# Patient Record
Sex: FEMALE | Race: WHITE | NOT HISPANIC OR LATINO | Employment: OTHER | URBAN - METROPOLITAN AREA
[De-identification: names, ages, dates, MRNs, and addresses within clinical notes are randomized per-mention and may not be internally consistent; named-entity substitution may affect disease eponyms.]

---

## 2017-03-20 ENCOUNTER — PATIENT MESSAGE (OUTPATIENT)
Dept: MEDICAL GROUP | Age: 57
End: 2017-03-20

## 2017-03-23 ENCOUNTER — OFFICE VISIT (OUTPATIENT)
Dept: MEDICAL GROUP | Age: 57
End: 2017-03-23
Payer: COMMERCIAL

## 2017-03-23 VITALS
DIASTOLIC BLOOD PRESSURE: 74 MMHG | SYSTOLIC BLOOD PRESSURE: 120 MMHG | HEART RATE: 78 BPM | OXYGEN SATURATION: 98 % | TEMPERATURE: 98.2 F | WEIGHT: 139 LBS | BODY MASS INDEX: 23.73 KG/M2 | HEIGHT: 64 IN

## 2017-03-23 DIAGNOSIS — H65.02 ACUTE SEROUS OTITIS MEDIA OF LEFT EAR, RECURRENCE NOT SPECIFIED: ICD-10-CM

## 2017-03-23 DIAGNOSIS — M62.838 MUSCLE SPASMS OF NECK: ICD-10-CM

## 2017-03-23 PROCEDURE — 99214 OFFICE O/P EST MOD 30 MIN: CPT | Performed by: INTERNAL MEDICINE

## 2017-03-23 RX ORDER — AMOXICILLIN AND CLAVULANATE POTASSIUM 875; 125 MG/1; MG/1
1 TABLET, FILM COATED ORAL 2 TIMES DAILY
Qty: 20 TAB | Refills: 0 | Status: SHIPPED | OUTPATIENT
Start: 2017-03-23 | End: 2017-04-02

## 2017-03-23 RX ORDER — OMEPRAZOLE 40 MG/1
40 CAPSULE, DELAYED RELEASE ORAL EVERY EVENING
Refills: 5 | COMMUNITY
Start: 2017-03-10 | End: 2020-01-30

## 2017-03-23 RX ORDER — IBUPROFEN 200 MG
200 TABLET ORAL EVERY 6 HOURS PRN
COMMUNITY
End: 2018-01-25

## 2017-03-23 RX ORDER — CYCLOBENZAPRINE HCL 10 MG
10 TABLET ORAL 2 TIMES DAILY PRN
Qty: 60 TAB | Refills: 3 | Status: SHIPPED | OUTPATIENT
Start: 2017-03-23 | End: 2017-09-19

## 2017-03-23 RX ORDER — METHYLPREDNISOLONE 4 MG/1
TABLET ORAL
Qty: 21 TAB | Refills: 0 | Status: SHIPPED | OUTPATIENT
Start: 2017-03-23 | End: 2017-09-19

## 2017-03-23 ASSESSMENT — PATIENT HEALTH QUESTIONNAIRE - PHQ9: CLINICAL INTERPRETATION OF PHQ2 SCORE: 0

## 2017-03-23 NOTE — MR AVS SNAPSHOT
"        Danii Rodriguez   3/23/2017 4:40 PM   Office Visit   MRN: [id number]    Department:  [address] [place] [address]   Dept Phone:  [phone]    Description:  Female : 1960   Provider:  Juany Ralph M.D.           Reason for Visit     Ear Pain with severe headaches and (L) cheek numbness mostly at night while laying down x 2-3 months      Allergies as of 3/23/2017     Allergen Noted Reactions    Ciprofloxacin 2016   Hives    Okay to take cipro ear drops, no reaction with ear drop    Nsaids 2016       Pt has stomach issues when she takes anti-inflammatories.    Other Environmental 2014       seasonal    Vicodin [Hydrocodone-Acetaminophen] 2016   Rash    Rash       You were diagnosed with     Acute serous otitis media of left ear, recurrence not specified   [[id number]]       Muscle spasms of neck   [190897]         Vital Signs     Blood Pressure Pulse Temperature Height Weight Body Mass Index    120/74 mmHg 78 36.8 °C (98.2 °F) 1.626 m (5' 4.02\") 63.05 kg (139 lb) 23.85 kg/m2    Oxygen Saturation Smoking Status                98% Former Smoker          Basic Information     Date Of Birth Sex Race Ethnicity Preferred Language    1960 Female White Non- English      Problem List              ICD-10-CM Priority Class Noted - Resolved    Seasonal allergies J30.2   2014 - Present    Gastroesophageal reflux disease without esophagitis K21.9   2014 - Present    History of carpal tunnel repair Z98.890   2014 - Present    Pressure sensation in left ear, left temporal area and left TM joint H93.8X2   2016 - Present    History of hypothyroidism Z86.39   2016 - Present    Hyperlipidemia E78.5   2016 - Present    Left elbow pain M25.522   2016 - Present    Ulcer of esophagus without bleeding K22.10   2016 - Present    Temporal pain R51   10/17/2016 - Present    Acute serous otitis media of left ear H65.02   3/23/2017 - Present    Muscle spasms " of neck M62.838   3/23/2017 - Present      Health Maintenance        Date Due Completion Dates    IMM INFLUENZA (1) 9/1/2016 10/27/2015    MAMMOGRAM 9/20/2017 9/20/2016, 10/7/2014, 7/22/2013, 10/30/2006, 1/25/2006, 1/24/2005, 1/23/2004    PAP SMEAR 10/22/2017 10/22/2014    COLONOSCOPY 10/16/2019 10/16/2014    IMM DTaP/Tdap/Td Vaccine (2 - Td) 9/8/2020 9/8/2010            Current Immunizations     Influenza Vaccine Quad Inj (Pf) 10/27/2015  8:09 AM    Tdap Vaccine 9/8/2010      Below and/or attached are the medications your provider expects you to take. Review all of your home medications and newly ordered medications with your provider and/or pharmacist. Follow medication instructions as directed by your provider and/or pharmacist. Please keep your medication list with you and share with your provider. Update the information when medications are discontinued, doses are changed, or new medications (including over-the-counter products) are added; and carry medication information at all times in the event of emergency situations     Allergies:  CIPROFLOXACIN - Hives     NSAIDS - (reactions not documented)     OTHER ENVIRONMENTAL - (reactions not documented)     VICODIN - Rash               Medications  Valid as of: March 23, 2017 -  5:46 PM    Generic Name Brand Name Tablet Size Instructions for use    Amoxicillin-Pot Clavulanate (Tab) AUGMENTIN 875-125 MG Take 1 Tab by mouth 2 times a day for 10 days.        Cholecalciferol (Cap) Vitamin D3 2000 UNIT Take  by mouth.        Cyclobenzaprine HCl (Tab) FLEXERIL 10 MG Take 1 Tab by mouth 2 times a day as needed.        Fluticasone Propionate (Suspension) FLONASE 50 MCG/ACT SPRAY 2 SPRAYS IN NOSE EVERY DAY.        Ibuprofen (Tab) MOTRIN 200 MG Take 200 mg by mouth every 6 hours as needed.        Krill Oil   Take  by mouth.        Loratadine (Tab) CLARITIN 10 MG Take 10 mg by mouth every day.        MethylPREDNISolone (Tablet Therapy Pack) MEDROL DOSEPAK 4 MG Take as  instructed and take with food.        Multiple Vitamins-Minerals   Take  by mouth.        Omeprazole (CAPSULE DELAYED RELEASE) PRILOSEC 40 MG Take 40 mg by mouth.        Probiotic Product   Take  by mouth.        .                 Medicines prescribed today were sent to:     [id number] - [place], [place] - [address]    [address] [facility] [address] [address]    Phone: [phone] Fax: [phone]    Open 24 Hours?: No    [id number] - [place], [place] - [address]    [address] [facility] [address]    Phone: [phone] Fax: [phone]    Open 24 Hours?: No      Medication refill instructions:       If your prescription bottle indicates you have medication refills left, it is not necessary to call your provider’s office. Please contact your pharmacy and they will refill your medication.    If your prescription bottle indicates you do not have any refills left, you may request refills at any time through one of the following ways: The online Makeblock system (except Urgent Care), by calling your provider’s office, or by asking your pharmacy to contact your provider’s office with a refill request. Medication refills are processed only during regular business hours and may not be available until the next business day. Your provider may request additional information or to have a follow-up visit with you prior to refilling your medication.   *Please Note: Medication refills are assigned a new Rx number when refilled electronically. Your pharmacy may indicate that no refills were authorized even though a new prescription for the same medication is available at the pharmacy. Please request the medicine by name with the pharmacy before contacting your provider for a refill.           Makeblock Access Code: Activation code not generated  Current Makeblock Status: Active

## 2017-03-24 NOTE — ASSESSMENT & PLAN NOTE
Patient reported that she has pressure, aching pain inside left ear radiated to left jaw and left side of head. Pain is constant, aching, pressure, pain increased at nighttime when she lied on the left side. She has upper airway infection 3 weeks ago and she still has runny nose and nasal congestion. She used sinus rinses and Flonase nasal spray. She also takes over-the-counter oral nasal decongestant. Her symptom does not improve with over-the-counter treatment. She has history of left-sided ear infection and has tube placed in the left side of the ear 6 years ago. She does not has recurrent ear infections since time. However, she has mild constant pressure pain on left side of the ear and temporal region as well as left TMJ. She does not think that she is clenching her teeth. However, she did not know that she clenches her jaw at night.  She had similar pain on the left side of the temporal area in October 2016 and ESR was negative at the time and rule out giant cell arteritis.

## 2017-03-24 NOTE — ASSESSMENT & PLAN NOTE
Patient reported that she has spasm on the back of the neck bilaterally for 2-3 months. She takes over-the-counter Advil with slightly improved. She feels her tightness radiated to the back of the neck as well. Symptoms is worsening at night.

## 2017-03-24 NOTE — PROGRESS NOTES
Subjective:   Danii Rodriguez is a 56 y.o. female here today for evaluation and management of:      Acute serous otitis media of left ear  Patient reported that she has pressure, aching pain inside left ear radiated to left jaw and left side of head. Pain is constant, aching, pressure, pain increased at nighttime when she lied on the left side. She has upper airway infection 3 weeks ago and she still has runny nose and nasal congestion. She used sinus rinses and Flonase nasal spray. She also takes over-the-counter oral nasal decongestant. Her symptom does not improve with over-the-counter treatment. She has history of left-sided ear infection and has tube placed in the left side of the ear 6 years ago. She does not has recurrent ear infections since time. However, she has mild constant pressure pain on left side of the ear and temporal region as well as left TMJ. She does not think that she is clenching her teeth. However, she did not know that she clenches her jaw at night.  She had similar pain on the left side of the temporal area in October 2016 and ESR was negative at the time and rule out giant cell arteritis.    Muscle spasms of neck  Patient reported that she has spasm on the back of the neck bilaterally for 2-3 months. She takes over-the-counter Advil with slightly improved. She feels her tightness radiated to the back of the neck as well. Symptoms is worsening at night.         Current medicines (including changes today)  Current Outpatient Prescriptions   Medication Sig Dispense Refill   • ibuprofen (ADVIL) 200 MG Tab Take 200 mg by mouth every 6 hours as needed.     • omeprazole (PRILOSEC) 40 MG delayed-release capsule Take 40 mg by mouth.  5   • cyclobenzaprine (FLEXERIL) 10 MG Tab Take 1 Tab by mouth 2 times a day as needed. 60 Tab 3   • amoxicillin-clavulanate (AUGMENTIN) 875-125 MG Tab Take 1 Tab by mouth 2 times a day for 10 days. 20 Tab 0   • MethylPREDNISolone (MEDROL DOSEPAK) 4 MG Tablet  "Therapy Pack Take as instructed and take with food. 21 Tab 0   • fluticasone (FLONASE) 50 MCG/ACT nasal spray SPRAY 2 SPRAYS IN NOSE EVERY DAY. 16 g 3   • Probiotic Product (PROBIOTIC COLON SUPPORT PO) Take  by mouth.     • Multiple Vitamins-Minerals (MULTIVITAL PO) Take  by mouth.     • Cholecalciferol (VITAMIN D3) 2000 UNIT Cap Take  by mouth.     • KRILL OIL OMEGA-3 PO Take  by mouth.     • loratadine (CLARITIN) 10 MG TABS Take 10 mg by mouth every day.       No current facility-administered medications for this visit.     She  has a past medical history of Unspecified urinary incontinence; MRSA (methicillin resistant staph aureus) culture positive (2010); Indigestion; Unspecified disorder of thyroid; and Anesthesia.    ROS   No chest pain, no shortness of breath, no abdominal pain       Objective:     Blood pressure 120/74, pulse 78, temperature 36.8 °C (98.2 °F), height 1.626 m (5' 4.02\"), weight 63.05 kg (139 lb), SpO2 98 %. Body mass index is 23.85 kg/(m^2).   Physical Exam:  General: Alert, oriented and no acute distress.  Eye contact is good, speech goal directed, affect calm  HEENT: conjunctiva non-injected, sclera non-icteric.  Oral mucous membranes pink and moist with no lesions.  Pinna normal. TM pearly gray. Erythema and swelling on left middle ear with serous effusion.    Neck No supraclavicular, submandibular, submental lymphadenopathy or masses in the neck or supraclavicular regions.  Lungs: Normal respiratory effort, clear to auscultation bilaterally with good excursion.  CV: regular rate and rhythm. No murmurs. No carotid bruits.  Abdomen: soft, non distended, nontender, Bowel sound normal.  Ext: no edema, color normal, vascularity normal, temperature normal  Musculoskeletal exam: mild tightness and tenderness on bilateral trapezius and bilateral neck. ROM of neck within normal.       Assessment and Plan:   The following treatment plan was discussed     1. Acute serous otitis media of left ear, " recurrence not specified  - Prescribed Augmentin and advise to take it with Probiotic. Reviewed the side effects of augmentin and Medrol rene with patient.  - Discussed at length to rinse sinuses with over the counter nasal wash or Netti pot once or twice a day and then use flonase nasal spray once or twice a day after rinsing flonase nasal spray.  - Advised to try nasal steam therapy. Showed picture and discussed with patient how to do nasal steam therapy.   - Given acute nasal congestion, pain and inflammation of left ear, I will prescribe Medrol dosepak. Advised to take it as instructed and take it with food.  - Advised to avoid nasal decongestant.    - Advised to try room humidifier.   - amoxicillin-clavulanate (AUGMENTIN) 875-125 MG Tab; Take 1 Tab by mouth 2 times a day for 10 days.  Dispense: 20 Tab; Refill: 0  - MethylPREDNISolone (MEDROL DOSEPAK) 4 MG Tablet Therapy Pack; Take as instructed and take with food.  Dispense: 21 Tab; Refill: 0    2. Muscle spasms of neck  - Discussed to try Flexeril 0.5-1 tab at bedtime as needed for muscle spasm. Discussed to try heat therapy on her neck and shoulder.   - Advised to do gentle stretching exercise regularly.   - cyclobenzaprine (FLEXERIL) 10 MG Tab; Take 1 Tab by mouth 2 times a day as needed.  Dispense: 60 Tab; Refill: 3      Face-to-face time spent 30 minutes with patient and more than half of that time spent for counseling and cooperating of care for medical problems listed above.       Followup: Return if symptoms worsen or fail to improve.      Please note that this dictation was created using voice recognition software. I have made every reasonable attempt to correct obvious errors, but I expect that there may have unintended errors in text, spelling, punctuation, or grammar that I did not discover.

## 2017-07-05 RX ORDER — FLUTICASONE PROPIONATE 50 MCG
SPRAY, SUSPENSION (ML) NASAL
Qty: 1 BOTTLE | Refills: 3 | Status: SHIPPED | OUTPATIENT
Start: 2017-07-05 | End: 2017-10-05

## 2017-09-18 ENCOUNTER — TELEPHONE (OUTPATIENT)
Dept: MEDICAL GROUP | Age: 57
End: 2017-09-18

## 2017-09-18 NOTE — TELEPHONE ENCOUNTER
ESTABLISHED PATIENT PRE-VISIT PLANNING     Note: Patient will not be contacted if there is no indication to call.     1.  Reviewed notes from the last few office visits within the medical group: Yes    2.  If any orders were placed at last visit or intended to be done for this visit (i.e. 6 mos follow-up), do we have Results/Consult Notes?        •  Labs - Labs were not ordered at last office visit.       •  Imaging - Imaging was not ordered at last office visit.       •  Referrals - No referrals were ordered at last office visit.    3. Is this appointment scheduled as a Hospital Follow-Up? No    4.  Immunizations were updated in [facility] using WebIZ?: Yes       •  Web Iz Recommendations: FLU    5.  Patient is due for the following Health Maintenance Topics:   Health Maintenance Due   Topic Date Due   • IMM INFLUENZA (1) 09/01/2017       - Patient has completed TDAP Immunization(s) per WebIZ. Chart has been updated.      6.  Patient was NOT informed to arrive 15 min prior to their scheduled appointment and bring in their medication bottles.

## 2017-09-19 ENCOUNTER — OFFICE VISIT (OUTPATIENT)
Dept: MEDICAL GROUP | Age: 57
End: 2017-09-19
Payer: COMMERCIAL

## 2017-09-19 VITALS
RESPIRATION RATE: 16 BRPM | BODY MASS INDEX: 25.34 KG/M2 | HEART RATE: 74 BPM | DIASTOLIC BLOOD PRESSURE: 76 MMHG | OXYGEN SATURATION: 97 % | WEIGHT: 143 LBS | SYSTOLIC BLOOD PRESSURE: 154 MMHG | TEMPERATURE: 98.4 F | HEIGHT: 63 IN

## 2017-09-19 DIAGNOSIS — R53.82 CHRONIC FATIGUE: ICD-10-CM

## 2017-09-19 DIAGNOSIS — I10 ESSENTIAL HYPERTENSION: ICD-10-CM

## 2017-09-19 DIAGNOSIS — E78.00 PURE HYPERCHOLESTEROLEMIA: ICD-10-CM

## 2017-09-19 DIAGNOSIS — R25.2 MUSCLE CRAMP: ICD-10-CM

## 2017-09-19 PROCEDURE — 99214 OFFICE O/P EST MOD 30 MIN: CPT | Performed by: INTERNAL MEDICINE

## 2017-09-19 RX ORDER — METOPROLOL SUCCINATE 25 MG/1
25 TABLET, EXTENDED RELEASE ORAL DAILY
Qty: 30 TAB | Refills: 3 | Status: SHIPPED | OUTPATIENT
Start: 2017-09-19 | End: 2018-01-09 | Stop reason: SDUPTHER

## 2017-09-19 RX ORDER — BACLOFEN 10 MG/1
5-10 TABLET ORAL 2 TIMES DAILY PRN
Qty: 30 TAB | Refills: 0 | Status: SHIPPED | OUTPATIENT
Start: 2017-09-19 | End: 2018-01-25

## 2017-09-19 ASSESSMENT — PAIN SCALES - GENERAL: PAINLEVEL: 10=SEVERE PAIN

## 2017-09-19 NOTE — ASSESSMENT & PLAN NOTE
Patient reported that she is feeling tired and fatigued for 6 months. She used to be very active and right now she could not do a lot of physical exercise. Patient stated that her diet has not been changed and her appetite is stable. She was treated with levothyroxine a few years ago due to borderline hypothyroid. She did not feel any different when she was taking levothyroxine, so she stopped taking it.

## 2017-09-19 NOTE — ASSESSMENT & PLAN NOTE
Patient does not take medication for her cholesterol, coronary. She tries to control her cholesterol with diet and exercise. She used to take krill oil which she stopped taking it currently.

## 2017-09-19 NOTE — ASSESSMENT & PLAN NOTE
Patient reported that she has severe muscle cramping on both lower extremities off and on for a few months. She noticed worsening symptoms in past few nights. She tried over-the-counter potassium and felt that symptoms slightly improved with taking potassium. She does not notice any triggering factors.

## 2017-09-19 NOTE — PROGRESS NOTES
Subjective:   Danii Rodriguez is a 56 y.o. female here today for evaluation and management of:      Pure hypercholesterolemia  Patient does not take medication for her cholesterol, coronary. She tries to control her cholesterol with diet and exercise. She used to take krill oil which she stopped taking it currently.    Muscle cramp  Patient reported that she has severe muscle cramping on both lower extremities off and on for a few months. She noticed worsening symptoms in past few nights. She tried over-the-counter potassium and felt that symptoms slightly improved with taking potassium. She does not notice any triggering factors.     Essential hypertension  Patient has high blood pressure in the clinic and she also reported having dizzy and headache with high blood pressure. She also reported palpitations at night. We discussed to try metoprolol SR 25 mg daily. Patient is advised to check her blood pressure and pulse every day and return back to clinic in 4 weeks follow-up.    Chronic fatigue  Patient reported that she is feeling tired and fatigued for 6 months. She used to be very active and right now she could not do a lot of physical exercise. Patient stated that her diet has not been changed and her appetite is stable. She was treated with levothyroxine a few years ago due to borderline hypothyroid. She did not feel any different when she was taking levothyroxine, so she stopped taking it.          Current medicines (including changes today)  Current Outpatient Prescriptions   Medication Sig Dispense Refill   • baclofen (LIORESAL) 10 MG Tab Take 0.5-1 Tabs by mouth 2 times a day as needed. 30 Tab 0   • metoprolol SR (TOPROL XL) 25 MG TABLET SR 24 HR Take 1 Tab by mouth every day. 30 Tab 3   • fluticasone (FLONASE) 50 MCG/ACT nasal spray SPRAY 2 SPRAYS IN NOSE EVERY DAY. 1 Bottle 3   • ibuprofen (ADVIL) 200 MG Tab Take 200 mg by mouth every 6 hours as needed.     • omeprazole (PRILOSEC) 40 MG delayed-release  "capsule Take 40 mg by mouth.  5   • Probiotic Product (PROBIOTIC COLON SUPPORT PO) Take  by mouth.     • Multiple Vitamins-Minerals (MULTIVITAL PO) Take  by mouth.     • Cholecalciferol (VITAMIN D3) 2000 UNIT Cap Take  by mouth.     • loratadine (CLARITIN) 10 MG TABS Take 10 mg by mouth every day.       No current facility-administered medications for this visit.      She  has a past medical history of Anesthesia; Indigestion; MRSA (methicillin resistant staph aureus) culture positive (2010); Unspecified disorder of thyroid; and Unspecified urinary incontinence.    ROS   No chest pain, no shortness of breath, no abdominal pain       Objective:     Blood pressure 154/76, pulse 74, temperature 36.9 °C (98.4 °F), resp. rate 16, height 1.6 m (5' 3\"), weight 64.9 kg (143 lb), SpO2 97 %. Body mass index is 25.33 kg/m².   Physical Exam:  General: Alert, oriented and no acute distress.  Eye contact is good, speech goal directed, affect calm  HEENT: conjunctiva non-injected, sclera non-icteric.  Oral mucous membranes pink and moist with no lesions.  Pinna normal.   Lungs: Normal respiratory effort, clear to auscultation bilaterally with good excursion.  CV: regular rate and rhythm. No murmurs.   Abdomen: soft, non distended, nontender,  Bowel sound normal.  Ext: no edema, color normal, vascularity normal, temperature normal      Assessment and Plan:   The following treatment plan was discussed     1. Chronic fatigue  - We will test for CBC, CMP, thyroid, vitamin D, magnesium for further workup. Counseling for healthy diet and regular physical exercise.  - CBC WITH DIFFERENTIAL; Future  - TSH; Future  - FREE THYROXINE; Future  - VITAMIN D,25 HYDROXY; Future  - MAGNESIUM    2. Pure hypercholesterolemia  - Patient tries to control cholesterol with diet. Advised to take omega-3 1200 mg twice a day.   - Advised to eat low fat, low carbohydrate and high fiber diet as well as do cardio physical exercise regularly.   - Recheck lab " for dysuria.  - COMP METABOLIC PANEL; Future  - LIPID PROFILE; Future    3. Muscle cramp  - We discussed to drink plenty of fluid and water, try magnesium and potassium once a day. We discussed to have stretching exercises on the back and lower extremities, heat therapy, foot bath.  - She may try baclofen 5 mg to 10 mg twice a day when necessary for muscle cramp. Reviewed the potential side effect of baclofen's with patient.  - baclofen (LIORESAL) 10 MG Tab; Take 0.5-1 Tabs by mouth 2 times a day as needed.  Dispense: 30 Tab; Refill: 0  - CBC WITH DIFFERENTIAL; Future  - COMP METABOLIC PANEL; Future  - MAGNESIUM    4. Essential hypertension  - Prescribed metoprolol XL 25 mg daily. I reviewed the side effects of metoprolol with patient.  - Recommend to monitor blood pressure and heart rate at home.  - She will return to clinic in 4 weeks for follow-up.  - metoprolol SR (TOPROL XL) 25 MG TABLET SR 24 HR; Take 1 Tab by mouth every day.  Dispense: 30 Tab; Refill: 3        Followup: Return in about 4 weeks (around 10/17/2017), or if symptoms worsen or fail to improve, for hypertension, hyperlipidemia, fatigue, muscle cramping, and lab review.      Please note that this dictation was created using voice recognition software. I have made every reasonable attempt to correct obvious errors, but I expect that there may have unintended errors in text, spelling, punctuation, or grammar that I did not discover.

## 2017-09-19 NOTE — ASSESSMENT & PLAN NOTE
Patient has high blood pressure in the clinic and she also reported having dizzy and headache with high blood pressure. She also reported palpitations at night. We discussed to try metoprolol SR 25 mg daily. Patient is advised to check her blood pressure and pulse every day and return back to clinic in 4 weeks follow-up.

## 2017-09-21 ENCOUNTER — TELEPHONE (OUTPATIENT)
Dept: MEDICAL GROUP | Age: 57
End: 2017-09-21

## 2017-09-21 NOTE — TELEPHONE ENCOUNTER
1. Caller Name: Danii Rodriguez                                           Call Back Number: [phone] (home)         Patient approves a detailed voicemail message: N\A    Pt came in for appt on 09/19/17, She is requesting a letter for the DMV concerning her driving with her BP  readings. She can come  at your nearest convenience but she did say it is time sensative. This was a VM that was left, I tried to call back and I left message due to no answer.

## 2017-09-21 NOTE — TELEPHONE ENCOUNTER
Please inform patient to  the letter at [address] [facility] office.    Thanks!  Juany Ralph M.D.

## 2017-09-21 NOTE — LETTER
September 21, 2017    Re: Danii Rodriguez,     To whom it may concern,      Ms. Danii Rodriguez was seen in clinic on 9/19/17. She is first time diagnosed with hypertension. I prescribed her medication to treat her high blood pressure.    She is able to drive if her blood pressure is less than 130/80.           Regards,              Juany Ralph M.D.

## 2017-09-22 ENCOUNTER — PATIENT MESSAGE (OUTPATIENT)
Dept: MEDICAL GROUP | Age: 57
End: 2017-09-22

## 2017-09-22 NOTE — TELEPHONE ENCOUNTER
Phone Number Called: [phone] (home)     Message: called pt notified of message below.     Left Message for patient to call back: no

## 2017-09-22 NOTE — TELEPHONE ENCOUNTER
Juany Ralph M.D.          12:52 PM   Note      Please inform patient to  the letter at [address] [place] [address].     Thanks!  Juany Ralph M.D.

## 2017-10-04 DIAGNOSIS — J30.2 SEASONAL ALLERGIC RHINITIS: ICD-10-CM

## 2017-10-05 RX ORDER — FLUTICASONE PROPIONATE 50 MCG
SPRAY, SUSPENSION (ML) NASAL
Qty: 1 BOTTLE | Refills: 6 | Status: SHIPPED | OUTPATIENT
Start: 2017-10-05 | End: 2018-10-05 | Stop reason: SDUPTHER

## 2017-10-19 ENCOUNTER — HOSPITAL ENCOUNTER (OUTPATIENT)
Dept: LAB | Facility: MEDICAL CENTER | Age: 57
End: 2017-10-19
Attending: INTERNAL MEDICINE
Payer: COMMERCIAL

## 2017-10-19 DIAGNOSIS — R53.82 CHRONIC FATIGUE: ICD-10-CM

## 2017-10-19 DIAGNOSIS — E78.00 PURE HYPERCHOLESTEROLEMIA: ICD-10-CM

## 2017-10-19 DIAGNOSIS — R25.2 MUSCLE CRAMP: ICD-10-CM

## 2017-10-19 LAB
25(OH)D3 SERPL-MCNC: 27 NG/ML (ref 30–100)
ALBUMIN SERPL BCP-MCNC: 4.7 G/DL (ref 3.2–4.9)
ALBUMIN/GLOB SERPL: 1.8 G/DL
ALP SERPL-CCNC: 65 U/L (ref 30–99)
ALT SERPL-CCNC: 18 U/L (ref 2–50)
ANION GAP SERPL CALC-SCNC: 5 MMOL/L (ref 0–11.9)
AST SERPL-CCNC: 20 U/L (ref 12–45)
BASOPHILS # BLD AUTO: 0.6 % (ref 0–1.8)
BASOPHILS # BLD: 0.03 K/UL (ref 0–0.12)
BILIRUB SERPL-MCNC: 0.5 MG/DL (ref 0.1–1.5)
BUN SERPL-MCNC: 13 MG/DL (ref 8–22)
CALCIUM SERPL-MCNC: 9.8 MG/DL (ref 8.5–10.5)
CHLORIDE SERPL-SCNC: 106 MMOL/L (ref 96–112)
CHOLEST SERPL-MCNC: 277 MG/DL (ref 100–199)
CO2 SERPL-SCNC: 27 MMOL/L (ref 20–33)
CREAT SERPL-MCNC: 0.7 MG/DL (ref 0.5–1.4)
EOSINOPHIL # BLD AUTO: 0.07 K/UL (ref 0–0.51)
EOSINOPHIL NFR BLD: 1.4 % (ref 0–6.9)
ERYTHROCYTE [DISTWIDTH] IN BLOOD BY AUTOMATED COUNT: 43.8 FL (ref 35.9–50)
GFR SERPL CREATININE-BSD FRML MDRD: >60 ML/MIN/1.73 M 2
GLOBULIN SER CALC-MCNC: 2.6 G/DL (ref 1.9–3.5)
GLUCOSE SERPL-MCNC: 85 MG/DL (ref 65–99)
HCT VFR BLD AUTO: 42.3 % (ref 37–47)
HDLC SERPL-MCNC: 67 MG/DL
HGB BLD-MCNC: 13.9 G/DL (ref 12–16)
IMM GRANULOCYTES # BLD AUTO: 0.01 K/UL (ref 0–0.11)
IMM GRANULOCYTES NFR BLD AUTO: 0.2 % (ref 0–0.9)
LDLC SERPL CALC-MCNC: 181 MG/DL
LYMPHOCYTES # BLD AUTO: 1.46 K/UL (ref 1–4.8)
LYMPHOCYTES NFR BLD: 29.4 % (ref 22–41)
MCH RBC QN AUTO: 30.6 PG (ref 27–33)
MCHC RBC AUTO-ENTMCNC: 32.9 G/DL (ref 33.6–35)
MCV RBC AUTO: 93.2 FL (ref 81.4–97.8)
MONOCYTES # BLD AUTO: 0.37 K/UL (ref 0–0.85)
MONOCYTES NFR BLD AUTO: 7.4 % (ref 0–13.4)
NEUTROPHILS # BLD AUTO: 3.03 K/UL (ref 2–7.15)
NEUTROPHILS NFR BLD: 61 % (ref 44–72)
NRBC # BLD AUTO: 0 K/UL
NRBC BLD AUTO-RTO: 0 /100 WBC
PLATELET # BLD AUTO: 262 K/UL (ref 164–446)
PMV BLD AUTO: 11.6 FL (ref 9–12.9)
POTASSIUM SERPL-SCNC: 4.1 MMOL/L (ref 3.6–5.5)
PROT SERPL-MCNC: 7.3 G/DL (ref 6–8.2)
RBC # BLD AUTO: 4.54 M/UL (ref 4.2–5.4)
SODIUM SERPL-SCNC: 138 MMOL/L (ref 135–145)
T4 FREE SERPL-MCNC: 0.85 NG/DL (ref 0.53–1.43)
TRIGL SERPL-MCNC: 146 MG/DL (ref 0–149)
TSH SERPL DL<=0.005 MIU/L-ACNC: 1.96 UIU/ML (ref 0.3–3.7)
WBC # BLD AUTO: 5 K/UL (ref 4.8–10.8)

## 2017-10-19 PROCEDURE — 80061 LIPID PANEL: CPT

## 2017-10-19 PROCEDURE — 84439 ASSAY OF FREE THYROXINE: CPT

## 2017-10-19 PROCEDURE — 85025 COMPLETE CBC W/AUTO DIFF WBC: CPT

## 2017-10-19 PROCEDURE — 36415 COLL VENOUS BLD VENIPUNCTURE: CPT

## 2017-10-19 PROCEDURE — 82306 VITAMIN D 25 HYDROXY: CPT

## 2017-10-19 PROCEDURE — 84443 ASSAY THYROID STIM HORMONE: CPT

## 2017-10-19 PROCEDURE — 80053 COMPREHEN METABOLIC PANEL: CPT

## 2017-10-24 ENCOUNTER — TELEPHONE (OUTPATIENT)
Dept: MEDICAL GROUP | Age: 57
End: 2017-10-24

## 2017-10-24 NOTE — TELEPHONE ENCOUNTER
ESTABLISHED PATIENT PRE-VISIT PLANNING     Note: Patient will not be contacted if there is no indication to call.     1.  Reviewed notes from the last few office visits within the medical group: Yes    2.  If any orders were placed at last visit or intended to be done for this visit (i.e. 6 mos follow-up), do we have Results/Consult Notes?        •  Labs - Labs ordered, completed on 10/24 and results are in chart.   Note: If patient appointment is for lab review and patient did not complete labs,                check with provider if OK to reschedule patient until labs completed.       •  Imaging - Imaging was not ordered at last office visit.       •  Referrals - No referrals were ordered at last office visit.    3. Is this appointment scheduled as a Hospital Follow-Up? No    4.  Immunizations were updated in Epic using WebIZ?: Epic matches WebIZ       •  Web Iz Recommendations: FLU, MMR , TD and VARICELLA (Chicken Pox)     5.  Patient is due for the following Health Maintenance Topics:   Health Maintenance Due   Topic Date Due   • IMM INFLUENZA (1) 09/01/2017   • MAMMOGRAM  09/20/2017   • PAP SMEAR  10/22/2017           6.  Patient was NOT informed to arrive 15 min prior to their scheduled appointment and bring in their medication bottles.

## 2017-10-25 ENCOUNTER — OFFICE VISIT (OUTPATIENT)
Dept: MEDICAL GROUP | Age: 57
End: 2017-10-25
Payer: COMMERCIAL

## 2017-10-25 VITALS
OXYGEN SATURATION: 98 % | HEIGHT: 63 IN | TEMPERATURE: 97.5 F | SYSTOLIC BLOOD PRESSURE: 116 MMHG | BODY MASS INDEX: 25.55 KG/M2 | DIASTOLIC BLOOD PRESSURE: 60 MMHG | RESPIRATION RATE: 16 BRPM | WEIGHT: 144.2 LBS | HEART RATE: 74 BPM

## 2017-10-25 DIAGNOSIS — R53.82 CHRONIC FATIGUE: ICD-10-CM

## 2017-10-25 DIAGNOSIS — R25.2 MUSCLE CRAMP: ICD-10-CM

## 2017-10-25 DIAGNOSIS — E55.9 VITAMIN D INSUFFICIENCY: ICD-10-CM

## 2017-10-25 DIAGNOSIS — I10 ESSENTIAL HYPERTENSION: ICD-10-CM

## 2017-10-25 DIAGNOSIS — Z12.31 VISIT FOR SCREENING MAMMOGRAM: ICD-10-CM

## 2017-10-25 DIAGNOSIS — Z23 NEEDS FLU SHOT: ICD-10-CM

## 2017-10-25 DIAGNOSIS — E78.00 PURE HYPERCHOLESTEROLEMIA: ICD-10-CM

## 2017-10-25 PROBLEM — H65.02 ACUTE SEROUS OTITIS MEDIA OF LEFT EAR: Status: RESOLVED | Noted: 2017-03-23 | Resolved: 2017-10-25

## 2017-10-25 PROCEDURE — 90686 IIV4 VACC NO PRSV 0.5 ML IM: CPT | Performed by: INTERNAL MEDICINE

## 2017-10-25 PROCEDURE — 99214 OFFICE O/P EST MOD 30 MIN: CPT | Mod: 25 | Performed by: INTERNAL MEDICINE

## 2017-10-25 PROCEDURE — 90471 IMMUNIZATION ADMIN: CPT | Performed by: INTERNAL MEDICINE

## 2017-10-25 RX ORDER — ATORVASTATIN CALCIUM 10 MG/1
10 TABLET, FILM COATED ORAL EVERY EVENING
Qty: 30 TAB | Refills: 11 | Status: SHIPPED | OUTPATIENT
Start: 2017-10-25 | End: 2018-01-25 | Stop reason: SDUPTHER

## 2017-10-25 RX ORDER — UBIDECARENONE 30 MG
100 CAPSULE ORAL DAILY
COMMUNITY
End: 2018-06-05

## 2017-10-25 NOTE — ASSESSMENT & PLAN NOTE
Patient reported that her fatigue and tiredness improved after blood pressure is better controlled. She is able to started exercising regularly. Her thyroid function test was normal. She has low vitamin D of 27 on 10/19/17.

## 2017-10-25 NOTE — PROGRESS NOTES
Subjective:   Danii Rodriguez is a 57 y.o. female here today for evaluation and management of:      Pure hypercholesterolemia  Patient tries to control her cholesterol with diet and exercise. However, her cholesterol is not well controlled. I review her recent blood tests with her today. Patient agreed to start statin treatment.    Results for DANII RODRIGUEZ (MRN [id number]) as of 10/25/2017 14:31   Ref. Range 5/13/2016 08:24 8/18/2016 07:52 10/19/2017 08:10   Cholesterol,Tot Latest Ref Range: 100 - 199 mg/dL 244 (H) 227 (H) 277 (H)   Triglycerides Latest Ref Range: 0 - 149 mg/dL 76 92 146   HDL Latest Ref Range: >=40 mg/dL 69 69 67   LDL Latest Ref Range: <100 mg/dL 160 (H) 140 (H) 181 (H)       Muscle cramp  Patient reported that her muscle cramp on lower extremities improved from drinking enough water and taking magnesium and potassium supplement from over-the-counter as we discussed in last visit. Patient also feels more energy after starting blood pressure medication, metoprolol, and stated that she is able to do more exercise.    Essential hypertension  She started taking metoprolol XL 25 mg daily on 9/19/17 and she tolerates medication without side effect. Her blood pressure is improved with treatment. Her recent CBC and CMP were normal.    Chronic fatigue  Patient reported that her fatigue and tiredness improved after blood pressure is better controlled. She is able to started exercising regularly. Her thyroid function test was normal. She has low vitamin D of 27 on 10/19/17.    Vitamin D insufficiency  Patient is taking over-the-counter vitamin D 2000 units daily. However, her vitamin D is still low at 27 on 10/19/17. We discussed to increased her dose of vitamin D to 4000 units daily.         Current medicines (including changes today)  Current Outpatient Prescriptions   Medication Sig Dispense Refill   • TURMERIC PO Take  by mouth.     • coenzyme Q-10 100 MG Cap capsule Take 100 mg by mouth every  "day.     • atorvastatin (LIPITOR) 10 MG Tab Take 1 Tab by mouth every evening. 30 Tab 11   • fluticasone (FLONASE) 50 MCG/ACT nasal spray SPRAY 2 SPRAYS IN NOSE EVERY DAY. 1 Bottle 6   • baclofen (LIORESAL) 10 MG Tab Take 0.5-1 Tabs by mouth 2 times a day as needed. 30 Tab 0   • metoprolol SR (TOPROL XL) 25 MG TABLET SR 24 HR Take 1 Tab by mouth every day. 30 Tab 3   • ibuprofen (ADVIL) 200 MG Tab Take 200 mg by mouth every 6 hours as needed.     • omeprazole (PRILOSEC) 40 MG delayed-release capsule Take 40 mg by mouth.  5   • Probiotic Product (PROBIOTIC COLON SUPPORT PO) Take  by mouth.     • Multiple Vitamins-Minerals (MULTIVITAL PO) Take  by mouth.     • Cholecalciferol (VITAMIN D3) 2000 UNIT Cap Take 4,000 Units by mouth.     • loratadine (CLARITIN) 10 MG TABS Take 10 mg by mouth every day.       No current facility-administered medications for this visit.      She  has a past medical history of Anesthesia; Indigestion; MRSA (methicillin resistant staph aureus) culture positive (2010); Unspecified disorder of thyroid; and Unspecified urinary incontinence.    ROS   No chest pain, no shortness of breath, no abdominal pain       Objective:     Blood pressure 116/60, pulse 74, temperature 36.4 °C (97.5 °F), resp. rate 16, height 1.6 m (5' 2.99\"), weight 65.4 kg (144 lb 3.2 oz), SpO2 98 %. Body mass index is 25.55 kg/m².   Physical Exam:  General: Alert, oriented and no acute distress.  Eye contact is good, speech goal directed, affect calm  HEENT: conjunctiva non-injected, sclera non-icteric.  Oral mucous membranes pink and moist with no lesions.  Pinna normal.   Lungs: Normal respiratory effort, clear to auscultation bilaterally with good excursion.  CV: regular rate and rhythm. No murmurs.  Abdomen: soft, non distended, nontender, Bowel sound normal.  Ext: no edema, color normal, vascularity normal, temperature normal        Assessment and Plan:   The following treatment plan was discussed     1. Pure " hypercholesterolemia  - Not well-controlled with diet and exercise. Will start atorvastatin 5 mg daily for one week and she can increase to 10 mg daily if she tolerates. Advised to take atorvastatin every evening.  - Advised to eat low fat, low carbohydrate and high fiber diet as well as do cardio physical exercise regularly.   - atorvastatin (LIPITOR) 10 MG Tab; Take 1 Tab by mouth every evening.  Dispense: 30 Tab; Refill: 11  - COMP METABOLIC PANEL; Future  - LIPID PROFILE; Future    2. Muscle cramp  - Improved. Continue magnesium and potassium supplement from over-the-counter.  - Advised to hydrated well.    3. Essential hypertension  - Well-controlled. Continue current regimens, metoprolol XL 25 mg daily. Recheck lab 1-2 weeks before next follow up visit.  - Recommend to monitor blood pressure and heart rate at home.  - COMP METABOLIC PANEL; Future    4. Vitamin D insufficiency  - Not well-controlled. Increased vitamin D to 4000 units daily.  - VITAMIN D,25 HYDROXY; Future    5. Chronic fatigue  - Improved. Discussed healthy diet and regular physical exercise.   - Increased the dose of vitamin D.    6. Visit for screening mammogram  - Ordered screening mammogram.  - MA-SCREEN MAMMO W/CAD-BILAT; Future    7. Needs flu shot  - Influenza vaccine was given today after reviewing risks and benefits as well as side effects of vaccine.  - INFLUENZA VACCINE QUAD INJ >3Y(PF)    8. Health maintenance  - Patient is overdue for mammogram. Order mammogram for patient and advised her to call to schedule for mammogram. Patient will call her gynecologist for Pap smear. She received flu shot today.    Followup: Return in about 3 months (around 1/25/2018), or if symptoms worsen or fail to improve, for hypertension, hyperlipidemia, vitamin D insufficiency, leg cramping, fatigue, lab review.      Please note that this dictation was created using voice recognition software. I have made every reasonable attempt to correct obvious  errors, but I expect that there may have unintended errors in text, spelling, punctuation, or grammar that I did not discover.

## 2017-10-25 NOTE — ASSESSMENT & PLAN NOTE
She started taking metoprolol XL 25 mg daily on 9/19/17 and she tolerates medication without side effect. Her blood pressure is improved with treatment. Her recent CBC and CMP were normal.

## 2017-10-25 NOTE — ASSESSMENT & PLAN NOTE
Patient is taking over-the-counter vitamin D 2000 units daily. However, her vitamin D is still low at 27 on 10/19/17. We discussed to increased her dose of vitamin D to 4000 units daily.

## 2017-10-25 NOTE — ASSESSMENT & PLAN NOTE
Patient tries to control her cholesterol with diet and exercise. However, her cholesterol is not well controlled. I review her recent blood tests with her today. Patient agreed to start statin treatment.    Results for CLAY VALENTINE (MRN [id number]) as of 10/25/2017 14:31   Ref. Range 5/13/2016 08:24 8/18/2016 07:52 10/19/2017 08:10   Cholesterol,Tot Latest Ref Range: 100 - 199 mg/dL 244 (H) 227 (H) 277 (H)   Triglycerides Latest Ref Range: 0 - 149 mg/dL 76 92 146   HDL Latest Ref Range: >=40 mg/dL 69 69 67   LDL Latest Ref Range: <100 mg/dL 160 (H) 140 (H) 181 (H)

## 2017-10-25 NOTE — ASSESSMENT & PLAN NOTE
Patient reported that her muscle cramp on lower extremities improved from drinking enough water and taking magnesium and potassium supplement from over-the-counter as we discussed in last visit. Patient also feels more energy after starting blood pressure medication, metoprolol, and stated that she is able to do more exercise.

## 2017-12-14 ENCOUNTER — HOSPITAL ENCOUNTER (OUTPATIENT)
Dept: RADIOLOGY | Facility: MEDICAL CENTER | Age: 57
End: 2017-12-14
Attending: INTERNAL MEDICINE
Payer: COMMERCIAL

## 2017-12-14 DIAGNOSIS — Z12.31 VISIT FOR SCREENING MAMMOGRAM: ICD-10-CM

## 2017-12-14 PROCEDURE — G0202 SCR MAMMO BI INCL CAD: HCPCS

## 2018-01-09 DIAGNOSIS — I10 ESSENTIAL HYPERTENSION: ICD-10-CM

## 2018-01-09 RX ORDER — METOPROLOL SUCCINATE 25 MG/1
25 TABLET, EXTENDED RELEASE ORAL DAILY
Qty: 30 TAB | Refills: 6 | Status: SHIPPED | OUTPATIENT
Start: 2018-01-09 | End: 2018-06-05

## 2018-01-20 ENCOUNTER — HOSPITAL ENCOUNTER (OUTPATIENT)
Dept: LAB | Facility: MEDICAL CENTER | Age: 58
End: 2018-01-20
Attending: INTERNAL MEDICINE
Payer: COMMERCIAL

## 2018-01-20 DIAGNOSIS — I10 ESSENTIAL HYPERTENSION: ICD-10-CM

## 2018-01-20 DIAGNOSIS — E55.9 VITAMIN D INSUFFICIENCY: ICD-10-CM

## 2018-01-20 DIAGNOSIS — E78.00 PURE HYPERCHOLESTEROLEMIA: ICD-10-CM

## 2018-01-20 LAB
25(OH)D3 SERPL-MCNC: 27 NG/ML (ref 30–100)
ALBUMIN SERPL BCP-MCNC: 5 G/DL (ref 3.2–4.9)
ALBUMIN/GLOB SERPL: 2.6 G/DL
ALP SERPL-CCNC: 54 U/L (ref 30–99)
ALT SERPL-CCNC: 26 U/L (ref 2–50)
ANION GAP SERPL CALC-SCNC: 7 MMOL/L (ref 0–11.9)
AST SERPL-CCNC: 25 U/L (ref 12–45)
BILIRUB SERPL-MCNC: 0.5 MG/DL (ref 0.1–1.5)
BUN SERPL-MCNC: 9 MG/DL (ref 8–22)
CALCIUM SERPL-MCNC: 9.7 MG/DL (ref 8.5–10.5)
CHLORIDE SERPL-SCNC: 107 MMOL/L (ref 96–112)
CHOLEST SERPL-MCNC: 207 MG/DL (ref 100–199)
CO2 SERPL-SCNC: 28 MMOL/L (ref 20–33)
CREAT SERPL-MCNC: 0.83 MG/DL (ref 0.5–1.4)
GLOBULIN SER CALC-MCNC: 1.9 G/DL (ref 1.9–3.5)
GLUCOSE SERPL-MCNC: 90 MG/DL (ref 65–99)
HDLC SERPL-MCNC: 62 MG/DL
LDLC SERPL CALC-MCNC: 122 MG/DL
MAGNESIUM SERPL-MCNC: 2.1 MG/DL (ref 1.5–2.5)
POTASSIUM SERPL-SCNC: 4.8 MMOL/L (ref 3.6–5.5)
PROT SERPL-MCNC: 6.9 G/DL (ref 6–8.2)
SODIUM SERPL-SCNC: 142 MMOL/L (ref 135–145)
TRIGL SERPL-MCNC: 113 MG/DL (ref 0–149)

## 2018-01-20 PROCEDURE — 36415 COLL VENOUS BLD VENIPUNCTURE: CPT

## 2018-01-20 PROCEDURE — 82306 VITAMIN D 25 HYDROXY: CPT

## 2018-01-20 PROCEDURE — 80061 LIPID PANEL: CPT

## 2018-01-20 PROCEDURE — 83735 ASSAY OF MAGNESIUM: CPT

## 2018-01-20 PROCEDURE — 80053 COMPREHEN METABOLIC PANEL: CPT

## 2018-01-25 ENCOUNTER — OFFICE VISIT (OUTPATIENT)
Dept: MEDICAL GROUP | Age: 58
End: 2018-01-25
Payer: COMMERCIAL

## 2018-01-25 VITALS
DIASTOLIC BLOOD PRESSURE: 68 MMHG | SYSTOLIC BLOOD PRESSURE: 134 MMHG | WEIGHT: 147.4 LBS | TEMPERATURE: 98.9 F | HEIGHT: 63 IN | BODY MASS INDEX: 26.12 KG/M2 | HEART RATE: 70 BPM | OXYGEN SATURATION: 96 %

## 2018-01-25 DIAGNOSIS — E78.00 PURE HYPERCHOLESTEROLEMIA: ICD-10-CM

## 2018-01-25 DIAGNOSIS — I10 ESSENTIAL HYPERTENSION: ICD-10-CM

## 2018-01-25 DIAGNOSIS — E55.9 VITAMIN D INSUFFICIENCY: ICD-10-CM

## 2018-01-25 DIAGNOSIS — F51.01 PRIMARY INSOMNIA: ICD-10-CM

## 2018-01-25 DIAGNOSIS — N95.1 HOT FLASHES, MENOPAUSAL: ICD-10-CM

## 2018-01-25 PROCEDURE — 99214 OFFICE O/P EST MOD 30 MIN: CPT | Performed by: INTERNAL MEDICINE

## 2018-01-25 RX ORDER — ZOLPIDEM TARTRATE 5 MG/1
2.5 TABLET ORAL NIGHTLY PRN
Qty: 15 TAB | Refills: 0 | Status: SHIPPED | OUTPATIENT
Start: 2018-01-25 | End: 2018-02-24

## 2018-01-25 RX ORDER — ATORVASTATIN CALCIUM 20 MG/1
20 TABLET, FILM COATED ORAL EVERY EVENING
Qty: 30 TAB | Refills: 11 | Status: SHIPPED | OUTPATIENT
Start: 2018-01-25 | End: 2018-06-27 | Stop reason: CLARIF

## 2018-01-25 RX ORDER — PAROXETINE 7.5 MG/1
CAPSULE ORAL
Refills: 3 | COMMUNITY
Start: 2018-01-10 | End: 2018-06-05

## 2018-01-25 RX ORDER — ACETAMINOPHEN 325 MG/1
650 TABLET ORAL EVERY 4 HOURS PRN
COMMUNITY
End: 2018-06-05

## 2018-01-25 ASSESSMENT — PAIN SCALES - GENERAL: PAINLEVEL: NO PAIN

## 2018-01-25 NOTE — ASSESSMENT & PLAN NOTE
Patient is taking metoprolol SR 25 mg daily. She denies side effects from taking it. Her blood pressure is stable and well-controlled with current regimens.

## 2018-01-25 NOTE — ASSESSMENT & PLAN NOTE
Patient started taking Lipitor 10 mg every evening on 10/25/17. Cholesterol level significantly improved after treated with Lipitor. However, LDL is not at goal yet. We discussed to increase Lipitor to 20 mg daily. Patient agreed to increase Lipitor to 20 mg. Her liver enzymes are within normal. I reviewed her blood tests with her in clinic.    Results for CLAY VALENTINE (MRN [id number]) as of 1/24/2018 19:45   Ref. Range 10/19/2017 08:10 1/20/2018 08:04   Cholesterol,Tot Latest Ref Range: 100 - 199 mg/dL 277 (H) 207 (H)   Triglycerides Latest Ref Range: 0 - 149 mg/dL 146 113   HDL Latest Ref Range: >=40 mg/dL 67 62   LDL Latest Ref Range: <100 mg/dL 181 (H) 122 (H)

## 2018-01-25 NOTE — ASSESSMENT & PLAN NOTE
Patient stated that she is not taking vitamin D 4000 units regularly. Her vitamin D level does not improve. Recent vitamin D level was stable at 27 on 1/20/18. Previous vitamin D level was also 27 on 10/19/17. We discussed to take ergocalciferol once a week. However, patient still want to take over-the-counter vitamin D 4000 units daily. She will try to remember to take supplements every day.

## 2018-01-26 NOTE — ASSESSMENT & PLAN NOTE
This is a new problem. Patient reported that she has difficulty to fall asleep started 3 weeks ago. She was recently treated with Paxil by gynecologist for postmenopausal hot flashes. She used to take it at night. She noticed difficult to fall asleep and she switches Paxil to morning dose last week. However, she still has difficulty to fall asleep at night. She would like to Sleep medication. She tried over-the-counter melatonin, Benadryl and did not feel helpful. She tried Ambien low dose in the past that helped her sleep well. She will like to retry Ambien with low dose. She denied drinking alcohol. She reported drinking 2 cups of coffee in the morning and a lot of tea in the daytime. Patient reported having palpitation frequently. We discussed to cut down caffeine intake.

## 2018-01-26 NOTE — ASSESSMENT & PLAN NOTE
Patient is taking Paxil 7.5 mg daily as prescribed by gynecologist, Dr. Joelle Valentin. She stated that hot flashes initially improved with medication, but she has some breakthrough symptoms now. She switches Paxil to morning dose as she feels difficult to fall asleep if she take Paxil at night.

## 2018-01-26 NOTE — PROGRESS NOTES
Subjective:   Danii Rodriguez is a 57 y.o. female here today for evaluation and management of:      Vitamin D insufficiency  Patient stated that she is not taking vitamin D 4000 units regularly. Her vitamin D level does not improve. Recent vitamin D level was stable at 27 on 1/20/18. Previous vitamin D level was also 27 on 10/19/17. We discussed to take ergocalciferol once a week. However, patient still want to take over-the-counter vitamin D 4000 units daily. She will try to remember to take supplements every day.    Pure hypercholesterolemia  Patient started taking Lipitor 10 mg every evening on 10/25/17. Cholesterol level significantly improved after treated with Lipitor. However, LDL is not at goal yet. We discussed to increase Lipitor to 20 mg daily. Patient agreed to increase Lipitor to 20 mg. Her liver enzymes are within normal. I reviewed her blood tests with her in clinic.    Results for DANII RODRIGUEZ (MRN [id number]) as of 1/24/2018 19:45   Ref. Range 10/19/2017 08:10 1/20/2018 08:04   Cholesterol,Tot Latest Ref Range: 100 - 199 mg/dL 277 (H) 207 (H)   Triglycerides Latest Ref Range: 0 - 149 mg/dL 146 113   HDL Latest Ref Range: >=40 mg/dL 67 62   LDL Latest Ref Range: <100 mg/dL 181 (H) 122 (H)       Essential hypertension  Patient is taking metoprolol SR 25 mg daily. She denies side effects from taking it. Her blood pressure is stable and well-controlled with current regimens.    Primary insomnia  This is a new problem. Patient reported that she has difficulty to fall asleep started 3 weeks ago. She was recently treated with Paxil by gynecologist for postmenopausal hot flashes. She used to take it at night. She noticed difficult to fall asleep and she switches Paxil to morning dose last week. However, she still has difficulty to fall asleep at night. She would like to Sleep medication. She tried over-the-counter melatonin, Benadryl and did not feel helpful. She tried Ambien low dose in the past  that helped her sleep well. She will like to retry Ambien with low dose. She denied drinking alcohol. She reported drinking 2 cups of coffee in the morning and a lot of tea in the daytime. Patient reported having palpitation frequently. We discussed to cut down caffeine intake.    Hot flashes, menopausal  Patient is taking Paxil 7.5 mg daily as prescribed by gynecologist, Dr. Joelle Valentin. She stated that hot flashes initially improved with medication, but she has some breakthrough symptoms now. She switches Paxil to morning dose as she feels difficult to fall asleep if she take Paxil at night.         Current medicines (including changes today)  Current Outpatient Prescriptions   Medication Sig Dispense Refill   • acetaminophen (TYLENOL) 325 MG Tab Take 650 mg by mouth every four hours as needed.     • PARoxetine Mesylate 7.5 MG Cap TAKE ONE CAPSULE BY MOUTH AT BEDTIME  3   • zolpidem (AMBIEN) 5 MG Tab Take 0.5 Tabs by mouth at bedtime as needed for Sleep for up to 30 days. 15 Tab 0   • atorvastatin (LIPITOR) 20 MG Tab Take 1 Tab by mouth every evening. 30 Tab 11   • metoprolol SR (TOPROL XL) 25 MG TABLET SR 24 HR TAKE 1 TAB BY MOUTH EVERY DAY. 30 Tab 6   • TURMERIC PO Take  by mouth.     • coenzyme Q-10 100 MG Cap capsule Take 100 mg by mouth every day.     • fluticasone (FLONASE) 50 MCG/ACT nasal spray SPRAY 2 SPRAYS IN NOSE EVERY DAY. 1 Bottle 6   • omeprazole (PRILOSEC) 40 MG delayed-release capsule Take 40 mg by mouth.  5   • Probiotic Product (PROBIOTIC COLON SUPPORT PO) Take  by mouth.     • Multiple Vitamins-Minerals (MULTIVITAL PO) Take  by mouth.     • Cholecalciferol (VITAMIN D3) 2000 UNIT Cap Take 4,000 Units by mouth.     • loratadine (CLARITIN) 10 MG TABS Take 10 mg by mouth every day.       No current facility-administered medications for this visit.      She  has a past medical history of Anesthesia; Indigestion; MRSA (methicillin resistant staph aureus) culture positive (2010); Unspecified  "disorder of thyroid; and Unspecified urinary incontinence.    ROS   No chest pain, no shortness of breath, no abdominal pain       Objective:     Blood pressure 134/68, pulse 70, temperature 37.2 °C (98.9 °F), height 1.6 m (5' 3\"), weight 66.9 kg (147 lb 6.4 oz), SpO2 96 %, not currently breastfeeding. Body mass index is 26.11 kg/m².   Physical Exam:  General: Alert, oriented and no acute distress.  Eye contact is good, speech goal directed, affect calm  HEENT: conjunctiva non-injected, sclera non-icteric.  Oral mucous membranes pink and moist with no lesions.  Pinna normal.   Lungs: Normal respiratory effort, clear to auscultation bilaterally with good excursion.  CV: regular rate and rhythm. No murmurs.   Abdomen: soft, non distended, nontender, Bowel sound normal.  Ext: no edema, color normal, vascularity normal, temperature normal        Assessment and Plan:   The following treatment plan was discussed     1. Vitamin D insufficiency  - Not well controlled yet. Patient forgot to take vitamin D supplement regularly. She will try to take vitamin D 4000 units daily. We will recheck labs in 6 months.  - VITAMIN D,25 HYDROXY; Future    2. Pure hypercholesterolemia  - Cholesterol, improved but not at goal yet. We discussed to increase Lipitor to 20 mg every evening. Patient agreed with the plan.  - Advised to eat low fat, low carbohydrate and high fiber diet as well as do cardio physical exercise regularly.   - atorvastatin (LIPITOR) 20 MG Tab; Take 1 Tab by mouth every evening.  Dispense: 30 Tab; Refill: 11  - COMP METABOLIC PANEL; Future  - LIPID PROFILE; Future    3. Essential hypertension  - Well-controlled. Continue current regimen, metoprolol 25 mg daily. Recheck lab 1-2 weeks before next follow up visit.  - Recommend to monitor blood pressure and heart rate at home.  - Recommend to limit use of caffeine throughout the day.  - COMP METABOLIC PANEL; Future  - CBC WITH DIFFERENTIAL; Future    4. Primary insomnia  - " Discussed sleep hygiene. Go to bed and wake up at consistent times whether work/school day or not. Keep room dark, quiet, and comfortable. Don't nap in late afternoon and don't nap more than an hour. Increase exposure to sunlight during awake times and avoid bright lights before going to sleep. Avoid stimulant or caffeine use more than 4 hours after wake time.   - Will try low-dose Ambien 2.5 mg daily at bedtime when necessary.  - Discussed the risks and side effects of medication with patient including risk of dependency and recommended to rare use only.  - Advised to be cautious for sedation effect of Ambien and recommend not to take it with alcohol or other sedating medications. Recommend to avoid driving or operating heavy machinery when she feels drowsy or sleepy from taking medication.  - zolpidem (AMBIEN) 5 MG Tab; Take 0.5 Tabs by mouth at bedtime as needed for Sleep for up to 30 days.  Dispense: 15 Tab; Refill: 0  - COMP METABOLIC PANEL; Future  - CBC WITH DIFFERENTIAL; Future    5. Hot flashes, menopausal  - We discussed to continue Paxil 7.5 mg daily as prescribed by her gynecologist. We also discussed to maintain a healthy diet and regular physical exercise. I advised her to limit The use. We will continue to monitor.    6. Health maintenance  - Patient has Pap smear with gynecologist last year. She stated that her Pap smear result was normal.    Followup: Return in about 6 months (around 7/25/2018), or if symptoms worsen or fail to improve, for hypertension, hyperlipidemia, insomnia. Vitamin D insufficiency. Lab review.      Please note that this dictation was created using voice recognition software. I have made every reasonable attempt to correct obvious errors, but I expect that there may have unintended errors in text, spelling, punctuation, or grammar that I did not discover.

## 2018-04-11 ENCOUNTER — TELEPHONE (OUTPATIENT)
Dept: MEDICAL GROUP | Age: 58
End: 2018-04-11

## 2018-04-12 ENCOUNTER — TELEPHONE (OUTPATIENT)
Dept: MEDICAL GROUP | Age: 58
End: 2018-04-12

## 2018-04-12 NOTE — TELEPHONE ENCOUNTER
Please inform patient that she can stop taking metoprolol XL as she already cut down half dose and her blood pressure is not high. She needs to continue monitor her blood pressure closely. If her blood pressure increases in the future, we can switch to different blood pressure medication that does not have interaction with allergy treatment.    Thanks!  Juany Ralph M.D.

## 2018-04-12 NOTE — TELEPHONE ENCOUNTER
1. Caller Name: Danii Rodriguez                                           Call Back Number: [phone] (home)     Dr. Berry telling pt that they wont give pt. Serum for allergy shots when on blood pressure pills because it causes Constricts blood vessels and lungs. Pt reports  fatique and  SOB then she uses albuterol which seems to magnifiy issue breath test down 30% pt. Having trouble waking up (fatique) oxygen down pt attributes this to interaction between serum and bp pills.  Pt states she has been taking bp meds relegiously and has 120/70 bp, recently cut dose to 1/2 starting 04/10/18 since change bp remains 120/70   How long take half dose before she can quit taking bp pills.               Patient approves a detailed voicemail message: yes

## 2018-04-25 ENCOUNTER — OFFICE VISIT (OUTPATIENT)
Dept: URGENT CARE | Facility: CLINIC | Age: 58
End: 2018-04-25
Payer: COMMERCIAL

## 2018-04-25 VITALS
TEMPERATURE: 98.1 F | WEIGHT: 148 LBS | DIASTOLIC BLOOD PRESSURE: 80 MMHG | BODY MASS INDEX: 26.22 KG/M2 | SYSTOLIC BLOOD PRESSURE: 122 MMHG | OXYGEN SATURATION: 98 % | HEIGHT: 63 IN | RESPIRATION RATE: 20 BRPM | HEART RATE: 88 BPM

## 2018-04-25 DIAGNOSIS — R42 VERTIGO: ICD-10-CM

## 2018-04-25 DIAGNOSIS — H65.92 MIDDLE EAR EFFUSION, LEFT: ICD-10-CM

## 2018-04-25 DIAGNOSIS — J34.89 SINUS PAIN: ICD-10-CM

## 2018-04-25 PROCEDURE — 99214 OFFICE O/P EST MOD 30 MIN: CPT | Performed by: PHYSICIAN ASSISTANT

## 2018-04-25 RX ORDER — MECLIZINE HYDROCHLORIDE 25 MG/1
25 TABLET ORAL 3 TIMES DAILY PRN
Qty: 30 TAB | Refills: 0 | Status: SHIPPED | OUTPATIENT
Start: 2018-04-25 | End: 2018-06-05

## 2018-04-25 RX ORDER — CIPROFLOXACIN 0.5 MG/.25ML
0.25 SOLUTION/ DROPS AURICULAR (OTIC) EVERY 12 HOURS
Qty: 1 EACH | Refills: 0 | Status: SHIPPED | OUTPATIENT
Start: 2018-04-25 | End: 2018-05-02

## 2018-04-25 RX ORDER — AMOXICILLIN AND CLAVULANATE POTASSIUM 875; 125 MG/1; MG/1
1 TABLET, FILM COATED ORAL 2 TIMES DAILY
Qty: 20 TAB | Refills: 0 | Status: SHIPPED | OUTPATIENT
Start: 2018-04-25 | End: 2018-05-05

## 2018-04-25 ASSESSMENT — ENCOUNTER SYMPTOMS
NECK PAIN: 0
SPUTUM PRODUCTION: 0
DIARRHEA: 0
COUGH: 0
SORE THROAT: 0
VOMITING: 0
FOCAL WEAKNESS: 0
SHORTNESS OF BREATH: 0
TINGLING: 0
WHEEZING: 0
EYE PAIN: 0
SINUS PAIN: 1
BLURRED VISION: 0
CHILLS: 0
RHINORRHEA: 0
SENSORY CHANGE: 0
DOUBLE VISION: 0
FEVER: 0
PALPITATIONS: 0
HEADACHES: 1
DIZZINESS: 1
LOSS OF CONSCIOUSNESS: 0
ABDOMINAL PAIN: 0

## 2018-04-25 NOTE — LETTER
April 25, 2018         Patient: Danii Rodriguez   YOB: 1960   Date of Visit: 4/25/2018           To Whom it May Concern:    Danii Rodriguez was seen in my clinic on 4/25/2018. She is excused from driving at work through 4/27/18.    If you have any questions or concerns, please don't hesitate to call.        Sincerely,           Carri Cota P.A.-C.  Electronically Signed

## 2018-04-25 NOTE — PROGRESS NOTES
Subjective:      Danii Rodriguez is a 57 y.o. female who presents with Otalgia (Couple days left eraache , vertigo , sinus headache)            Otalgia    There is pain in the left ear. This is a recurrent problem. Episode onset: new episode x 7 days. The problem occurs constantly. The problem has been unchanged. There has been no fever. The pain is moderate (intermittent twinges to left ear). Associated symptoms include headaches. Pertinent negatives include no abdominal pain, coughing, diarrhea, ear discharge, hearing loss, neck pain, rash, rhinorrhea, sore throat or vomiting. Associated symptoms comments: Left ear pressure, left ear fullness, left sinus pain , vertigo(spinning) with head movement.. Treatments tried: Flonase, Clariting D, oil to left ear canal. The treatment provided no relief. Her past medical history is significant for a chronic ear infection and a tympanostomy tube (s).     Past Medical History:   Diagnosis Date   • Anesthesia     ponv   • Indigestion    • MRSA (methicillin resistant staph aureus) culture positive 2010   • Unspecified disorder of thyroid    • Unspecified urinary incontinence        Past Surgical History:   Procedure Laterality Date   • CARPAL TUNNEL ENDOSCOPIC  9/30/2014    Both side Performed by Edgar Leblanc M.D. at [facility] ORS   • ABDOMINAL HYSTERECTOMY TOTAL      Hysterectomy and removal Ovaries and tubes, Total Abdominal in 2000   • GYN SURGERY     • NASAL SEPTAL RECONSTRUCTION      2005   • OTHER SURGICAL PROCEDURE      Removal of polyps from urinary bladder and dilation of urethra in 2000   • OTHER SURGICAL PROCEDURE      bone graft surgery on left second toe in 2002 due to fracture   • OTHER SURGICAL PROCEDURE      I&D of left ear for ear infection twice in 2010 and 2011 by Dr Kahn, ENT       Family History   Problem Relation Age of Onset   • Cancer Paternal Grandmother    • Cancer Paternal Aunt    • Other Mother      Parkinson disease   • Bipolar  "disorder Mother    • Hypertension Mother    • Thyroid Mother      Hypothyroid   • Heart Disease Father      Congenital heart disease   • Hypertension Father    • Diabetes Father    • Stroke Sister    • Bipolar disorder Sister    • Other Sister      alcoholic        Allergies   Allergen Reactions   • Ciprofloxacin Hives     Okay to take cipro ear drops, no reaction with ear drop   • Nsaids      Pt has stomach issues when she takes anti-inflammatories.   • Other Environmental      seasonal   • Vicodin [Hydrocodone-Acetaminophen] Rash     Rash        Medications, Allergies, and current problem list reviewed today in Epic      Review of Systems   Constitutional: Negative for chills, fever and malaise/fatigue.   HENT: Positive for congestion, ear pain and sinus pain (left side ). Negative for ear discharge, hearing loss, rhinorrhea and sore throat.    Eyes: Negative for blurred vision, double vision and pain.   Respiratory: Negative for cough, sputum production, shortness of breath and wheezing.    Cardiovascular: Negative for chest pain, palpitations and leg swelling.   Gastrointestinal: Negative for abdominal pain, diarrhea and vomiting.   Musculoskeletal: Negative for neck pain.   Skin: Negative for rash.   Neurological: Positive for dizziness (dizziness with head movement.) and headaches. Negative for tingling, sensory change, focal weakness and loss of consciousness.     All other systems reviewed and are negative.        Objective:     /80   Pulse 88   Temp 36.7 °C (98.1 °F)   Resp 20   Ht 1.6 m (5' 3\")   Wt 67.1 kg (148 lb)   SpO2 98%   BMI 26.22 kg/m²      Physical Exam   Constitutional: She is oriented to person, place, and time. She appears well-developed and well-nourished. No distress.   HENT:   Head: Normocephalic and atraumatic.   Right Ear: Tympanic membrane, external ear and ear canal normal.   Left Ear: External ear and ear canal normal. There is swelling and tenderness (with manipulation of " tragus ). No drainage. Tympanic membrane is not injected. A middle ear effusion is present.   Ears:    Eyes: Conjunctivae and EOM are normal. Pupils are equal, round, and reactive to light.   Neck: Neck supple.   Cardiovascular: Normal rate, regular rhythm and normal heart sounds.  Exam reveals no gallop and no friction rub.    No murmur heard.  Pulmonary/Chest: Effort normal and breath sounds normal. No respiratory distress. She has no wheezes. She has no rales.   Lymphadenopathy:     She has no cervical adenopathy.   Neurological: She is alert and oriented to person, place, and time. No cranial nerve deficit.   CN II-XII intact. Cerebellar function  intact. Motor coordination intact.  strength strong and symmetrical bilaterally. Proprioception intact. Strength 5/5 equal in the upper and lower extremities. Facial features symmetric with equal movement. No focal deficits. Romberg negative.     Skin: Skin is warm and dry. No rash noted.   Psychiatric: She has a normal mood and affect. Her behavior is normal. Judgment and thought content normal.               Assessment/Plan:     1. Middle ear effusion, left  - amoxicillin-clavulanate (AUGMENTIN) 875-125 MG Tab; Take 1 Tab by mouth 2 times a day for 10 days.  Dispense: 20 Tab; Refill: 0  - REFERRAL TO ENT  - Ciprofloxacin HCl 0.2 % Solution; Place 0.25 mL in ear every 12 hours for 7 days.  Dispense: 1 Each; Refill: 0    2. Sinus pain    - amoxicillin-clavulanate (AUGMENTIN) 875-125 MG Tab; Take 1 Tab by mouth 2 times a day for 10 days.  Dispense: 20 Tab; Refill: 0  - REFERRAL TO ENT    3. Vertigo    - REFERRAL TO ENT  - meclizine (ANTIVERT) 25 MG Tab; Take 1 Tab by mouth 3 times a day as needed for Vertigo.  Dispense: 30 Tab; Refill: 0       Differential diagnoses, Supportive care, and indications for immediate follow-up discussed with patient.   Instructed to return to clinic or nearest emergency department for any change in condition, further concerns, or  worsening of symptoms.    The patient demonstrated a good understanding and agreed with the treatment plan.    Carri Cota P.A.-C.

## 2018-06-05 ENCOUNTER — APPOINTMENT (OUTPATIENT)
Dept: RADIOLOGY | Facility: MEDICAL CENTER | Age: 58
End: 2018-06-05
Attending: EMERGENCY MEDICINE
Payer: COMMERCIAL

## 2018-06-05 ENCOUNTER — HOSPITAL ENCOUNTER (OUTPATIENT)
Facility: MEDICAL CENTER | Age: 58
End: 2018-06-05
Attending: EMERGENCY MEDICINE | Admitting: INTERNAL MEDICINE
Payer: COMMERCIAL

## 2018-06-05 ENCOUNTER — APPOINTMENT (OUTPATIENT)
Dept: RADIOLOGY | Facility: MEDICAL CENTER | Age: 58
End: 2018-06-05
Attending: INTERNAL MEDICINE
Payer: COMMERCIAL

## 2018-06-05 VITALS
WEIGHT: 145.94 LBS | HEART RATE: 70 BPM | DIASTOLIC BLOOD PRESSURE: 49 MMHG | RESPIRATION RATE: 20 BRPM | HEIGHT: 63 IN | BODY MASS INDEX: 25.86 KG/M2 | OXYGEN SATURATION: 94 % | SYSTOLIC BLOOD PRESSURE: 105 MMHG | TEMPERATURE: 98.1 F

## 2018-06-05 DIAGNOSIS — R07.2 PRECORDIAL PAIN: ICD-10-CM

## 2018-06-05 DIAGNOSIS — I15.9 SECONDARY HYPERTENSION: ICD-10-CM

## 2018-06-05 PROBLEM — I16.0 HYPERTENSIVE URGENCY: Status: ACTIVE | Noted: 2018-06-05

## 2018-06-05 LAB
ALBUMIN SERPL BCP-MCNC: 4.3 G/DL (ref 3.2–4.9)
ALBUMIN/GLOB SERPL: 1.7 G/DL
ALP SERPL-CCNC: 64 U/L (ref 30–99)
ALT SERPL-CCNC: 34 U/L (ref 2–50)
ANION GAP SERPL CALC-SCNC: 6 MMOL/L (ref 0–11.9)
APTT PPP: 26.8 SEC (ref 24.7–36)
AST SERPL-CCNC: 27 U/L (ref 12–45)
BASOPHILS # BLD AUTO: 0.5 % (ref 0–1.8)
BASOPHILS # BLD: 0.03 K/UL (ref 0–0.12)
BILIRUB SERPL-MCNC: 0.7 MG/DL (ref 0.1–1.5)
BNP SERPL-MCNC: 14 PG/ML (ref 0–100)
BUN SERPL-MCNC: 15 MG/DL (ref 8–22)
CALCIUM SERPL-MCNC: 9.3 MG/DL (ref 8.4–10.2)
CHLORIDE SERPL-SCNC: 104 MMOL/L (ref 96–112)
CO2 SERPL-SCNC: 26 MMOL/L (ref 20–33)
CREAT SERPL-MCNC: 0.92 MG/DL (ref 0.5–1.4)
DEPRECATED D DIMER PPP IA-ACNC: <200 NG/ML(D-DU)
EKG IMPRESSION: NORMAL
EOSINOPHIL # BLD AUTO: 0.08 K/UL (ref 0–0.51)
EOSINOPHIL NFR BLD: 1.5 % (ref 0–6.9)
ERYTHROCYTE [DISTWIDTH] IN BLOOD BY AUTOMATED COUNT: 41.2 FL (ref 35.9–50)
GLOBULIN SER CALC-MCNC: 2.5 G/DL (ref 1.9–3.5)
GLUCOSE SERPL-MCNC: 98 MG/DL (ref 65–99)
HCT VFR BLD AUTO: 42.1 % (ref 37–47)
HGB BLD-MCNC: 14.1 G/DL (ref 12–16)
IMM GRANULOCYTES # BLD AUTO: 0.02 K/UL (ref 0–0.11)
IMM GRANULOCYTES NFR BLD AUTO: 0.4 % (ref 0–0.9)
INR PPP: 0.9 (ref 0.87–1.13)
LIPASE SERPL-CCNC: 27 U/L (ref 7–58)
LYMPHOCYTES # BLD AUTO: 1.64 K/UL (ref 1–4.8)
LYMPHOCYTES NFR BLD: 30 % (ref 22–41)
MCH RBC QN AUTO: 30.7 PG (ref 27–33)
MCHC RBC AUTO-ENTMCNC: 33.5 G/DL (ref 33.6–35)
MCV RBC AUTO: 91.5 FL (ref 81.4–97.8)
MONOCYTES # BLD AUTO: 0.45 K/UL (ref 0–0.85)
MONOCYTES NFR BLD AUTO: 8.2 % (ref 0–13.4)
NEUTROPHILS # BLD AUTO: 3.25 K/UL (ref 2–7.15)
NEUTROPHILS NFR BLD: 59.4 % (ref 44–72)
NRBC # BLD AUTO: 0 K/UL
NRBC BLD-RTO: 0 /100 WBC
PLATELET # BLD AUTO: 269 K/UL (ref 164–446)
PMV BLD AUTO: 9.9 FL (ref 9–12.9)
POTASSIUM SERPL-SCNC: 3.7 MMOL/L (ref 3.6–5.5)
PROT SERPL-MCNC: 6.8 G/DL (ref 6–8.2)
PROTHROMBIN TIME: 12.1 SEC (ref 12–14.6)
RBC # BLD AUTO: 4.6 M/UL (ref 4.2–5.4)
SODIUM SERPL-SCNC: 136 MMOL/L (ref 135–145)
T4 FREE SERPL-MCNC: 0.82 NG/DL (ref 0.58–1.64)
TROPONIN I SERPL-MCNC: <0.02 NG/ML (ref 0–0.04)
TROPONIN I SERPL-MCNC: <0.02 NG/ML (ref 0–0.04)
TSH SERPL DL<=0.005 MIU/L-ACNC: 3.34 UIU/ML (ref 0.38–5.33)
WBC # BLD AUTO: 5.5 K/UL (ref 4.8–10.8)

## 2018-06-05 PROCEDURE — 71045 X-RAY EXAM CHEST 1 VIEW: CPT

## 2018-06-05 PROCEDURE — 85610 PROTHROMBIN TIME: CPT

## 2018-06-05 PROCEDURE — 85730 THROMBOPLASTIN TIME PARTIAL: CPT

## 2018-06-05 PROCEDURE — A9270 NON-COVERED ITEM OR SERVICE: HCPCS | Performed by: INTERNAL MEDICINE

## 2018-06-05 PROCEDURE — 85379 FIBRIN DEGRADATION QUANT: CPT

## 2018-06-05 PROCEDURE — A9270 NON-COVERED ITEM OR SERVICE: HCPCS | Performed by: EMERGENCY MEDICINE

## 2018-06-05 PROCEDURE — G0378 HOSPITAL OBSERVATION PER HR: HCPCS

## 2018-06-05 PROCEDURE — A9502 TC99M TETROFOSMIN: HCPCS

## 2018-06-05 PROCEDURE — 700102 HCHG RX REV CODE 250 W/ 637 OVERRIDE(OP): Performed by: EMERGENCY MEDICINE

## 2018-06-05 PROCEDURE — 83690 ASSAY OF LIPASE: CPT

## 2018-06-05 PROCEDURE — 93005 ELECTROCARDIOGRAM TRACING: CPT | Performed by: EMERGENCY MEDICINE

## 2018-06-05 PROCEDURE — 85025 COMPLETE CBC W/AUTO DIFF WBC: CPT

## 2018-06-05 PROCEDURE — 700111 HCHG RX REV CODE 636 W/ 250 OVERRIDE (IP)

## 2018-06-05 PROCEDURE — 80053 COMPREHEN METABOLIC PANEL: CPT

## 2018-06-05 PROCEDURE — 84439 ASSAY OF FREE THYROXINE: CPT

## 2018-06-05 PROCEDURE — 99219 PR INITIAL OBSERVATION CARE,LEVL II: CPT | Performed by: INTERNAL MEDICINE

## 2018-06-05 PROCEDURE — 700102 HCHG RX REV CODE 250 W/ 637 OVERRIDE(OP): Performed by: INTERNAL MEDICINE

## 2018-06-05 PROCEDURE — 84443 ASSAY THYROID STIM HORMONE: CPT

## 2018-06-05 PROCEDURE — 36415 COLL VENOUS BLD VENIPUNCTURE: CPT

## 2018-06-05 PROCEDURE — 84484 ASSAY OF TROPONIN QUANT: CPT

## 2018-06-05 PROCEDURE — 99285 EMERGENCY DEPT VISIT HI MDM: CPT

## 2018-06-05 PROCEDURE — 83880 ASSAY OF NATRIURETIC PEPTIDE: CPT

## 2018-06-05 RX ORDER — ONDANSETRON 2 MG/ML
4 INJECTION INTRAMUSCULAR; INTRAVENOUS EVERY 4 HOURS PRN
Status: DISCONTINUED | OUTPATIENT
Start: 2018-06-05 | End: 2018-06-05 | Stop reason: HOSPADM

## 2018-06-05 RX ORDER — ASPIRIN 600 MG/1
300 SUPPOSITORY RECTAL ONCE
Status: COMPLETED | OUTPATIENT
Start: 2018-06-05 | End: 2018-06-05

## 2018-06-05 RX ORDER — LISINOPRIL 20 MG/1
20 TABLET ORAL
Status: DISCONTINUED | OUTPATIENT
Start: 2018-06-05 | End: 2018-06-05 | Stop reason: HOSPADM

## 2018-06-05 RX ORDER — FLUTICASONE PROPIONATE 50 MCG
2 SPRAY, SUSPENSION (ML) NASAL DAILY
Status: DISCONTINUED | OUTPATIENT
Start: 2018-06-05 | End: 2018-06-05 | Stop reason: HOSPADM

## 2018-06-05 RX ORDER — ONDANSETRON 4 MG/1
4 TABLET, ORALLY DISINTEGRATING ORAL EVERY 4 HOURS PRN
Status: DISCONTINUED | OUTPATIENT
Start: 2018-06-05 | End: 2018-06-05 | Stop reason: HOSPADM

## 2018-06-05 RX ORDER — REGADENOSON 0.08 MG/ML
INJECTION, SOLUTION INTRAVENOUS
Status: COMPLETED
Start: 2018-06-05 | End: 2018-06-05

## 2018-06-05 RX ORDER — PROMETHAZINE HYDROCHLORIDE 25 MG/1
12.5-25 SUPPOSITORY RECTAL EVERY 4 HOURS PRN
Status: DISCONTINUED | OUTPATIENT
Start: 2018-06-05 | End: 2018-06-05 | Stop reason: HOSPADM

## 2018-06-05 RX ORDER — LABETALOL HYDROCHLORIDE 5 MG/ML
10 INJECTION, SOLUTION INTRAVENOUS EVERY 4 HOURS PRN
Status: DISCONTINUED | OUTPATIENT
Start: 2018-06-05 | End: 2018-06-05 | Stop reason: HOSPADM

## 2018-06-05 RX ORDER — ASPIRIN 81 MG/1
81 TABLET ORAL DAILY
Qty: 30 TAB | COMMUNITY
Start: 2018-06-06 | End: 2019-04-18

## 2018-06-05 RX ORDER — AMOXICILLIN 250 MG
2 CAPSULE ORAL 2 TIMES DAILY
Status: DISCONTINUED | OUTPATIENT
Start: 2018-06-05 | End: 2018-06-05 | Stop reason: HOSPADM

## 2018-06-05 RX ORDER — OMEPRAZOLE 20 MG/1
40 CAPSULE, DELAYED RELEASE ORAL DAILY
Status: DISCONTINUED | OUTPATIENT
Start: 2018-06-05 | End: 2018-06-05 | Stop reason: HOSPADM

## 2018-06-05 RX ORDER — LISINOPRIL 20 MG/1
20 TABLET ORAL DAILY
Qty: 30 TAB | Refills: 2 | Status: SHIPPED | OUTPATIENT
Start: 2018-06-06 | End: 2018-09-09 | Stop reason: SDUPTHER

## 2018-06-05 RX ORDER — ACETAMINOPHEN 325 MG/1
650 TABLET ORAL EVERY 6 HOURS PRN
Status: DISCONTINUED | OUTPATIENT
Start: 2018-06-05 | End: 2018-06-05 | Stop reason: HOSPADM

## 2018-06-05 RX ORDER — ASPIRIN 81 MG/1
324 TABLET, CHEWABLE ORAL ONCE
Status: COMPLETED | OUTPATIENT
Start: 2018-06-05 | End: 2018-06-05

## 2018-06-05 RX ORDER — ATORVASTATIN CALCIUM 40 MG/1
20 TABLET, FILM COATED ORAL EVERY EVENING
Status: DISCONTINUED | OUTPATIENT
Start: 2018-06-05 | End: 2018-06-05 | Stop reason: HOSPADM

## 2018-06-05 RX ORDER — BISACODYL 10 MG
10 SUPPOSITORY, RECTAL RECTAL
Status: DISCONTINUED | OUTPATIENT
Start: 2018-06-05 | End: 2018-06-05 | Stop reason: HOSPADM

## 2018-06-05 RX ORDER — POLYETHYLENE GLYCOL 3350 17 G/17G
1 POWDER, FOR SOLUTION ORAL
Status: DISCONTINUED | OUTPATIENT
Start: 2018-06-05 | End: 2018-06-05 | Stop reason: HOSPADM

## 2018-06-05 RX ORDER — PROMETHAZINE HYDROCHLORIDE 25 MG/1
12.5-25 TABLET ORAL EVERY 4 HOURS PRN
Status: DISCONTINUED | OUTPATIENT
Start: 2018-06-05 | End: 2018-06-05 | Stop reason: HOSPADM

## 2018-06-05 RX ADMIN — REGADENOSON 0.4 MG: 0.08 INJECTION, SOLUTION INTRAVENOUS at 12:12

## 2018-06-05 RX ADMIN — LISINOPRIL 20 MG: 20 TABLET ORAL at 11:17

## 2018-06-05 RX ADMIN — ACETAMINOPHEN 650 MG: 325 TABLET, FILM COATED ORAL at 14:56

## 2018-06-05 RX ADMIN — ASPIRIN 81 MG 324 MG: 81 TABLET ORAL at 07:54

## 2018-06-05 RX ADMIN — NITROGLYCERIN 1 INCH: 20 OINTMENT TOPICAL at 07:55

## 2018-06-05 ASSESSMENT — ENCOUNTER SYMPTOMS
WEAKNESS: 0
ABDOMINAL PAIN: 0
BACK PAIN: 0
HEADACHES: 0
BLOOD IN STOOL: 0
SORE THROAT: 0
NAUSEA: 0
FOCAL WEAKNESS: 0
PALPITATIONS: 0
MYALGIAS: 0
HALLUCINATIONS: 0
SHORTNESS OF BREATH: 0
DIARRHEA: 0
COUGH: 0
HEARTBURN: 0
DIZZINESS: 0
DEPRESSION: 0
FEVER: 0
NERVOUS/ANXIOUS: 1
VOMITING: 0
CHILLS: 0

## 2018-06-05 ASSESSMENT — LIFESTYLE VARIABLES
TOTAL SCORE: 0
ALCOHOL_USE: YES
HAVE PEOPLE ANNOYED YOU BY CRITICIZING YOUR DRINKING: NO
TOTAL SCORE: 0
ON A TYPICAL DAY WHEN YOU DRINK ALCOHOL HOW MANY DRINKS DO YOU HAVE: 2
HAVE YOU EVER FELT YOU SHOULD CUT DOWN ON YOUR DRINKING: NO
CONSUMPTION TOTAL: NEGATIVE
EVER FELT BAD OR GUILTY ABOUT YOUR DRINKING: NO
HOW MANY TIMES IN THE PAST YEAR HAVE YOU HAD 5 OR MORE DRINKS IN A DAY: 0
EVER_SMOKED: NEVER
TOTAL SCORE: 0
AVERAGE NUMBER OF DAYS PER WEEK YOU HAVE A DRINK CONTAINING ALCOHOL: 3
EVER HAD A DRINK FIRST THING IN THE MORNING TO STEADY YOUR NERVES TO GET RID OF A HANGOVER: NO

## 2018-06-05 ASSESSMENT — COPD QUESTIONNAIRES
HAVE YOU SMOKED AT LEAST 100 CIGARETTES IN YOUR ENTIRE LIFE: NO/DON'T KNOW
DURING THE PAST 4 WEEKS HOW MUCH DID YOU FEEL SHORT OF BREATH: NONE/LITTLE OF THE TIME
DO YOU EVER COUGH UP ANY MUCUS OR PHLEGM?: NO/ONLY WITH OCCASIONAL COLDS OR INFECTIONS
COPD SCREENING SCORE: 1
IN THE PAST 12 MONTHS DO YOU DO LESS THAN YOU USED TO BECAUSE OF YOUR BREATHING PROBLEMS: DISAGREE/UNSURE

## 2018-06-05 NOTE — ED NOTES
Med Rec completed per patient  Allergies reviewed    patient completed a 10 day Augmentin course 5-5-18 for ear infection

## 2018-06-05 NOTE — CARE PLAN
Problem: Safety  Goal: Will remain free from injury  Patient alert and oriented, ambulating without the need of assistance. While in bed patient bed in low position with wheels of bed locked and call bell at side.     Problem: Knowledge Deficit  Goal: Knowledge of disease process/condition, treatment plan, diagnostic tests, and medications will improve  The plan of care for today discussed with patient (rest, stress test, possible discharge home today)

## 2018-06-05 NOTE — PROGRESS NOTES
Telemetry Summary    Rhythm Interpretation: Sinus Rhythm  Heart Rate: 63  MO Interval: 0.16  QRS Interval: 0.06  QT Interval: 0.42

## 2018-06-05 NOTE — ASSESSMENT & PLAN NOTE
I suspect this is related to her elevated blood pressure as she was in the 200/100 range on admission.  Monitor oximetry, trend troponins.  Perform stress test if troponins are negative  Add lisinopril for blood pressure control  Check A1c and lipid panel

## 2018-06-05 NOTE — PROGRESS NOTES
Patient educated re: Pharmacological NM MPI. Nursing goals identified: knowledge deficit, potential for anxiety r/t stress test, potential for compromised cardiac output. Care plan includes educating patient, reassurance and access to ACLS cart/team. Labs and ECG reviewed. Pt denies CP. No caffeine and NPO confirmed. After resting images attained, patient prepped for pharmacological stress. Lexiscan given while patient ambulated on TM. Patient reported these symptoms: SOB, anxiety, lightheadedness, nausea, headache. Caffeinated beverage provided. Symptoms resolved.

## 2018-06-05 NOTE — PROGRESS NOTES
Late Entry    6/5/18, 1000- Patient admitted to floor from the emergency department. Patient to have a cardiac nuclear stress test today. Admission assessment and profile completed.     6/5/18, 1130- Patient taken downstairs for cardiac nuclear stress test.     6/5/18, 1305- Patient back to floor following stress test. Probable discharge in patient's future following results of stress test.

## 2018-06-05 NOTE — PROGRESS NOTES
"Report received from Brittany HOLT and Kishor HOLT. Patient is NPO, has not had caffeine in 24 hours.  Second troponin due at 11:30.  Will proceed with NM cardiac stress test as soon as trops are drawn.  Please order trop draw as a \"STAT\" order.  Confirmed with KAMALA Roe technologist.    "

## 2018-06-05 NOTE — ED PROVIDER NOTES
ED Provider Note  CHIEF COMPLAINT  Chief Complaint   Patient presents with   • Irregular Heart Beat   • Chest Pressure   • Nausea       HPI  Daniiher Rachel Rodriguez is a 57 y.o. female who presents to the Emergency Department with chest pain. She had been on blood pressure medication until a few months ago when she noticed she didn't need it again.  She is feeling a sensation of her heart racing and starts and lasts up to two hours and almost feels like her chest is vibrating.  On Saturday she was working in the yard and then felt pressure in chest, nausea and a feeling like she is going to pass out. She has had anxiety attacks in the past but this is different.  She feels no diaphoresis.  Pressure is more like indigestion, but no pain after eating.  She states she feels fatigued and weak.    CAD Risk factor review:  No prior testing for CAD, positive dislipidemia, positive family history--father passed away from heart disease at 56, no diabetes, few years smoking at 17, no obesity, positive hypertension, no cocaine or methamphetamines.    Pulmonary Embolist risk factor review:  no recent surgery, no history of DVT or PE, no hemoptysis, no unilateral leg swelling, no malignancy, no BCP or hormone therapy.    REVIEW OF SYSTEMS   As above all other systems are negative.    PAST MEDICAL HISTORY   has a past medical history of Anesthesia; Hypertension; Indigestion; MRSA (methicillin resistant staph aureus) culture positive (2010); Unspecified disorder of thyroid; and Unspecified urinary incontinence.    FAMILY HISTORY  Family History   Problem Relation Age of Onset   • Cancer Paternal Grandmother    • Cancer Paternal Aunt    • Other Mother      Parkinson disease   • Bipolar disorder Mother    • Hypertension Mother    • Thyroid Mother      Hypothyroid   • Heart Disease Father      Congenital heart disease   • Hypertension Father    • Diabetes Father    • Stroke Sister    • Bipolar disorder Sister    • Other Sister       alcoholic         SOCIAL HISTORY  Social History     Social History Main Topics   • Smoking status: Former Smoker     Quit date: 1/18/1976   • Smokeless tobacco: Never Used   • Alcohol use No   • Drug use: No   • Sexual activity: Not Currently       SURGICAL HISTORY   has a past surgical history that includes gyn surgery; carpal tunnel endoscopic (9/30/2014); abdominal hysterectomy total; other surgical procedure; other surgical procedure; nasal septal reconstruction; and other surgical procedure.    CURRENT MEDICATIONS  Reviewed.  See Encounter Summary.  Include   Current Facility-Administered Medications:     Current Outpatient Prescriptions:   •  meclizine (ANTIVERT) 25 MG Tab, Take 1 Tab by mouth 3 times a day as needed for Vertigo., Disp: 30 Tab, Rfl: 0  •  acetaminophen (TYLENOL) 325 MG Tab, Take 650 mg by mouth every four hours as needed., Disp: , Rfl:   •  PARoxetine Mesylate 7.5 MG Cap, TAKE ONE CAPSULE BY MOUTH AT BEDTIME, Disp: , Rfl: 3  •  atorvastatin (LIPITOR) 20 MG Tab, Take 1 Tab by mouth every evening., Disp: 30 Tab, Rfl: 11  •  metoprolol SR (TOPROL XL) 25 MG TABLET SR 24 HR, TAKE 1 TAB BY MOUTH EVERY DAY., Disp: 30 Tab, Rfl: 6  •  TURMERIC PO, Take  by mouth., Disp: , Rfl:   •  coenzyme Q-10 100 MG Cap capsule, Take 100 mg by mouth every day., Disp: , Rfl:   •  fluticasone (FLONASE) 50 MCG/ACT nasal spray, SPRAY 2 SPRAYS IN NOSE EVERY DAY., Disp: 1 Bottle, Rfl: 6  •  omeprazole (PRILOSEC) 40 MG delayed-release capsule, Take 40 mg by mouth., Disp: , Rfl: 5  •  Probiotic Product (PROBIOTIC COLON SUPPORT PO), Take  by mouth., Disp: , Rfl:   •  Multiple Vitamins-Minerals (MULTIVITAL PO), Take  by mouth., Disp: , Rfl:   •  Cholecalciferol (VITAMIN D3) 2000 UNIT Cap, Take 4,000 Units by mouth., Disp: , Rfl:   •  loratadine (CLARITIN) 10 MG TABS, Take 10 mg by mouth every day., Disp: , Rfl:       ALLERGIES  Allergies   Allergen Reactions   • Ciprofloxacin Hives     Okay to take cipro ear drops, no  "reaction with ear drop   • Nsaids      Pt has stomach issues when she takes anti-inflammatories.   • Other Environmental      seasonal   • Vicodin [Hydrocodone-Acetaminophen] Rash     Rash        PHYSICAL EXAM  VITAL SIGNS: BP (!) 195/99   Pulse 65   Temp 36.7 °C (98.1 °F)   Resp 18   Ht 1.6 m (5' 3\")   Wt 66.5 kg (146 lb 9.7 oz)   SpO2 97%   BMI 25.97 kg/m²   Constitutional:  Talkative, anxious , able to answer questions  HENT: Nose is normal in appearance, external ears are normal,  moist mucous membranes  Eyes: Anicteric,  pupils are equal round and reactive, there is no conjunctival drainage or pallor   Neck: The trachea is midline, there is no obvious mass or meningeal signs  Cardiovascular: Good perfusion,  regular rate and rhythm without murmurs gallops or rubs  Thorax & Lungs: Respiratory rate and effort are normal. There is normal chest excursion with respiration.  No wheezes rhonchi or rales noted.  Abdomen: Abdomen is normal in appearance, no gross peritoneal signs  normal bowel sounds, no pain with cough  :   No CVA tenderness to palpation  Musculoskeletal: No deformities noted in all 4 extremities.   Skin: Visualized skin is warm without rash.  Neurologic:  Cranial nerves II through XII are intact there is no focal abnormality noted.  Psychiatric: Normal mood and mentation    RADIOLOGY/PROCEDURES  Imaging Studies:   DX-CHEST-PORTABLE (1 VIEW)   Final Result         1. No acute cardiopulmonary abnormalities are identified.          Pertinent Labs   Results for orders placed or performed during the hospital encounter of 06/05/18   CBC with Differential   Result Value Ref Range    WBC 5.5 4.8 - 10.8 K/uL    RBC 4.60 4.20 - 5.40 M/uL    Hemoglobin 14.1 12.0 - 16.0 g/dL    Hematocrit 42.1 37.0 - 47.0 %    MCV 91.5 81.4 - 97.8 fL    MCH 30.7 27.0 - 33.0 pg    MCHC 33.5 (L) 33.6 - 35.0 g/dL    RDW 41.2 35.9 - 50.0 fL    Platelet Count 269 164 - 446 K/uL    MPV 9.9 9.0 - 12.9 fL    Neutrophils-Polys " 59.40 44.00 - 72.00 %    Lymphocytes 30.00 22.00 - 41.00 %    Monocytes 8.20 0.00 - 13.40 %    Eosinophils 1.50 0.00 - 6.90 %    Basophils 0.50 0.00 - 1.80 %    Immature Granulocytes 0.40 0.00 - 0.90 %    Nucleated RBC 0.00 /100 WBC    Neutrophils (Absolute) 3.25 2.00 - 7.15 K/uL    Lymphs (Absolute) 1.64 1.00 - 4.80 K/uL    Monos (Absolute) 0.45 0.00 - 0.85 K/uL    Eos (Absolute) 0.08 0.00 - 0.51 K/uL    Baso (Absolute) 0.03 0.00 - 0.12 K/uL    Immature Granulocytes (abs) 0.02 0.00 - 0.11 K/uL    NRBC (Absolute) 0.00 K/uL   Complete Metabolic Panel (CMP)   Result Value Ref Range    Sodium 136 135 - 145 mmol/L    Potassium 3.7 3.6 - 5.5 mmol/L    Chloride 104 96 - 112 mmol/L    Co2 26 20 - 33 mmol/L    Anion Gap 6.0 0.0 - 11.9    Glucose 98 65 - 99 mg/dL    Bun 15 8 - 22 mg/dL    Creatinine 0.92 0.50 - 1.40 mg/dL    Calcium 9.3 8.4 - 10.2 mg/dL    AST(SGOT) 27 12 - 45 U/L    ALT(SGPT) 34 2 - 50 U/L    Alkaline Phosphatase 64 30 - 99 U/L    Total Bilirubin 0.7 0.1 - 1.5 mg/dL    Albumin 4.3 3.2 - 4.9 g/dL    Total Protein 6.8 6.0 - 8.2 g/dL    Globulin 2.5 1.9 - 3.5 g/dL    A-G Ratio 1.7 g/dL   Btype Natriuretic Peptide (BNP)   Result Value Ref Range    B Natriuretic Peptide 14 0 - 100 pg/mL   Prothrombin Time (PT/INR)   Result Value Ref Range    PT 12.1 12.0 - 14.6 sec    INR 0.90 0.87 - 1.13   APTT   Result Value Ref Range    APTT 26.8 24.7 - 36.0 sec   Lipase   Result Value Ref Range    Lipase 27 7 - 58 U/L   Troponin STAT   Result Value Ref Range    Troponin I <0.02 0.00 - 0.04 ng/mL   ESTIMATED GFR   Result Value Ref Range    GFR If African American >60 >60 mL/min/1.73 m 2    GFR If Non African American >60 >60 mL/min/1.73 m 2   EKG (ER)   Result Value Ref Range    Report       [facility] Emergency Dept.    Test Date:  2018-06-05  Pt Name:    CLAY VALENTINE               Department: [facility]  MRN:        [id number]                      Room:       -ROOM 2  Gender:     Female                        Technician: [id number]  :        [id number]                   Requested By:DERRICK AMBROSIOY MCKOY  Order #:    [id number]                    Reading MD:    Measurements  Intervals                                Axis  Rate:       65                           P:          84  AZ:         149                          QRS:        50  QRSD:       73                           T:          10  QT:         405  QTc:        422    Interpretive Statements  Sinus rhythm  LAE, consider biatrial enlargement  Probable left ventricular hypertrophy  Compared to ECG 2014 04:50:31  No significant changes           I interpreted this EKG myself.  This is a 12-lead study.  The rhythm is sinus with a rate of 65.  There are nonospecific ST depression in inerior leads, can not rule out ishcemia, no ST segment elevation myocardial infarction..      COURSE & MEDICAL DECISION MAKING  Nursing notes and vital signs were reviewed. (See chart for details)  The patients nursing records were reviewed, history was obtained from the patient;     The patient presents with chest pain, and the differential diagnosis includes but is not limited to  acute coronary syndrome, anxiety disorder, aortic dissection, esophageal rupture or spasm, gastroesophageal reflux disease, musculoskeletal chest pain, acute myocardial infarction, peptic ulcer disease, pericarditis, pneumothorax, or pulmonary embolism.   I am most concerned about ACS as the patient has multiple risk factors     Initial orders in the Emergency Department included  CBC, CMP, troponin BNP, PCXR, TSH, T4 and initial treatment in the Emergency Department included Asa and NTG and the patient received IV  heplock    ED testing reveals negative tropoinin, repeat BP after NTG and /76 and patient is pain free    She will be admitted for BP management and stress test.        FINAL IMPRESSION  1. Chest Pain  2. Hypertension       DISPOSITION  Admit    Electronically signed by: Derrick Kim  Marlo, 6/5/2018 7:40 AM

## 2018-06-05 NOTE — H&P
Hospital Medicine History and Physical    Date of Service  6/5/2018    Chief Complaint  Chief Complaint   Patient presents with   • Irregular Heart Beat   • Chest Pressure   • Nausea       History of Presenting Illness  57 y.o. female with a history of hypertension and recently discontinued her home medications, history of seasonal allergies, GERD who presented 6/5/2018 with chest discomfort.     The patient states that about 2 months ago she was discontinued from her previously prescribed metoprolol as she was concerned about interactions with this and her albuterol inhaler. Her blood pressures while on metoprolol had been well-controlled therefore she was not placed on any additional medications. Over the last 2 months she has noted her pressures up and running more elevated however usually only in the 140-160 range. On the day of admission she developed blood pressure home and noted to be 190-200/100. This was associated with chest tightness. She had no associated nausea but did feel anxious and flushed. She had a mild sensation of palpitations however when she took her heart rate it was normal. She did not have any nausea, pain was centrally located and did not radiate. She has had no recent fever cough chills or leg swelling. She had no dizziness or loss of consciousness. The discomfort concerned her and therefore she came to the ER for evaluation.     ER course she was noted to have elevated blood pressure with systolic in the 190-200 range and diastolic over 100. She was given nitroglycerin and aspirin and her blood pressure did improve to the 140s. She will be admitted for telemetry monitoring and to rule out acute coronary syndrome by trending troponins and performing a stress test if these are negative. None  Primary Care Physician  Juany Ralph M.D.    Consultants  None    Code Status  Full    Review of Systems  Review of Systems   Constitutional: Negative for chills, fever and malaise/fatigue.    HENT: Negative for sore throat.    Respiratory: Negative for cough and shortness of breath.    Cardiovascular: Positive for chest pain. Negative for palpitations.   Gastrointestinal: Negative for abdominal pain, blood in stool, diarrhea, heartburn, nausea and vomiting.   Genitourinary: Negative for dysuria and frequency.   Musculoskeletal: Negative for back pain and myalgias.   Neurological: Negative for dizziness, focal weakness, weakness and headaches.   Psychiatric/Behavioral: Negative for depression and hallucinations. The patient is nervous/anxious.    All other systems reviewed and are negative.       Past Medical History  Past Medical History:   Diagnosis Date   • MRSA (methicillin resistant staph aureus) culture positive 2010   • Anesthesia     ponv   • Hypertension    • Indigestion    • Unspecified disorder of thyroid    • Unspecified urinary incontinence        Surgical History  Past Surgical History:   Procedure Laterality Date   • CARPAL TUNNEL ENDOSCOPIC  9/30/2014    Both side Performed by Edgar Leblanc M.D. at [facility] ORS   • ABDOMINAL HYSTERECTOMY TOTAL      Hysterectomy and removal Ovaries and tubes, Total Abdominal in 2000   • GYN SURGERY     • NASAL SEPTAL RECONSTRUCTION      2005   • OTHER SURGICAL PROCEDURE      Removal of polyps from urinary bladder and dilation of urethra in 2000   • OTHER SURGICAL PROCEDURE      bone graft surgery on left second toe in 2002 due to fracture   • OTHER SURGICAL PROCEDURE      I&D of left ear for ear infection twice in 2010 and 2011 by Dr Kahn, ENT       Medications  No current facility-administered medications on file prior to encounter.      Current Outpatient Prescriptions on File Prior to Encounter   Medication Sig Dispense Refill   • atorvastatin (LIPITOR) 20 MG Tab Take 1 Tab by mouth every evening. 30 Tab 11   • TURMERIC PO Take 1 Tab by mouth every day.     • fluticasone (FLONASE) 50 MCG/ACT nasal spray SPRAY 2 SPRAYS IN NOSE EVERY DAY.  1 Bottle 6   • omeprazole (PRILOSEC) 40 MG delayed-release capsule Take 40 mg by mouth every day.  5   • Probiotic Product (PROBIOTIC COLON SUPPORT PO) Take 1 Cap by mouth every day.     • Cholecalciferol (VITAMIN D3) 2000 UNIT Cap Take 4,000 Units by mouth every day.         Family History  Family History   Problem Relation Age of Onset   • Cancer Paternal Grandmother    • Cancer Paternal Aunt    • Other Mother      Parkinson disease   • Bipolar disorder Mother    • Hypertension Mother    • Thyroid Mother      Hypothyroid   • Heart Disease Father      Congenital heart disease   • Hypertension Father    • Diabetes Father    • Stroke Sister    • Bipolar disorder Sister    • Other Sister      alcoholic        Social History  Social History   Substance Use Topics   • Smoking status: Former Smoker     Quit date: 1976   • Smokeless tobacco: Never Used   • Alcohol use No       Allergies  Allergies   Allergen Reactions   • Ciprofloxacin Hives     Okay to take cipro ear drops, no reaction with ear drop   • Other Environmental      seasonal   • Vicodin [Hydrocodone-Acetaminophen] Rash     Rash         Physical Exam  Laboratory   Hemodynamics  Temp (24hrs), Av.6 °C (97.8 °F), Min:36.4 °C (97.6 °F), Max:36.7 °C (98.1 °F)   Temperature: 36.6 °C (97.8 °F)  Pulse  Av.2  Min: 62  Max: 75 Heart Rate (Monitored): 60  Blood Pressure: 128/68, NIBP: 143/67      Respiratory      Respiration: 18, Pulse Oximetry: 96 %             Physical Exam   Constitutional: She appears well-developed and well-nourished. No distress.   HENT:   Head: Normocephalic.   Mouth/Throat: No oropharyngeal exudate.   Eyes: Pupils are equal, round, and reactive to light. No scleral icterus.   Neck: Normal range of motion. Neck supple. No JVD present. No thyromegaly present.   Cardiovascular: Normal rate and regular rhythm.    No murmur heard.  Pulmonary/Chest: Effort normal and breath sounds normal. No respiratory distress. She has no wheezes.    Abdominal: Soft. Bowel sounds are normal. She exhibits no distension. There is no tenderness. There is no rebound.   Musculoskeletal: Normal range of motion. She exhibits no tenderness.   Neurological: She is alert. No cranial nerve deficit. Coordination normal.   Skin: Skin is warm and dry. No rash noted. No erythema.   Psychiatric: She has a normal mood and affect. Her behavior is normal.       Recent Labs      06/05/18   0727   WBC  5.5   RBC  4.60   HEMOGLOBIN  14.1   HEMATOCRIT  42.1   MCV  91.5   MCH  30.7   MCHC  33.5*   RDW  41.2   PLATELETCT  269   MPV  9.9     Recent Labs      06/05/18   0727   SODIUM  136   POTASSIUM  3.7   CHLORIDE  104   CO2  26   GLUCOSE  98   BUN  15   CREATININE  0.92   CALCIUM  9.3     Recent Labs      06/05/18   0727   ALTSGPT  34   ASTSGOT  27   ALKPHOSPHAT  64   TBILIRUBIN  0.7   LIPASE  27   GLUCOSE  98     Recent Labs      06/05/18   0727   APTT  26.8   INR  0.90     Recent Labs      06/05/18   0727   BNPBTYPENAT  14         Lab Results   Component Value Date    TROPONINI <0.02 06/05/2018     Urinalysis:  No results found for: SPECGRAVITY, GLUCOSEUR, KETONES, NITRITE, WBCURINE, RBCURINE, BACTERIA, EPITHELCELL     Imaging  Dx-chest-portable (1 View)  Result Date: 6/5/2018  1. No acute cardiopulmonary abnormalities are identified.     Assessment/Plan     I anticipate this patient is appropriate for observation status at this time.    * Chest pain- (present on admission)   Assessment & Plan    I suspect this is related to her elevated blood pressure as she was in the 200/100 range on admission.  Monitor oximetry, trend troponins.  Perform stress test if troponins are negative  Add lisinopril for blood pressure control  Check A1c and lipid panel        Pure hypercholesterolemia- (present on admission)   Assessment & Plan    Check lipid panel, continue atorvastatin 20        History of hypothyroidism- (present on admission)   Assessment & Plan    Continue Synthroid         Gastroesophageal reflux disease without esophagitis- (present on admission)   Assessment & Plan    Continue Prilosec  At GI cocktail when necessary  Avoid NSAIDs            VTE prophylaxis: Lovenox.

## 2018-06-05 NOTE — DISCHARGE INSTRUCTIONS
Discharge Instructions    Discharged to home by car with self. Discharged via wheelchair, hospital escort: Yes.  Special equipment needed: Not Applicable    Be sure to schedule a follow-up appointment with your primary care doctor or any specialists as instructed.     Discharge Plan: Follow up with your Primary care physician in 1 week (Need for blood pressure monitoring and possible need to add/change blood pressure medication)   Influenza Vaccine Indication: Not indicated: Previously immunized this influenza season and > 8 years of age    I understand that a diet low in cholesterol, fat, and sodium is recommended for good health. Unless I have been given specific instructions below for another diet, I accept this instruction as my diet prescription.   Other diet: Home Diet    Special Instructions: None    · Is patient discharged on Warfarin / Coumadin?   No     Depression / Suicide Risk    As you are discharged from this [facility] facility, it is important to learn how to keep safe from harming yourself.    Recognize the warning signs:  · Abrupt changes in personality, positive or negative- including increase in energy   · Giving away possessions  · Change in eating patterns- significant weight changes-  positive or negative  · Change in sleeping patterns- unable to sleep or sleeping all the time   · Unwillingness or inability to communicate  · Depression  · Unusual sadness, discouragement and loneliness  · Talk of wanting to die  · Neglect of personal appearance   · Rebelliousness- reckless behavior  · Withdrawal from people/activities they love  · Confusion- inability to concentrate     If you or a loved one observes any of these behaviors or has concerns about self-harm, here's what you can do:  · Talk about it- your feelings and reasons for harming yourself  · Remove any means that you might use to hurt yourself (examples: pills, rope, extension cords, firearm)  · Get professional help from the community  (Mental Health, Substance Abuse, psychological counseling)  · Do not be alone:Call your Safe Contact- someone whom you trust who will be there for you.  · Call your local CRISIS HOTLINE 944-0733 or 359-675-4582  · Call your local Children's Mobile Crisis Response Team [place] (209) 390-2603 or www.[employer]  · Call the toll free National Suicide Prevention Hotlines   · National Suicide Prevention Lifeline 799-895-IAVG (3304)  · National Hope Line Network 800-SUICIDE (073-8616)

## 2018-06-05 NOTE — ED NOTES
Pt c/o chest pressure, feels like her heart is pounding and nausea x4 days. Pt states that is starts with the heart pounding. States that there is nothing that triggers it. Pt states was on metoprolol for BP but was taken off bc it was conflicting with her allergy meds. Pt states BP at home has been high.

## 2018-06-05 NOTE — DISCHARGE SUMMARY
Discharge Summary    CHIEF COMPLAINT ON ADMISSION  Chief Complaint   Patient presents with   • Irregular Heart Beat   • Chest Pressure   • Nausea       Reason for Admission  Racing heart rate.      Admission Date  6/5/2018    CODE STATUS  Full Code    HPI & HOSPITAL COURSE  This is a 57 y.o. female with a history of seasonal allergies and hypertension recently removed from blood pressure medications, also history of GERD here with chest tightness and racing heart. She is noted to have hypertensive urgency and blood pressure improved with nitroglycerin and aspirin. Her symptoms also improved with blood pressure control. She was admitted to rule out acute coronary syndrome and had troponins that were trended and remained negative. She had no events on telemetry. She underwent a cardiac stress test that showed no evidence of reversible ischemia. As mentioned above, her symptoms improved as her blood pressure was controlled. Lisinopril was added 20 mg oral daily. She is continued on her home dose of statin. A baby aspirin was also added. She has previously been prescribed metoprolol the past however there was concern for interaction with this and her inhaler which she takes for albuterol therefore, beta blockers will be avoided at this time. Her blood pressures came down to the 140-150 range with lisinopril. She was counseled that she may require a 2nd medication for ideal blood pressure control of less than 140. 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    She was discharged with a prescription for lisinopril. She will follow closely with her primary care physician to have her blood pressure monitored however she will also take her blood pressure daily at home.    Of note: D-dimer was negative therefore the possibility of PE was  very unlikely. She was provided as needed GI cocktail however overall, her GERD symptoms remained well controlled. She was continued on her home omeprazole.       Therefore, she is discharged in good and stable condition to home with close outpatient follow-up.    The patient recovered much more quickly than anticipated on admission.    Discharge Date  6/5/18    FOLLOW UP ITEMS POST DISCHARGE  F/U with PCP to have BP repeated    DISCHARGE DIAGNOSES  Principal Problem:    Chest pain POA: Yes  Active Problems:    Gastroesophageal reflux disease without esophagitis POA: Yes      Overview: EGD on 10/16/14 showed non bleeding esophageal ulcer. Biopsy ruled out       eosinophilic esophagitis, Dewitt's esophagus, celiac disease and H       pylori.      Follow with digestive health.    History of hypothyroidism POA: Yes    Pure hypercholesterolemia POA: Yes  Resolved Problems:    * No resolved hospital problems. *      FOLLOW UP  Future Appointments  Date Time Provider Department Center   7/26/2018 3:00 PM Juany Ralph M.D. [facility]     No follow-up provider specified.    MEDICATIONS ON DISCHARGE     Medication List      START taking these medications      Instructions   aspirin 81 MG EC tablet  Start taking on:  6/6/2018   Take 1 Tab by mouth every day.  Dose:  81 mg     lisinopril 20 MG Tabs  Start taking on:  6/6/2018  Commonly known as:  PRINIVIL   Take 1 Tab by mouth every day.  Dose:  20 mg        CONTINUE taking these medications      Instructions   atorvastatin 20 MG Tabs  Commonly known as:  LIPITOR   Take 1 Tab by mouth every evening.  Dose:  20 mg     fluticasone 50 MCG/ACT nasal spray  Commonly known as:  FLONASE   SPRAY 2 SPRAYS IN NOSE EVERY DAY.     omeprazole 40 MG delayed-release capsule  Commonly known as:  PRILOSEC   Take 40 mg by mouth every day.  Dose:  40 mg     PROBIOTIC COLON SUPPORT PO   Take 1 Cap by mouth every day.  Dose:  1 Cap     TURMERIC PO   Take 1 Tab by mouth every day.  Dose:  1  Tab     Vitamin D3 2000 UNIT Caps   Take 4,000 Units by mouth every day.  Dose:  4000 Units            Allergies  Allergies   Allergen Reactions   • Ciprofloxacin Hives     Okay to take cipro ear drops, no reaction with ear drop   • Other Environmental      seasonal   • Vicodin [Hydrocodone-Acetaminophen] Rash     Rash        DIET  Orders Placed This Encounter   Procedures   • Protocol 1313 Diet Order: Reg, No Decaf, No Caffeine - Cardiac Stress Test Pharmacological     Standing Status:   Standing     Number of Occurrences:   1     Order Specific Question:   Diet:     Answer:   Cardiac [6]     Order Specific Question:   Miscellaneous modifications:     Answer:   No Decaf, No Caffeine(for test) [11]     Comments:   Protocol 1313 Patient to have no caffeine for 12 hours prior to exam (decaf, coffee, cola, tea, chocolate)       ACTIVITY  As tolerated.  Weight bearing as tolerated    CONSULTATIONS  None    PROCEDURES  None    LABORATORY  Lab Results   Component Value Date    SODIUM 136 06/05/2018    POTASSIUM 3.7 06/05/2018    CHLORIDE 104 06/05/2018    CO2 26 06/05/2018    GLUCOSE 98 06/05/2018    BUN 15 06/05/2018    CREATININE 0.92 06/05/2018        Lab Results   Component Value Date    WBC 5.5 06/05/2018    HEMOGLOBIN 14.1 06/05/2018    HEMATOCRIT 42.1 06/05/2018    PLATELETCT 269 06/05/2018        Total time of the discharge process exceeds 36 minutes.

## 2018-06-06 ENCOUNTER — PATIENT OUTREACH (OUTPATIENT)
Dept: HEALTH INFORMATION MANAGEMENT | Facility: OTHER | Age: 58
End: 2018-06-06

## 2018-06-07 ENCOUNTER — OFFICE VISIT (OUTPATIENT)
Dept: MEDICAL GROUP | Facility: CLINIC | Age: 58
End: 2018-06-07
Payer: COMMERCIAL

## 2018-06-07 VITALS
TEMPERATURE: 98.3 F | HEART RATE: 72 BPM | HEIGHT: 63 IN | RESPIRATION RATE: 16 BRPM | BODY MASS INDEX: 26.4 KG/M2 | OXYGEN SATURATION: 95 % | SYSTOLIC BLOOD PRESSURE: 114 MMHG | WEIGHT: 149 LBS | DIASTOLIC BLOOD PRESSURE: 60 MMHG

## 2018-06-07 DIAGNOSIS — J30.2 SEASONAL ALLERGIC RHINITIS, UNSPECIFIED TRIGGER: ICD-10-CM

## 2018-06-07 DIAGNOSIS — I10 ESSENTIAL HYPERTENSION: ICD-10-CM

## 2018-06-07 DIAGNOSIS — Z09 HOSPITAL DISCHARGE FOLLOW-UP: ICD-10-CM

## 2018-06-07 PROBLEM — I16.0 HYPERTENSIVE URGENCY: Status: RESOLVED | Noted: 2018-06-05 | Resolved: 2018-06-07

## 2018-06-07 PROCEDURE — 99214 OFFICE O/P EST MOD 30 MIN: CPT | Performed by: NURSE PRACTITIONER

## 2018-06-07 RX ORDER — ALBUTEROL SULFATE 90 UG/1
2 AEROSOL, METERED RESPIRATORY (INHALATION) EVERY 6 HOURS PRN
COMMUNITY
End: 2022-01-24

## 2018-06-07 ASSESSMENT — ENCOUNTER SYMPTOMS
DIZZINESS: 0
COUGH: 0
CHILLS: 0
DEPRESSION: 0
WHEEZING: 0
NERVOUS/ANXIOUS: 0
FALLS: 0
MYALGIAS: 0
HEARTBURN: 0
FEVER: 0
WEAKNESS: 0
ABDOMINAL PAIN: 0
VOMITING: 0
PALPITATIONS: 0
NAUSEA: 0
HEADACHES: 0
SPUTUM PRODUCTION: 0
SHORTNESS OF BREATH: 1
FOCAL WEAKNESS: 0

## 2018-06-07 NOTE — PROGRESS NOTES
Subjective:     Danii Rodriguez is a 57 y.o. female who presents for Hospital Follow-up.  Chart reviewed. Discharge summary available for review: Yes   Date of discharge 6/5/2018.  48- hour post discharge RN call completed on 6/6/2018 and documented in the medical record by  Melanie Hadley.    HPI: Recently hospitalized for irregular heart beat, chest pressure, nausea, found hypertensive urgency, hx of HTN with discontinuation of BP med lately, symptoms improved with BP medications, lisinopril 20 mg daily.    Dyslipidemia, , on lipitor 20 mg hs.  Stress test no evidence of reversible ischemia.   D-dimer < 200, TSH 3.34    Since returning home, patient reports he is feeling ok but she still feels tired and sometimes feeling unable to catch her breath and wondering if that is related to her BP medication again.     She reports she is allergic to lots of thing from skin patch test so that she is receiving allergy shot periodically to increase her tolerance. It sounds like desensitization therapy but she is not sure the name. She calls them allergy shot all the time. She reports her allergy specialist is Dr. Peterson and there is conflict between allergy shot therapy and her BP medication in the past, BB. Because her BP has been maintained around 120-130 so that she and her PCP decided to take her off BB but then she has hypertensive urgency with /99 at ER.     She reports taking medications as instructed now, on lisinopril. She is willing to be a little bit patient with this medication to see how this medication works for her even though she still chest pressure, sob once a while. She reports when she was off BP medication, she was doing so great with all the symptoms gone.     She has contacted allergy specialist for follow up and will let him know about her new BP med.     The patient denied weakness; no difficulty taking care of self at home.    PCP Follow up on 7/16. Pt requests to reschedule.      Patient Active Problem List    Diagnosis Date Noted   • Primary insomnia 01/25/2018   • Hot flashes, menopausal 01/25/2018   • Vitamin D insufficiency 10/25/2017   • Chronic fatigue 09/19/2017   • Muscle cramp 09/19/2017   • Essential hypertension 09/19/2017   • Muscle spasms of neck 03/23/2017   • Temporal pain 10/17/2016   • Left elbow pain 09/30/2016   • Ulcer of esophagus without bleeding 09/30/2016   • Pressure sensation in left ear, left temporal area and left TM joint 01/18/2016   • History of hypothyroidism 01/18/2016   • Pure hypercholesterolemia 01/18/2016   • History of carpal tunnel repair 09/30/2014   • Chest pain 02/13/2014   • Seasonal allergies 02/13/2014   • Gastroesophageal reflux disease without esophagitis 02/13/2014         Allergies:   Ciprofloxacin; Other environmental; and Vicodin [hydrocodone-acetaminophen]    Social History:  Social History   Substance Use Topics   • Smoking status: Former Smoker     Quit date: 1/18/1976   • Smokeless tobacco: Never Used   • Alcohol use No        ROS:  Review of Systems   Constitutional: Negative for chills, fever and malaise/fatigue.   Respiratory: Positive for shortness of breath (ONCE A WHILE, COMING AND GOING. She thinks it could be related to her BP med again. ). Negative for cough, sputum production and wheezing.    Cardiovascular: Negative for chest pain ( pressure on esophageal area but denied heartburn and does not think it is GI related), palpitations and leg swelling.   Gastrointestinal: Negative for abdominal pain, heartburn, nausea and vomiting.   Genitourinary: Negative for dysuria, frequency and urgency.   Musculoskeletal: Negative for falls and myalgias.   Skin: Negative for rash.   Neurological: Negative for dizziness, focal weakness, weakness and headaches.   Psychiatric/Behavioral: Negative for depression. The patient is not nervous/anxious.         Objective:     Blood pressure 114/60, pulse 72, temperature 36.8 °C (98.3 °F), resp.  "rate 16, height 1.6 m (5' 3\"), weight 67.6 kg (149 lb), SpO2 95 %.     Physical Exam:  Physical Exam   Constitutional: She is oriented to person, place, and time and well-developed, well-nourished, and in no distress.   HENT:   Head: Normocephalic and atraumatic.   Eyes: Conjunctivae are normal.   Neck: Neck supple. No JVD present. No thyromegaly present.   Cardiovascular: Normal rate and regular rhythm.    No murmur heard.  Pulmonary/Chest: Effort normal and breath sounds normal. No respiratory distress. She has no wheezes.   Abdominal: Soft. Bowel sounds are normal. She exhibits no distension. There is no tenderness.   Musculoskeletal: Normal range of motion. She exhibits no edema.   Neurological: She is alert and oriented to person, place, and time.   Skin: Skin is warm. No erythema.   Nursing note and vitals reviewed.        Assessment and Plan:     1. Hospital discharge follow-up  Hospitalization and results reviewed with patient. High risk conditions requiring teaching or care coordination were identified and addressed.The patient demonstrate understanding of admission and underlying conditions. The patient understands discharge instructions and when to seek medical attention. Medications reviewed including instructions regarding high risk medications, dosing and side effects.    The patient is able to safely adhere to ADL/IADL, treatment and medication regimen, self-manage of high-risk conditions? Yes   The patient requires physical therapy/home health/DME referral? No   The patient requires referral to care coordination/behavioral health/social work?  No   Patient requires referral for pharmacy consult? No   Advance directive/POLST on file?  No   Required counseled on advance directive?  No     Rescheduled to follow up with PCP on 6/27 per request. BP log form is provided to her. She is instructed to bring her BP log to PCP. She verbalized understanding.     2. Seasonal allergic rhinitis, unspecified " trigger  - albuterol as needed, receiving desensitization therapy. Will request medical record from her allergy specialist for future reference and to assist on her treatment plan for her BP control. She agrees.   - Obtain Results: Other (see comment) (Most recent two office visit note); Obtain Results From: Other (see comment) (allergy specialist Dr. Peterson)    3. Essential hypertension  Continue lisinopril 20 mg daily. BP controlled well now.       Medication Reconciliation  Medication list at end of encounter:   Current Outpatient Prescriptions   Medication Sig Dispense Refill   • albuterol 108 (90 Base) MCG/ACT Aero Soln inhalation aerosol Inhale 2 Puffs by mouth every 6 hours as needed for Shortness of Breath.     • aspirin EC 81 MG EC tablet Take 1 Tab by mouth every day. 30 Tab    • lisinopril (PRINIVIL) 20 MG Tab Take 1 Tab by mouth every day. 30 Tab 2   • atorvastatin (LIPITOR) 20 MG Tab Take 1 Tab by mouth every evening. 30 Tab 11   • TURMERIC PO Take 1 Tab by mouth every day.     • omeprazole (PRILOSEC) 40 MG delayed-release capsule Take 40 mg by mouth every day.  5   • Probiotic Product (PROBIOTIC COLON SUPPORT PO) Take 1 Cap by mouth every day.     • Cholecalciferol (VITAMIN D3) 2000 UNIT Cap Take 4,000 Units by mouth every day.     • fluticasone (FLONASE) 50 MCG/ACT nasal spray SPRAY 2 SPRAYS IN NOSE EVERY DAY. 1 Bottle 6     No current facility-administered medications for this visit.        Primary care follow-up:  New health conditions identified during hospitalization? Yes   Labs/pathology/imaging requires future PCP follow-up?  No   Changes to medications during hospitalization or today? Yes during hospitalization    Recommended followup: No Follow-up on file. with Juany Ralph M.D.   Future Appointments       Provider Department [place]    6/7/2018 9:20 AM [facility]    7/26/2018 3:00 PM Juany Ralph M.D. [place] [address] [address]           Patient Instruction  Patient offered educational material on discharge diagnosis and management of symptoms/red flags. Patient instructed to keep follow-up appointments and to bring written questions and and actual medications to each office visit. Patient instructed to call PCP/specialist with any problems/questions/concerns. Patient verbalizes understanding and has no further questions at this time.    Face-to-face transitional care management services with moderate complexity medical decision making.

## 2018-06-07 NOTE — PATIENT INSTRUCTIONS
If you need further assistance, or have any questions; concerns or lingering symptoms before seeing your Primary Care Provider or specialist.     Do not hesitate to contact us.     Please contact us at the Post-Hospital Follow Up Program at [phone].   Our offices hours are Monday-Friday 8 am-5 pm.

## 2018-06-07 NOTE — LETTER
Count includes the [facility]  Juany Ralph M.D.  [address]  [place] [address]  Fax: [phone]   Authorization for Release/Disclosure of   Protected Health Information   Name: DANII VALENTINE : 1960 SSN: [id number]   Address: [address] [place] [id number] Phone:    [phone] (home)    I authorize the entity listed below to release/disclose the PHI below to:   Count includes the [facility]/Juany Ralph M.D. and [facility]   Provider or Entity Name: Toby Berry MD      Address [place], [facility], Zip  [address] [place] [address]      Phone:  [phone]    Fax:     Reason for request: continuity of care   Information to be released:    [  ] LAST COLONOSCOPY,  including any PATH REPORT and follow-up  [  ] LAST FIT/COLOGUARD RESULT [  ] LAST DEXA  [  ] LAST MAMMOGRAM  [  ] LAST PAP  [  ] LAST LABS [  ] RETINA EXAM REPORT  [  ] IMMUNIZATION RECORDS  [ X] Release all info      [  ] Check here and initial the line next to each item to release ALL health information INCLUDING  _____ Care and treatment for drug and / or alcohol abuse  _____ HIV testing, infection status, or AIDS  _____ Genetic Testing    DATES OF SERVICE OR TIME PERIOD TO BE DISCLOSED:  thru   I understand and acknowledge that:  * This Authorization may be revoked at any time by you in writing, except if your health information has already been used or disclosed.  * Your health information that will be used or disclosed as a result of you signing this authorization could be re-disclosed by the recipient. If this occurs, your re-disclosed health information may no longer be protected by State or Federal laws.  * You may refuse to sign this Authorization. Your refusal will not affect your ability to obtain treatment.  * This Authorization becomes effective upon signing and will  on (date) __________.      If no date is indicated, this Authorization will  one (1) year from the signature date.    Name: Danii Ramos  Michael    Signature:   Date:     6/7/2018       PLEASE FAX REQUESTED RECORDS BACK TO: [phone]

## 2018-06-07 NOTE — PROGRESS NOTES
POST DISCHARGE CALL MADE BY Melanie Hadley  Discharge Date:6/5/2018   Date of Outreach Call: 6/6/2018  2:41 PM  Now that you're home, how are you doing? Good  Comment:Pt reports she is feeling much better. States  B/P reading last night was 104/54, this am 133/67  Do you have questions about your medications? No    Did you fill your medications? Yes    Do you have a follow-up appointment scheduled?Yes  Comment:Post discharge clinic on 6/7/18    Discharging Department: [facility]    Number of Attempts: 1  Current or previous attempts completed within two business days of discharge? Yes  Provided education regarding treatment plan, medication, self-management, ADLs? Yes  Has patient completed Advance Directive? If yes, advise them to bring to appointment. No  Care Manager phone number provided? Yes  Is there anything else I can help you with? No

## 2018-06-09 DIAGNOSIS — I10 ESSENTIAL HYPERTENSION: ICD-10-CM

## 2018-06-11 RX ORDER — METOPROLOL SUCCINATE 25 MG/1
25 TABLET, EXTENDED RELEASE ORAL DAILY
Qty: 90 TAB | Refills: 3 | Status: SHIPPED | OUTPATIENT
Start: 2018-06-11 | End: 2018-06-27

## 2018-06-11 NOTE — TELEPHONE ENCOUNTER
Was the patient seen in the last year in this department? Yes    Does patient have an active prescription for medications requested? No    Received Request Via: Pharmacy

## 2018-06-24 ENCOUNTER — PATIENT MESSAGE (OUTPATIENT)
Dept: MEDICAL GROUP | Age: 58
End: 2018-06-24

## 2018-06-25 NOTE — TELEPHONE ENCOUNTER
From: Danii Rodriguez  To: Juany Ralph M.D.  Sent: 6/24/2018 9:29 AM PDT  Subject: Test Result Question    I have an appointment to see Dr Ralph on the 27th, I will need my lab work request, for Lipid and vitamin d , the rest has been completed. I will have it drawn early Tuesday am.     I have misplaced the lab paperwork.    Danii Rodriguez

## 2018-06-25 NOTE — TELEPHONE ENCOUNTER
Phone Number Called: [phone] (home)       Message: left a detailed voice mail to pt regarding Dr. Vela note    Left Message for patient to call back: N\A

## 2018-06-25 NOTE — TELEPHONE ENCOUNTER
Please call to inform patient that the blood tests for vitamin D and lipid panel were order on 1/25/18.  Please advise patient that [facility] lab can see the order for vitamin D and Lipid.    Thanks!    Juany Ralph M.D.

## 2018-06-26 ENCOUNTER — HOSPITAL ENCOUNTER (OUTPATIENT)
Dept: LAB | Facility: MEDICAL CENTER | Age: 58
End: 2018-06-26
Attending: INTERNAL MEDICINE
Payer: COMMERCIAL

## 2018-06-26 DIAGNOSIS — E78.00 PURE HYPERCHOLESTEROLEMIA: ICD-10-CM

## 2018-06-26 DIAGNOSIS — F51.01 PRIMARY INSOMNIA: ICD-10-CM

## 2018-06-26 DIAGNOSIS — E55.9 VITAMIN D INSUFFICIENCY: ICD-10-CM

## 2018-06-26 DIAGNOSIS — I10 ESSENTIAL HYPERTENSION: ICD-10-CM

## 2018-06-26 LAB
25(OH)D3 SERPL-MCNC: 45 NG/ML (ref 30–100)
ALBUMIN SERPL BCP-MCNC: 4.9 G/DL (ref 3.2–4.9)
ALBUMIN/GLOB SERPL: 1.7 G/DL
ALP SERPL-CCNC: 77 U/L (ref 30–99)
ALT SERPL-CCNC: 24 U/L (ref 2–50)
ANION GAP SERPL CALC-SCNC: 9 MMOL/L (ref 0–11.9)
AST SERPL-CCNC: 22 U/L (ref 12–45)
BASOPHILS # BLD AUTO: 0.3 % (ref 0–1.8)
BASOPHILS # BLD: 0.02 K/UL (ref 0–0.12)
BILIRUB SERPL-MCNC: 0.7 MG/DL (ref 0.1–1.5)
BUN SERPL-MCNC: 18 MG/DL (ref 8–22)
CALCIUM SERPL-MCNC: 9.8 MG/DL (ref 8.5–10.5)
CHLORIDE SERPL-SCNC: 103 MMOL/L (ref 96–112)
CHOLEST SERPL-MCNC: 235 MG/DL (ref 100–199)
CO2 SERPL-SCNC: 27 MMOL/L (ref 20–33)
CREAT SERPL-MCNC: 0.87 MG/DL (ref 0.5–1.4)
EOSINOPHIL # BLD AUTO: 0.04 K/UL (ref 0–0.51)
EOSINOPHIL NFR BLD: 0.6 % (ref 0–6.9)
ERYTHROCYTE [DISTWIDTH] IN BLOOD BY AUTOMATED COUNT: 41.9 FL (ref 35.9–50)
GLOBULIN SER CALC-MCNC: 2.9 G/DL (ref 1.9–3.5)
GLUCOSE SERPL-MCNC: 101 MG/DL (ref 65–99)
HCT VFR BLD AUTO: 42.7 % (ref 37–47)
HDLC SERPL-MCNC: 64 MG/DL
HGB BLD-MCNC: 14.2 G/DL (ref 12–16)
IMM GRANULOCYTES # BLD AUTO: 0.01 K/UL (ref 0–0.11)
IMM GRANULOCYTES NFR BLD AUTO: 0.2 % (ref 0–0.9)
LDLC SERPL CALC-MCNC: 144 MG/DL
LYMPHOCYTES # BLD AUTO: 1.61 K/UL (ref 1–4.8)
LYMPHOCYTES NFR BLD: 25.1 % (ref 22–41)
MCH RBC QN AUTO: 30.2 PG (ref 27–33)
MCHC RBC AUTO-ENTMCNC: 33.3 G/DL (ref 33.6–35)
MCV RBC AUTO: 90.9 FL (ref 81.4–97.8)
MONOCYTES # BLD AUTO: 0.42 K/UL (ref 0–0.85)
MONOCYTES NFR BLD AUTO: 6.5 % (ref 0–13.4)
NEUTROPHILS # BLD AUTO: 4.32 K/UL (ref 2–7.15)
NEUTROPHILS NFR BLD: 67.3 % (ref 44–72)
NRBC # BLD AUTO: 0 K/UL
NRBC BLD-RTO: 0 /100 WBC
PLATELET # BLD AUTO: 288 K/UL (ref 164–446)
PMV BLD AUTO: 11.3 FL (ref 9–12.9)
POTASSIUM SERPL-SCNC: 4.6 MMOL/L (ref 3.6–5.5)
PROT SERPL-MCNC: 7.8 G/DL (ref 6–8.2)
RBC # BLD AUTO: 4.7 M/UL (ref 4.2–5.4)
SODIUM SERPL-SCNC: 139 MMOL/L (ref 135–145)
TRIGL SERPL-MCNC: 133 MG/DL (ref 0–149)
WBC # BLD AUTO: 6.4 K/UL (ref 4.8–10.8)

## 2018-06-26 PROCEDURE — 82306 VITAMIN D 25 HYDROXY: CPT

## 2018-06-26 PROCEDURE — 80053 COMPREHEN METABOLIC PANEL: CPT

## 2018-06-26 PROCEDURE — 80061 LIPID PANEL: CPT

## 2018-06-26 PROCEDURE — 85025 COMPLETE CBC W/AUTO DIFF WBC: CPT

## 2018-06-26 PROCEDURE — 36415 COLL VENOUS BLD VENIPUNCTURE: CPT

## 2018-06-27 ENCOUNTER — OFFICE VISIT (OUTPATIENT)
Dept: MEDICAL GROUP | Age: 58
End: 2018-06-27
Payer: COMMERCIAL

## 2018-06-27 VITALS
WEIGHT: 146 LBS | HEART RATE: 72 BPM | TEMPERATURE: 98 F | BODY MASS INDEX: 25.87 KG/M2 | DIASTOLIC BLOOD PRESSURE: 62 MMHG | HEIGHT: 63 IN | OXYGEN SATURATION: 96 % | SYSTOLIC BLOOD PRESSURE: 114 MMHG

## 2018-06-27 DIAGNOSIS — R53.82 CHRONIC FATIGUE: ICD-10-CM

## 2018-06-27 DIAGNOSIS — E55.9 VITAMIN D INSUFFICIENCY: ICD-10-CM

## 2018-06-27 DIAGNOSIS — E78.00 PURE HYPERCHOLESTEROLEMIA: ICD-10-CM

## 2018-06-27 DIAGNOSIS — R73.01 IFG (IMPAIRED FASTING GLUCOSE): ICD-10-CM

## 2018-06-27 DIAGNOSIS — I10 ESSENTIAL HYPERTENSION: ICD-10-CM

## 2018-06-27 PROCEDURE — 99214 OFFICE O/P EST MOD 30 MIN: CPT | Performed by: INTERNAL MEDICINE

## 2018-06-27 RX ORDER — ROSUVASTATIN CALCIUM 10 MG/1
10 TABLET, COATED ORAL EVERY EVENING
Qty: 30 TAB | Refills: 11 | Status: SHIPPED | OUTPATIENT
Start: 2018-06-27 | End: 2018-08-23

## 2018-06-27 ASSESSMENT — PATIENT HEALTH QUESTIONNAIRE - PHQ9: CLINICAL INTERPRETATION OF PHQ2 SCORE: 0

## 2018-06-27 ASSESSMENT — PAIN SCALES - GENERAL: PAINLEVEL: 7=MODERATE-SEVERE PAIN

## 2018-06-27 NOTE — ASSESSMENT & PLAN NOTE
Patient reported feeling tired and fatigued.  She has hypertensive urgency after stopping metoprolol.  Patient did not want to take beta-blocker as she could not receive allergy shots when she was taking beta-blocker.  Patient states that her blood pressure has been stable with lisinopril 20 mg daily.  She has very mild cough from lisinopril but she states that she is tolerating well and she does not want to switch to other blood pressure medication yet.  Her vitamin D level was normal at 45 on 6/26/18.  She has normal thyroid hormone on 6/5/18.  Her CBC and CMP on 6/5/18 and 6/26/18 are generally normal except slightly high fasting blood sugar at 101.

## 2018-06-27 NOTE — PATIENT INSTRUCTIONS
Fat and Cholesterol Restricted Diet  High levels of fat and cholesterol in your blood may lead to various health problems, such as diseases of the heart, blood vessels, gallbladder, liver, and pancreas. Fats are concentrated sources of energy that come in various forms. Certain types of fat, including saturated fat, may be harmful in excess. Cholesterol is a substance needed by your body in small amounts. Your body makes all the cholesterol it needs. Excess cholesterol comes from the food you eat.  When you have high levels of cholesterol and saturated fat in your blood, health problems can develop because the excess fat and cholesterol will gather along the walls of your blood vessels, causing them to narrow. Choosing the right foods will help you control your intake of fat and cholesterol. This will help keep the levels of these substances in your blood within normal limits and reduce your risk of disease.  WHAT IS MY PLAN?  Your health care provider recommends that you:  · Get no more than __________ % of the total calories in your daily diet from fat.  · Limit your intake of saturated fat to less than ______% of your total calories each day.  · Limit the amount of cholesterol in your diet to less than _________mg per day.  WHAT TYPES OF FAT SHOULD I CHOOSE?  · Choose healthy fats more often. Choose monounsaturated and polyunsaturated fats, such as olive and canola oil, flaxseeds, walnuts, almonds, and seeds.  · Eat more omega-3 fats. Good choices include salmon, mackerel, sardines, tuna, flaxseed oil, and ground flaxseeds. Aim to eat fish at least two times a week.  · Limit saturated fats. Saturated fats are primarily found in animal products, such as meats, butter, and cream. Plant sources of saturated fats include palm oil, palm kernel oil, and coconut oil.  · Avoid foods with partially hydrogenated oils in them. These contain trans fats. Examples of foods that contain trans fats are stick margarine, some tub  "margarines, cookies, crackers, and other baked goods.  WHAT GENERAL GUIDELINES DO I NEED TO FOLLOW?  These guidelines for healthy eating will help you control your intake of fat and cholesterol:  · Check food labels carefully to identify foods with trans fats or high amounts of saturated fat.  · Fill one half of your plate with vegetables and green salads.  · Fill one fourth of your plate with whole grains. Look for the word \"whole\" as the first word in the ingredient list.  · Fill one fourth of your plate with lean protein foods.  · Limit fruit to two servings a day. Choose fruit instead of juice.  · Eat more foods that contain soluble fiber. Examples of foods that contain this type of fiber are apples, broccoli, carrots, beans, peas, and barley. Aim to get 20-30 g of fiber per day.  · Eat more home-cooked food and less restaurant, buffet, and fast food.  · Limit or avoid alcohol.  · Limit foods high in starch and sugar.  · Limit fried foods.  · Cook foods using methods other than frying. Baking, boiling, grilling, and broiling are all great options.  · Lose weight if you are overweight. Losing just 5-10% of your initial body weight can help your overall health and prevent diseases such as diabetes and heart disease.  WHAT FOODS CAN I EAT?  Grains  Whole grains, such as whole wheat or whole grain breads, crackers, cereals, and pasta. Unsweetened oatmeal, bulgur, barley, quinoa, or brown rice. Corn or whole wheat flour tortillas.  Vegetables  Fresh or frozen vegetables (raw, steamed, roasted, or grilled). Green salads.  Fruits  All fresh, canned (in natural juice), or frozen fruits.  Meat and Other Protein Products  Ground beef (85% or leaner), grass-fed beef, or beef trimmed of fat. Skinless chicken or turkey. Ground chicken or turkey. Pork trimmed of fat. All fish and seafood. Eggs. Dried beans, peas, or lentils. Unsalted nuts or seeds. Unsalted canned or dry beans.  Dairy  Low-fat dairy products, such as skim or " 1% milk, 2% or reduced-fat cheeses, low-fat ricotta or cottage cheese, or plain low-fat yogurt.  Fats and Oils  Tub margarines without trans fats. Light or reduced-fat mayonnaise and salad dressings. Avocado. Olive, canola, sesame, or safflower oils. Natural peanut or almond butter (choose ones without added sugar and oil).  The items listed above may not be a complete list of recommended foods or beverages. Contact your dietitian for more options.  WHAT FOODS ARE NOT RECOMMENDED?  Grains  White bread. White pasta. White rice. Cornbread. Bagels, pastries, and croissants. Crackers that contain trans fat.  Vegetables  White potatoes. Corn. Creamed or fried vegetables. Vegetables in a cheese sauce.  Fruits  Dried fruits. Canned fruit in light or heavy syrup. Fruit juice.  Meat and Other Protein Products  Fatty cuts of meat. Ribs, chicken wings, menendez, sausage, bologna, salami, chitterlings, fatback, hot dogs, bratwurst, and packaged luncheon meats. Liver and organ meats.  Dairy  Whole or 2% milk, cream, half-and-half, and cream cheese. Whole milk cheeses. Whole-fat or sweetened yogurt. Full-fat cheeses. Nondairy creamers and whipped toppings. Processed cheese, cheese spreads, or cheese curds.  Sweets and Desserts  Corn syrup, sugars, honey, and molasses. Candy. Jam and jelly. Syrup. Sweetened cereals. Cookies, pies, cakes, donuts, muffins, and ice cream.  Fats and Oils  Butter, stick margarine, lard, shortening, ghee, or menendez fat. Coconut, palm kernel, or palm oils.  Beverages  Alcohol. Sweetened drinks (such as sodas, lemonade, and fruit drinks or punches).  The items listed above may not be a complete list of foods and beverages to avoid. Contact your dietitian for more information.     This information is not intended to replace advice given to you by your health care provider. Make sure you discuss any questions you have with your health care provider.     Document Released: 12/18/2006 Document Revised: 01/08/2016  Document Reviewed: 03/18/2015  [employer] Interactive Patient Education ©2016 [employer]      Preventing High Cholesterol  Cholesterol is a waxy, fat-like substance that your body needs in small amounts. Your liver makes all the cholesterol that your body needs. Having high cholesterol (hypercholesterolemia) increases your risk for heart disease and stroke. Extra (excess) cholesterol comes from the food you eat, such as animal-based fat (saturated fat) from meat and some dairy products.  High cholesterol can often be prevented with diet and lifestyle changes. If you already have high cholesterol, you can control it with diet and lifestyle changes, as well as medicine.  What nutrition changes can be made?  · Eat less saturated fat. Foods that contain saturated fat include red meat and some dairy products.  · Avoid processed meats, like menendez and lunch meats.  · Avoid trans fats, which are found in margarine and some baked goods.  · Avoid foods and beverages that have added sugars.  · Eat more fruits, vegetables, and whole grains.  · Choose healthy sources of protein, such as fish, poultry, and nuts.  · Choose healthy sources of fat, such as:  ¨ Nuts.  ¨ Vegetable oils, especially olive oil.  ¨ Fish that have healthy fats (omega-3 fatty acids), such as mackerel or salmon.  What lifestyle changes can be made?  · Lose weight if you are overweight. Losing 5-10 lb (2.3-4.5 kg) can help prevent or control high cholesterol and reduce your risk for diabetes and high blood pressure. Ask your health care provider to help you with a diet and exercise plan to safely lose weight.  · Get enough exercise. Do at least 150 minutes of moderate-intensity exercise each week.  ¨ You could do this in short exercise sessions several times a day, or you could do longer exercise sessions a few times a week. For example, you could take a brisk 10-minute walk or bike ride, 3 times a day, for 5 days a week.  · Do not smoke. If you need help  quitting, ask your health care provider.  · Limit your alcohol intake. If you drink alcohol, limit alcohol intake to no more than 1 drink a day for nonpregnant women and 2 drinks a day for men. One drink equals 12 oz of beer, 5 oz of wine, or 1½ oz of hard liquor.  Why are these changes important?  If you have high cholesterol, deposits (plaques) may build up on the walls of your blood vessels. Plaques make the arteries narrower and stiffer, which can restrict or block blood flow and cause blood clots to form. This greatly increases your risk for heart attack and stroke. Making diet and lifestyle changes can reduce your risk for these life-threatening conditions.  What can I do to lower my risk?  · Manage your risk factors for high cholesterol. Talk with your health care provider about all of your risk factors and how to lower your risk.  · Manage other conditions that you have, such as diabetes or high blood pressure (hypertension).  · Have your cholesterol checked at regular intervals.  · Keep all follow-up visits as told by your health care provider. This is important.  How is this treated?  In addition to diet and lifestyle changes, your health care provider may recommend medicines to help lower cholesterol, such as a medicine to reduce the amount of cholesterol made in your liver. You may need medicine if:  · Diet and lifestyle changes do not lower your cholesterol enough.  · You have high cholesterol and other risk factors for heart disease or stroke.  Take over-the-counter and prescription medicines only as told by your health care provider.  Where to find more information:  · American Heart Association: www.heart.org/HEARTORG/Conditions/Cholesterol/Cholesterol_UC_001089_SubHomePage.jsp  · National Heart, Lung, and Blood [place]: www.nhlbi.nih.gov/health/resources/heart/heart-cholesterol-hbc-what-html  Summary  · High cholesterol increases your risk for heart disease and stroke. By keeping your cholesterol  level low, you can reduce your risk for these conditions.  · Diet and lifestyle changes are the most important steps in preventing high cholesterol.  · Work with your health care provider to manage your risk factors, and have your blood tested regularly.  This information is not intended to replace advice given to you by your health care provider. Make sure you discuss any questions you have with your health care provider.  Document Released: 01/01/2017 Document Revised: 08/26/2017 Document Reviewed: 08/26/2017  [employer] Interactive Patient Education © 2017 [employer]

## 2018-06-27 NOTE — ASSESSMENT & PLAN NOTE
Patient reported that she has been very tired lately and she tries to take vitamin D 4000 units regularly.  Her vitamin D level is stable with current supplements.  Recent vitamin D level was 45 on 6/26/18.

## 2018-06-27 NOTE — ASSESSMENT & PLAN NOTE
Patient is taking metoprolol SR 25 mg daily for about 1 year.  She stopped taking metoprolol 1 month ago as her allergy specialist recommend not to take any beta-blocker.  Patient stated that she weaned down metoprolol very slowly as instructed.  After she discontinue metoprolol completely, she started having high blood pressure, chest tightness and difficult to breathe.  She went to ER on 6/5/18 and found to have hypertensive urgency.  She had negative cardiac stress test, negative troponin tests, negative d-dimer and normal thyroid hormone during admission on 6/5/18.  She was discharged with new medication, lisinopril 20 mg daily.  Patient states that her blood pressure has been well controlled with lisinopril.  However she is still feeling tired and fatigued.

## 2018-06-27 NOTE — PROGRESS NOTES
Subjective:   Danii Rodriguez is a 57 y.o. female here today for evaluation and management of:      Vitamin D insufficiency  Patient reported that she has been very tired lately and she tries to take vitamin D 4000 units regularly.  Her vitamin D level is stable with current supplements.  Recent vitamin D level was 45 on 6/26/18.    Pure hypercholesterolemia  Patient stated that she is taking atorvastatin 20 mg 1 tablets every evening regularly for at least 3 months.  However she likes to eat cheese, nuts and French fries.  She reported feeling muscle cramp, generalized fatigue and weakness.  She is afraid of she can pass out because of the tiredness.  Her cholesterol is not well controlled yet.  Patient wants to try different medicine.  We discussed to switch atorvastatin to rosuvastatin to see if she has any side effects from taking rosuvastatin.  Her liver enzymes, kidney functions and electrolytes are within normal on 6/26/18.  She has normal CBC.  I reviewed her blood tests with her in clinic today.    Results for DANII RODRIGUEZ (MRN [id number]) as of 6/27/2018 09:28   Ref. Range 1/20/2018 08:04 6/26/2018 11:39   Cholesterol,Tot Latest Ref Range: 100 - 199 mg/dL 207 (H) 235 (H)   Triglycerides Latest Ref Range: 0 - 149 mg/dL 113 133   HDL Latest Ref Range: >=40 mg/dL 62 64   LDL Latest Ref Range: <100 mg/dL 122 (H) 144 (H)       Essential hypertension  Patient is taking metoprolol SR 25 mg daily for about 1 year.  She stopped taking metoprolol 1 month ago as her allergy specialist recommend not to take any beta-blocker.  Patient stated that she weaned down metoprolol very slowly as instructed.  After she discontinue metoprolol completely, she started having high blood pressure, chest tightness and difficult to breathe.  She went to ER on 6/5/18 and found to have hypertensive urgency.  She had negative cardiac stress test, negative troponin tests, negative d-dimer and normal thyroid hormone during admission  on 6/5/18.  She was discharged with new medication, lisinopril 20 mg daily.  Patient states that her blood pressure has been well controlled with lisinopril.  However she is still feeling tired and fatigued.    Chronic fatigue  Patient reported feeling tired and fatigued.  She has hypertensive urgency after stopping metoprolol.  Patient did not want to take beta-blocker as she could not receive allergy shots when she was taking beta-blocker.  Patient states that her blood pressure has been stable with lisinopril 20 mg daily.  She has very mild cough from lisinopril but she states that she is tolerating well and she does not want to switch to other blood pressure medication yet.  Her vitamin D level was normal at 45 on 6/26/18.  She has normal thyroid hormone on 6/5/18.  Her CBC and CMP on 6/5/18 and 6/26/18 are generally normal except slightly high fasting blood sugar at 101.         Current medicines (including changes today)  Current Outpatient Prescriptions   Medication Sig Dispense Refill   • rosuvastatin (CRESTOR) 10 MG Tab Take 1 Tab by mouth every evening. 30 Tab 11   • albuterol 108 (90 Base) MCG/ACT Aero Soln inhalation aerosol Inhale 2 Puffs by mouth every 6 hours as needed for Shortness of Breath.     • aspirin EC 81 MG EC tablet Take 1 Tab by mouth every day. 30 Tab    • lisinopril (PRINIVIL) 20 MG Tab Take 1 Tab by mouth every day. 30 Tab 2   • TURMERIC PO Take 1 Tab by mouth every day.     • fluticasone (FLONASE) 50 MCG/ACT nasal spray SPRAY 2 SPRAYS IN NOSE EVERY DAY. 1 Bottle 6   • omeprazole (PRILOSEC) 40 MG delayed-release capsule Take 40 mg by mouth every day.  5   • Probiotic Product (PROBIOTIC COLON SUPPORT PO) Take 1 Cap by mouth every day.     • Cholecalciferol (VITAMIN D3) 2000 UNIT Cap Take 4,000 Units by mouth every day.       No current facility-administered medications for this visit.      She  has a past medical history of Anesthesia; Hypertension; Indigestion; MRSA (methicillin  "resistant staph aureus) culture positive (2010); Unspecified disorder of thyroid; and Unspecified urinary incontinence.    ROS   No chest pain, no shortness of breath, no abdominal pain       Objective:     Blood pressure 114/62, pulse 72, temperature 36.7 °C (98 °F), height 1.608 m (5' 3.3\"), weight 66.2 kg (146 lb), SpO2 96 %, not currently breastfeeding. Body mass index is 25.62 kg/m².   Physical Exam:  General: Alert, oriented and no acute distress.  Eye contact is good, speech goal directed, affect calm  HEENT: conjunctiva non-injected, sclera non-icteric.  Oral mucous membranes pink and moist with no lesions.  Pinna normal.   Lungs: Normal respiratory effort, clear to auscultation bilaterally with good excursion.  CV: regular rate and rhythm. No murmurs.   Abdomen: soft, non distended, nontender, Bowel sound normal.  Ext: no edema, color normal, vascularity normal, temperature normal        Assessment and Plan:   The following treatment plan was discussed     1. Pure hypercholesterolemia  - Will discontinue atorvastatin 20 mg as patient feeling tired and fatigued muscle ache from taking it.  Cholesterol is not well controlled yet.  Patient would like to try different statin.  We discussed to try rosuvastatin 10 mg 1 tablets every evening.  I reviewed potential side effects of rosuvastatin with patient.  - Advised to eat low fat, low carbohydrate and high fiber diet as well as do cardio physical exercise regularly.  - Will recheck lab in 3 months.  - rosuvastatin (CRESTOR) 10 MG Tab; Take 1 Tab by mouth every evening.  Dispense: 30 Tab; Refill: 11  - COMP METABOLIC PANEL; Future  - LIPID PROFILE; Future    2. Essential hypertension  - Blood pressure is stable and well controlled with lisinopril 20 mg daily.  She tolerates lisinopril without significant side effects currently.  She agrees to continue with lisinopril with the same dose.  - Recommend to monitor blood pressure and heart rate at home.  - Advised to " keep well-hydrated and avoid French fries or high salt diet.  - COMP METABOLIC PANEL; Future    3. Vitamin D insufficiency  - Well-controlled. Continue current regimens, vitamin D 4000 units daily. Recheck lab 1-2 weeks before next follow up visit.    4. IFG (impaired fasting glucose)  - We discussed to cut down starchy and high carbohydrate diet and avoid French fries.  We discussed regular physical exercise.  Will follow up with blood tests in 3 months.  - COMP METABOLIC PANEL; Future  - HEMOGLOBIN A1C; Future    5. Chronic fatigue  - Patient has negative cardiac workup in hospital on 6/5/18.  She has normal thyroid hormone and normal vitamin D level.  Her fatigue is likely related to uncontrolled hypertension versus stress versus medication induced tiredness or fatigue.  Patient is already off from metoprolol for at least one month.  We will switch atorvastatin to rosuvastatin.  I discussed with patient for balanced nutrition, low-sodium, low fat, low carbohydrate diet and high-fiber diet.  I also encourage patient to keep well-hydrated with water.  - She can try coenzyme Q 10  and magnesium supplements.  - We also discussed how to cope stress, regular yoga exercise, stretching exercise and different relaxation techniques to have enough rest.        Followup: Return in about 3 months (around 9/27/2018), or if symptoms worsen or fail to improve, for Hypertension, Hyperlipidemia, Impaired fasting glucose, fatigue, Lab review.      Please note that this dictation was created using voice recognition software. I have made every reasonable attempt to correct obvious errors, but I expect that there may have unintended errors in text, spelling, punctuation, or grammar that I did not discover.

## 2018-06-27 NOTE — ASSESSMENT & PLAN NOTE
Patient stated that she is taking atorvastatin 20 mg 1 tablets every evening regularly for at least 3 months.  However she likes to eat cheese, nuts and French fries.  She reported feeling muscle cramp, generalized fatigue and weakness.  She is afraid of she can pass out because of the tiredness.  Her cholesterol is not well controlled yet.  Patient wants to try different medicine.  We discussed to switch atorvastatin to rosuvastatin to see if she has any side effects from taking rosuvastatin.  Her liver enzymes, kidney functions and electrolytes are within normal on 6/26/18.  She has normal CBC.  I reviewed her blood tests with her in clinic today.    Results for CLAY VALENTINE (MRN [id number]) as of 6/27/2018 09:28   Ref. Range 1/20/2018 08:04 6/26/2018 11:39   Cholesterol,Tot Latest Ref Range: 100 - 199 mg/dL 207 (H) 235 (H)   Triglycerides Latest Ref Range: 0 - 149 mg/dL 113 133   HDL Latest Ref Range: >=40 mg/dL 62 64   LDL Latest Ref Range: <100 mg/dL 122 (H) 144 (H)

## 2018-07-09 ENCOUNTER — PATIENT MESSAGE (OUTPATIENT)
Dept: MEDICAL GROUP | Age: 58
End: 2018-07-09

## 2018-07-09 DIAGNOSIS — I10 ESSENTIAL HYPERTENSION: ICD-10-CM

## 2018-07-09 DIAGNOSIS — E78.00 PURE HYPERCHOLESTEROLEMIA: ICD-10-CM

## 2018-07-09 NOTE — TELEPHONE ENCOUNTER
From: Danii Rodriguez  To: Juany Ralph M.D.  Sent: 7/9/2018 12:08 PM PDT  Subject: Non-Urgent Medical Question    Dr. Ralph,    I have been on the new med for my cholesterol for 13 days, I am having the same issues with this med as I did with Lipitor.   very tired, heavy arms and legs, I get a sensation of wanting to pass out or light headedness from the simplest of tasks of washing my hair, bending over to  anything or to pull weeds, walking across the room, sore muscles, dizziness, I don't feel right.    Please let me know what's next, this is not normal for me. I usually have a ton of energy, I have also changed up my diet and have already lost 6 Lbs. with the changes I've made, I am unable to excersie do to wanting to pass out with any exertion. Please let me know the next step.  Danii Rodriguez

## 2018-07-10 NOTE — TELEPHONE ENCOUNTER
Patient is not tolerated statin treatment due to generalized muscle ache and weakness.  Her cholesterol is not well controlled with diet and exercise alone.  Patient agreed to refer to vascular medicine for further assessment and treatment.    Juany Ralph M.D.

## 2018-08-23 ENCOUNTER — OFFICE VISIT (OUTPATIENT)
Dept: VASCULAR LAB | Facility: MEDICAL CENTER | Age: 58
End: 2018-08-23
Attending: INTERNAL MEDICINE
Payer: COMMERCIAL

## 2018-08-23 VITALS
BODY MASS INDEX: 24.92 KG/M2 | WEIGHT: 146 LBS | HEART RATE: 76 BPM | HEIGHT: 64 IN | SYSTOLIC BLOOD PRESSURE: 126 MMHG | DIASTOLIC BLOOD PRESSURE: 43 MMHG

## 2018-08-23 DIAGNOSIS — E78.00 PURE HYPERCHOLESTEROLEMIA: ICD-10-CM

## 2018-08-23 DIAGNOSIS — I10 ESSENTIAL HYPERTENSION: ICD-10-CM

## 2018-08-23 PROCEDURE — 99204 OFFICE O/P NEW MOD 45 MIN: CPT | Performed by: FAMILY MEDICINE

## 2018-08-23 PROCEDURE — 99202 OFFICE O/P NEW SF 15 MIN: CPT | Performed by: FAMILY MEDICINE

## 2018-08-23 RX ORDER — PRAVASTATIN SODIUM 10 MG
10 TABLET ORAL DAILY
Qty: 90 TAB | Refills: 3 | Status: SHIPPED | OUTPATIENT
Start: 2018-08-23 | End: 2018-10-11

## 2018-08-23 ASSESSMENT — ENCOUNTER SYMPTOMS
FOCAL WEAKNESS: 0
DOUBLE VISION: 0
HEADACHES: 0
CHILLS: 0
SEIZURES: 0
FEVER: 0
DEPRESSION: 0
NAUSEA: 0
DIARRHEA: 0
BLURRED VISION: 0
COUGH: 0
ORTHOPNEA: 0
SHORTNESS OF BREATH: 0
WEAKNESS: 0
DIZZINESS: 0
NERVOUS/ANXIOUS: 0
SORE THROAT: 0
WHEEZING: 0
MYALGIAS: 0
BLOOD IN STOOL: 0
TREMORS: 0
VOMITING: 0
HEMOPTYSIS: 0
BRUISES/BLEEDS EASILY: 0
INSOMNIA: 0
ABDOMINAL PAIN: 0
PALPITATIONS: 0

## 2018-08-23 NOTE — PROGRESS NOTES
INITIAL VASCULAR MED VISIT  Subjective:   Danii Rodriguez is a 57 y.o. female who presents today 2018 for   Chief Complaint   Patient presents with   • New Patient     essential hypertension   Referred by Dr. Ralph (PCP) for evaluation and mgmt of HLD in context of prior statin intolerance    HPI:  Danii Rodriguez had been placed on rosuvastatin 10mg daily due to HLD.  Last labs reviewed and noted to have LDL = 144.  Has been unable to control with TLC measures.   Had been on lipitor since 2018.     Current HTN concerns: Denies   HTN sx:  No current blurred or changed vision, chest pain, shortness of breath, headache, nausea, dizziness/vertigo   Home BP log:  Does not check consistently  Adherence to current HTN meds: compliant all of the time   Antiplatelet/anticoagulation: Yes, Details: ASA 18mg daily, no bleeding or GI upset   Hyperlipidemia: Yes, Details: had been placed on atorvastatin 20mg.  Had increased cholesterol.  Changed to crestor 10mg and then worsened and noted to have extreme fatigue.  Then changed to 3 days per week and has improved over last several weeks.   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     Had negative testing in 18 through admission.  Had d-dimer, TnI, BNP negative.     Pertinent HTN hx:   Age at HTN dx:  1 year ago   (if female, any hx of pregnancy-related HTN):  None   Past HTN medications: had been on metoprolol and felt fatigued and had to stop due to allergy testing and fatigue.  Currently lisinopril   HTN crises:  No prior hospitalization or crises, 2018 - admitted for elevated BP with negative cardiac w/u  ASCVD risk factors:     DM: No   CKD:  No  Lifestyle factors:     High salt: No   EtOH: wine, 6 drinks/week   Interfering substances:     NSAIDs: avoids    Decongestants. No, using allergy shots to help     ASCVD calculator (contraindicated if ASCVD+, [id number])   10yr-risk ca.7%     Clinical evidence of ASCVD:     1) hx of MI/ACS:  No   2)  coronary or other revascularization procedure: No   3) TIA/ischemic CVA: No   4) PAD (including CLARIBEL <0.9): No   5) documented (CAD, Renal athero, AA due to athero, carotid plaque >50% stenosis: No    Major RFs:     1) Age (Men>44, women>54):  yes   2) Fhx early CAD (<55 men, <65 women):  no   3) cigarette smoking:  No   4) high BP >139/89 or on tx: yes   5) Low HDL-C (<40 men, <50 women):  no    Past Medical History:   Diagnosis Date   • Anesthesia     ponv   • Hypertension    • Indigestion    • MRSA (methicillin resistant staph aureus) culture positive    • Unspecified disorder of thyroid    • Unspecified urinary incontinence      Past Surgical History:   Procedure Laterality Date   • CARPAL TUNNEL ENDOSCOPIC  2014    Both side Performed by Edgar Leblanc M.D. at [facility]   • ABDOMINAL HYSTERECTOMY TOTAL      Hysterectomy and removal Ovaries and tubes, Total Abdominal in    • GYN SURGERY     • NASAL SEPTAL RECONSTRUCTION         • OTHER SURGICAL PROCEDURE      Removal of polyps from urinary bladder and dilation of urethra in    • OTHER SURGICAL PROCEDURE      bone graft surgery on left second toe in  due to fracture   • OTHER SURGICAL PROCEDURE      I&D of left ear for ear infection twice in  and  by Dr Kahn, ENT     Family History   Problem Relation Age of Onset   • Cancer Paternal Grandmother    • Other Paternal Grandmother         lupus   • Heart Disease Paternal Grandmother          70s   • Cancer Paternal Aunt    • Other Mother         Parkinson disease   • Bipolar disorder Mother    • Hypertension Mother    • Thyroid Mother         Hypothyroid   • Heart Disease Father         Congenital heart disease, CHF   • Hypertension Father    • Diabetes Father    • Stroke Sister         age 53, smoker   • Bipolar disorder Sister    • Other Sister         alcoholic      History   Smoking Status   • Former Smoker   • Quit date: 1976   Smokeless Tobacco   • Never  Used     Social History   Substance Use Topics   • Smoking status: Former Smoker     Quit date: 1/18/1976   • Smokeless tobacco: Never Used   • Alcohol use 3.6 oz/week     6 Glasses of wine per week     Outpatient Encounter Prescriptions as of 8/23/2018   Medication Sig Dispense Refill   • pravastatin (PRAVACHOL) 10 MG Tab Take 1 Tab by mouth every day for 360 days. 90 Tab 3   • aspirin EC 81 MG EC tablet Take 1 Tab by mouth every day. 30 Tab    • lisinopril (PRINIVIL) 20 MG Tab Take 1 Tab by mouth every day. 30 Tab 2   • fluticasone (FLONASE) 50 MCG/ACT nasal spray SPRAY 2 SPRAYS IN NOSE EVERY DAY. 1 Bottle 6   • omeprazole (PRILOSEC) 40 MG delayed-release capsule Take 40 mg by mouth every day.  5   • Probiotic Product (PROBIOTIC COLON SUPPORT PO) Take 1 Cap by mouth every day.     • Cholecalciferol (VITAMIN D3) 2000 UNIT Cap Take 4,000 Units by mouth every day.     • [DISCONTINUED] rosuvastatin (CRESTOR) 10 MG Tab Take 1 Tab by mouth every evening. 30 Tab 11   • albuterol 108 (90 Base) MCG/ACT Aero Soln inhalation aerosol Inhale 2 Puffs by mouth every 6 hours as needed for Shortness of Breath.     • TURMERIC PO Take 1 Tab by mouth every day.       No facility-administered encounter medications on file as of 8/23/2018.      Allergies   Allergen Reactions   • Ciprofloxacin Hives     Okay to take cipro ear drops, no reaction with ear drop   • Other Environmental      seasonal   • Vicodin [Hydrocodone-Acetaminophen] Rash     Rash      DIET AND EXERCISE:  Weight Change: none   Diet: low fat  Exercise: minimal exercise     Review of Systems   Constitutional: Negative for chills, fever and malaise/fatigue.   HENT: Negative for nosebleeds, sore throat and tinnitus.    Eyes: Negative for blurred vision and double vision.   Respiratory: Negative for cough, hemoptysis, shortness of breath and wheezing.    Cardiovascular: Negative for chest pain, palpitations, orthopnea and leg swelling.   Gastrointestinal: Negative for  "abdominal pain, blood in stool, diarrhea, melena, nausea and vomiting.   Genitourinary: Negative for hematuria.   Musculoskeletal: Negative for joint pain and myalgias.   Skin: Negative for itching and rash.   Neurological: Negative for dizziness, tremors, focal weakness, seizures, weakness and headaches.   Endo/Heme/Allergies: Does not bruise/bleed easily.   Psychiatric/Behavioral: Negative for depression. The patient is not nervous/anxious and does not have insomnia.       Objective:     Vitals:    08/23/18 1522   BP: 126/43   Pulse: 76   Weight: 66.2 kg (146 lb)   Height: 1.613 m (5' 3.5\")      BP Readings from Last 4 Encounters:   08/23/18 126/43   06/27/18 114/62   06/07/18 114/60   06/05/18 105/49       Body mass index is 25.46 kg/m².   BMI Readings from Last 4 Encounters:   08/23/18 25.46 kg/m²   06/27/18 25.62 kg/m²   06/07/18 26.39 kg/m²   06/05/18 25.85 kg/m²       Physical Exam   Constitutional: She is oriented to person, place, and time. She appears well-developed and well-nourished. No distress.   HENT:   Head: Normocephalic and atraumatic.   Neck: Normal range of motion. Neck supple. No JVD present. No thyromegaly present.   Cardiovascular: Normal rate, regular rhythm, normal heart sounds and intact distal pulses.  Exam reveals no gallop and no friction rub.    No murmur heard.  Pulses:       Carotid pulses are 2+ on the right side, and 2+ on the left side.       Radial pulses are 2+ on the right side, and 2+ on the left side.        Dorsalis pedis pulses are 2+ on the right side, and 2+ on the left side.        Posterior tibial pulses are 2+ on the right side, and 2+ on the left side.   Edema     RLE: none     LLE: none   Spider telangectasia:       RLE:  None      LLE: none   Varicosities:         RLE: none      LLE: none   Corona phlebectatica:      RLE:  None       LLE:  None   Cording:         RLE:  None     LLE: None      Pulmonary/Chest: Effort normal and breath sounds normal.   Abdominal: Soft. " Bowel sounds are normal. She exhibits no distension and no mass. There is no tenderness. There is no rebound and no guarding.   Musculoskeletal: Normal range of motion. She exhibits no edema or tenderness.   Neurological: She is alert and oriented to person, place, and time. No cranial nerve deficit. She exhibits normal muscle tone. Coordination normal.   Skin: Skin is warm and dry. She is not diaphoretic.   Psychiatric: She has a normal mood and affect.     Lab Results   Component Value Date    CHOLSTRLTOT 235 (H) 06/26/2018     (H) 06/26/2018    HDL 64 06/26/2018    TRIGLYCERIDE 133 06/26/2018      Lab Results   Component Value Date    PROTHROMBTM 12.1 06/05/2018    INR 0.90 06/05/2018         Lab Results   Component Value Date    SODIUM 139 06/26/2018    POTASSIUM 4.6 06/26/2018    CHLORIDE 103 06/26/2018    CO2 27 06/26/2018    GLUCOSE 101 (H) 06/26/2018    BUN 18 06/26/2018    CREATININE 0.87 06/26/2018    IFAFR[place] >60 06/26/2018    IFNOTAFR >60 06/26/2018        Lab Results   Component Value Date    WBC 6.4 06/26/2018    RBC 4.70 06/26/2018    HEMOGLOBIN 14.2 06/26/2018    HEMATOCRIT 42.7 06/26/2018    MCV 90.9 06/26/2018    MCH 30.2 06/26/2018    MCHC 33.3 (L) 06/26/2018    MPV 11.3 06/26/2018      VASCULAR IMAGING:     MPI 6/5/18   NUCLEAR IMAGING INTERPRETATION   Normal left ventricular size, ejection fraction, and wall motion.   No evidence of significant jeopardized viable myocardium or prior myocardial    infarction.   ECG INTERPRETATION   Negative stress ECG for ischemia.    Medical Decision Making:  Today's Assessment / Status / Plan:     1. Pure hypercholesterolemia  pravastatin (PRAVACHOL) 10 MG Tab    C-REACTIVE PROTEIN CARDIAC    CRP HIGH SENSITIVE (CARDIAC)    APOLIPOPROTEIN B    NMR LIPO PROFILE    CREATINE KINASE    COMP METABOLIC PANEL   2. Essential hypertension       Patient Type: Primary Prevention    Etiology of Established CVD if Present: n/a    Lipid Management: Qualifies for  Statin Therapy Based on 2013 ACC/AHA Guidelines: yes  Calculated 10-Year Risk of ASCVD: 4.0%  Currently on Statin: Yes  NLA Risk Category:   Moderate: 2 major RFs, risk scoring >10%, other risk scoring (CAC, advanced lipid, hsCRP)  Tx threshold:  non-HDL-C >159, LDL-C >129  Tx goal:  non-HDL <130, LDL-C <100 (optional: apoB<90)   At goal:  no  Tx plan:  - consider additional testing:  CAC>299, hsCRP>1.9, Lp(a)>49, ACR>29  - start pravastatin 10mg 3x/week, update labs, titration to moderate-intensity over next several months as tolerated   - start vit D 4000 units daily     Blood Pressure Management:Goal: ACC/AHA (2017) goal <130/80  Home BP at goal:  yes  Office BP at goal:  yes  Plan:   - continue home BP monitoring, reviewed correct technique:  Yes   - order 24h ABPM:  UNDECIDED, consider if white coat effect     Glycemic Status: Diabetic  - check annual     Anti-Platelet/Anti-Coagulant Tx: yes  - continue ASA 81mg    Smoking: continued complete avoidance of all tobacco products      Physical Activity: advised to engage in walking >150min/week, most days     Weight Management and Nutrition: Dietary plan was discussed with patient at this visit including plate method, reduced kcal.  decrase 5% weight     Other:   1) allergy - continue with Dr. Berry       Instructed to follow-up with PCP for remainder of adult medical needs: yes  We will partner with other providers in the management of established vascular disease and cardiometabolic risk factors.    Studies to Be Obtained: none   Labs to Be Obtained:  CMP, lipid in 6 weeks     Follow up in: 2 months    Navneet Neal M.D.

## 2018-08-23 NOTE — PATIENT INSTRUCTIONS
1) change to pravastatin 10mg 3 days per week, stop crestor   2) call if you have issues with new med after 2 weeks ([phone])  3) 6 week - check labs   4) follow-up in 2 months to review

## 2018-09-09 DIAGNOSIS — I10 ESSENTIAL HYPERTENSION: ICD-10-CM

## 2018-09-10 RX ORDER — LISINOPRIL 20 MG/1
TABLET ORAL
Qty: 30 TAB | Refills: 11 | Status: SHIPPED | OUTPATIENT
Start: 2018-09-10 | End: 2019-07-18 | Stop reason: SDUPTHER

## 2018-09-20 ENCOUNTER — HOSPITAL ENCOUNTER (OUTPATIENT)
Dept: LAB | Facility: MEDICAL CENTER | Age: 58
End: 2018-09-20
Attending: INTERNAL MEDICINE
Payer: COMMERCIAL

## 2018-09-20 DIAGNOSIS — I10 ESSENTIAL HYPERTENSION: ICD-10-CM

## 2018-09-20 DIAGNOSIS — R73.01 IFG (IMPAIRED FASTING GLUCOSE): ICD-10-CM

## 2018-09-20 DIAGNOSIS — E78.00 PURE HYPERCHOLESTEROLEMIA: ICD-10-CM

## 2018-09-20 LAB
ALBUMIN SERPL BCP-MCNC: 4.5 G/DL (ref 3.2–4.9)
ALBUMIN/GLOB SERPL: 1.7 G/DL
ALP SERPL-CCNC: 65 U/L (ref 30–99)
ALT SERPL-CCNC: 17 U/L (ref 2–50)
ANION GAP SERPL CALC-SCNC: 7 MMOL/L (ref 0–11.9)
AST SERPL-CCNC: 18 U/L (ref 12–45)
BILIRUB SERPL-MCNC: 0.6 MG/DL (ref 0.1–1.5)
BUN SERPL-MCNC: 15 MG/DL (ref 8–22)
CALCIUM SERPL-MCNC: 9.6 MG/DL (ref 8.5–10.5)
CHLORIDE SERPL-SCNC: 105 MMOL/L (ref 96–112)
CHOLEST SERPL-MCNC: 240 MG/DL (ref 100–199)
CO2 SERPL-SCNC: 29 MMOL/L (ref 20–33)
CREAT SERPL-MCNC: 0.78 MG/DL (ref 0.5–1.4)
EST. AVERAGE GLUCOSE BLD GHB EST-MCNC: 114 MG/DL
FASTING STATUS PATIENT QL REPORTED: NORMAL
GLOBULIN SER CALC-MCNC: 2.6 G/DL (ref 1.9–3.5)
GLUCOSE SERPL-MCNC: 91 MG/DL (ref 65–99)
HBA1C MFR BLD: 5.6 % (ref 0–5.6)
HDLC SERPL-MCNC: 61 MG/DL
LDLC SERPL CALC-MCNC: 153 MG/DL
POTASSIUM SERPL-SCNC: 4.8 MMOL/L (ref 3.6–5.5)
PROT SERPL-MCNC: 7.1 G/DL (ref 6–8.2)
SODIUM SERPL-SCNC: 141 MMOL/L (ref 135–145)
TRIGL SERPL-MCNC: 128 MG/DL (ref 0–149)

## 2018-09-20 PROCEDURE — 80061 LIPID PANEL: CPT

## 2018-09-20 PROCEDURE — 80053 COMPREHEN METABOLIC PANEL: CPT

## 2018-09-20 PROCEDURE — 83036 HEMOGLOBIN GLYCOSYLATED A1C: CPT

## 2018-09-20 PROCEDURE — 36415 COLL VENOUS BLD VENIPUNCTURE: CPT

## 2018-09-27 ENCOUNTER — OFFICE VISIT (OUTPATIENT)
Dept: MEDICAL GROUP | Age: 58
End: 2018-09-27
Payer: COMMERCIAL

## 2018-09-27 VITALS
OXYGEN SATURATION: 97 % | HEART RATE: 71 BPM | TEMPERATURE: 98.2 F | WEIGHT: 142.6 LBS | BODY MASS INDEX: 24.34 KG/M2 | SYSTOLIC BLOOD PRESSURE: 108 MMHG | DIASTOLIC BLOOD PRESSURE: 62 MMHG | HEIGHT: 64 IN

## 2018-09-27 DIAGNOSIS — Z12.31 VISIT FOR SCREENING MAMMOGRAM: ICD-10-CM

## 2018-09-27 DIAGNOSIS — E78.00 PURE HYPERCHOLESTEROLEMIA: ICD-10-CM

## 2018-09-27 DIAGNOSIS — I10 ESSENTIAL HYPERTENSION: ICD-10-CM

## 2018-09-27 DIAGNOSIS — E55.9 VITAMIN D INSUFFICIENCY: ICD-10-CM

## 2018-09-27 DIAGNOSIS — K21.9 GASTROESOPHAGEAL REFLUX DISEASE WITHOUT ESOPHAGITIS: ICD-10-CM

## 2018-09-27 PROCEDURE — 99214 OFFICE O/P EST MOD 30 MIN: CPT | Performed by: INTERNAL MEDICINE

## 2018-09-27 NOTE — LETTER
[facility]  Juany Ralph M.D.  [address]  [place] [address]  Fax: [phone]   Authorization for Release/Disclosure of   Protected Health Information   Name: DANII RODRIGUEZ : 1960 SSN: [id number]   Address: [address] [place] [address] Phone:    [phone] (home)    I authorize the entity listed below to release/disclose the PHI below to:   [facility]/Juany Ralph M.D. and Juany Ralph M.D.   Provider or Entity Name:  [facility]   Address   City, State, Zip   Phone:      Fax:     Reason for request: continuity of care   Information to be released:    [  ] LAST COLONOSCOPY,  including any PATH REPORT and follow-up  [  ] LAST FIT/COLOGUARD RESULT [  ] LAST DEXA  [  ] LAST MAMMOGRAM  [XX] LAST PAP  [  ] LAST LABS [  ] RETINA EXAM REPORT  [  ] IMMUNIZATION RECORDS  [  ] Release all info      [  ] Check here and initial the line next to each item to release ALL health information INCLUDING  _____ Care and treatment for drug and / or alcohol abuse  _____ HIV testing, infection status, or AIDS  _____ Genetic Testing    DATES OF SERVICE OR TIME PERIOD TO BE DISCLOSED: _____________  I understand and acknowledge that:  * This Authorization may be revoked at any time by you in writing, except if your health information has already been used or disclosed.  * Your health information that will be used or disclosed as a result of you signing this authorization could be re-disclosed by the recipient. If this occurs, your re-disclosed health information may no longer be protected by State or Federal laws.  * You may refuse to sign this Authorization. Your refusal will not affect your ability to obtain treatment.  * This Authorization becomes effective upon signing and will  on (date) __________.      If no date is indicated, this Authorization will  one (1) year from the signature date.    Name: Danii Rodriguez    Signature:   Date:     2018       PLEASE  FAX REQUESTED RECORDS BACK TO: [phone]

## 2018-09-28 NOTE — ASSESSMENT & PLAN NOTE
Patient did not tolerate atorvastatin and rosuvastatin due to tired, fatigue and muscle ache.  She follows with vascular medicine Dr. Neal for cholesterol and blood pressure control.  She is advised to take pravastatin 10 mg 3 times a week for a few weeks and slowly increase to 5 times a week and then 7 times a week.  She stated that she started taking pravastatin 5 times a week this week.  She has not noticed any discomfort yet.  Her cholesterol level remains high and is not at goal yet.  Patient reported that she has not been exercising regularly and she is also stressed eater.  She initially needed to cook for 2 houses for her mom and herself.  She stated that her mom moved to her house and she hopes that she can decrease her stress as she does not require to visit her mom's house frequently.  She also reported stress from other family issues.  Patient is taking coenzyme Q 10, and aspirin 81 mg daily.  She has appointment with Dr. Neal, vascular medicine on 10/11/18.    Results for CLAY VALENTINE (MRN [id number]) as of 9/27/2018 19:13   Ref. Range 6/26/2018 11:39 9/20/2018 08:20   Cholesterol,Tot Latest Ref Range: 100 - 199 mg/dL 235 (H) 240 (H)   Triglycerides Latest Ref Range: 0 - 149 mg/dL 133 128   HDL Latest Ref Range: >=40 mg/dL 64 61   LDL Latest Ref Range: <100 mg/dL 144 (H) 153 (H)

## 2018-09-28 NOTE — ASSESSMENT & PLAN NOTE
Patient did not tolerate metoprolol due to fatigue.  And her allergy specialist also recommend not to take metoprolol as she needs to have allergy shot frequently.  Patient is currently taking lisinopril 20 mg daily.  Her blood pressure is stable and well controlled with current regimens.  She denies side effects from taking lisinopril.  CMP showed normal electrolytes and kidney function on 9/20/18.

## 2018-09-28 NOTE — PROGRESS NOTES
Subjective:   Danii Rodriguez is a 57 y.o. female here today for evaluation and management of:      Vitamin D insufficiency  Patient stated that she is taking vitamin D 4000 units daily regularly.  Her vitamin D level is well controlled with supplements.  Last vitamin D level was 45 on 6/26/18.    Pure hypercholesterolemia  Patient did not tolerate atorvastatin and rosuvastatin due to tired, fatigue and muscle ache.  She follows with vascular medicine Dr. Neal for cholesterol and blood pressure control.  She is advised to take pravastatin 10 mg 3 times a week for a few weeks and slowly increase to 5 times a week and then 7 times a week.  She stated that she started taking pravastatin 5 times a week this week.  She has not noticed any discomfort yet.  Her cholesterol level remains high and is not at goal yet.  Patient reported that she has not been exercising regularly and she is also stressed eater.  She initially needed to cook for 2 houses for her mom and herself.  She stated that her mom moved to her house and she hopes that she can decrease her stress as she does not require to visit her mom's house frequently.  She also reported stress from other family issues.  Patient is taking coenzyme Q 10, and aspirin 81 mg daily.  She has appointment with Dr. Neal, vascular medicine on 10/11/18.    Results for DANII RODRIGUEZ (MRN [id number]) as of 9/27/2018 19:13   Ref. Range 6/26/2018 11:39 9/20/2018 08:20   Cholesterol,Tot Latest Ref Range: 100 - 199 mg/dL 235 (H) 240 (H)   Triglycerides Latest Ref Range: 0 - 149 mg/dL 133 128   HDL Latest Ref Range: >=40 mg/dL 64 61   LDL Latest Ref Range: <100 mg/dL 144 (H) 153 (H)       Gastroesophageal reflux disease without esophagitis  Patient reported that she needs to take Prilosec 40 mg daily.  She denies side effects from taking it.  She stated that she could not decrease the dose of Prilosec due to recurrent symptoms.  She has history of esophageal ulcer found in  EGD done on 10/16/14.  Biopsy ruled out eosinophilic esophagitis or Dewitt's esophagus.  She wanted to continue Prilosec with the same dose 40 mg daily.  She is advised to take vitamin B 12, vitamin D and magnesium for supplements.    Essential hypertension  Patient did not tolerate metoprolol due to fatigue.  And her allergy specialist also recommend not to take metoprolol as she needs to have allergy shot frequently.  Patient is currently taking lisinopril 20 mg daily.  Her blood pressure is stable and well controlled with current regimens.  She denies side effects from taking lisinopril.  CMP showed normal electrolytes and kidney function on 9/20/18.         Current medicines (including changes today)  Current Outpatient Prescriptions   Medication Sig Dispense Refill   • coenzyme Q-10 30 MG capsule Take 60 mg by mouth every day.     • Multiple Vitamins-Minerals (WOMENS 50+ MULTI VITAMIN/MIN PO) Take  by mouth.     • lisinopril (PRINIVIL) 20 MG Tab TAKE 1 TABLET BY MOUTH EVERY DAY 30 Tab 11   • pravastatin (PRAVACHOL) 10 MG Tab Take 1 Tab by mouth every day for 360 days. 90 Tab 3   • albuterol 108 (90 Base) MCG/ACT Aero Soln inhalation aerosol Inhale 2 Puffs by mouth every 6 hours as needed for Shortness of Breath.     • aspirin EC 81 MG EC tablet Take 1 Tab by mouth every day. 30 Tab    • TURMERIC PO Take 1 Tab by mouth every day.     • fluticasone (FLONASE) 50 MCG/ACT nasal spray SPRAY 2 SPRAYS IN NOSE EVERY DAY. 1 Bottle 6   • omeprazole (PRILOSEC) 40 MG delayed-release capsule Take 40 mg by mouth every day.  5   • Probiotic Product (PROBIOTIC COLON SUPPORT PO) Take 1 Cap by mouth every day.     • Cholecalciferol (VITAMIN D3) 2000 UNIT Cap Take 4,000 Units by mouth every day.       No current facility-administered medications for this visit.      She  has a past medical history of Anesthesia; Hypertension; Indigestion; MRSA (methicillin resistant staph aureus) culture positive (2010); Unspecified disorder of  "thyroid; and Unspecified urinary incontinence.    ROS   No chest pain, no shortness of breath, no abdominal pain       Objective:     Blood pressure 108/62, pulse 71, temperature 36.8 °C (98.2 °F), temperature source Temporal, height 1.613 m (5' 3.5\"), weight 64.7 kg (142 lb 9.6 oz), SpO2 97 %, not currently breastfeeding. Body mass index is 24.86 kg/m².   Physical Exam:  General: Alert, oriented and no acute distress.  Eye contact is good, speech goal directed, affect calm  HEENT: conjunctiva non-injected, sclera non-icteric.  Oral mucous membranes pink and moist with no lesions.  Pinna normal.  Lungs: Normal respiratory effort, clear to auscultation bilaterally with good excursion.  CV: regular rate and rhythm. No murmurs.   Abdomen: soft, non distended, nontender, Bowel sound normal.  Ext: no edema, color normal, vascularity normal, temperature normal        Assessment and Plan:   The following treatment plan was discussed     1. Pure hypercholesterolemia  - Not well controlled yet.  Patient is trying to titrate up the dose of pravastatin slowly as she did not tolerate to atorvastatin and rosuvastatin in the past.  She stated that she is taking pravastatin 10 mg 3 times a week last week and she started taking 5 times a week this week.  Patient also reported eating a lot lately due to distress.  She denies side effects from taking pravastatin so far.  - Advised to eat low fat, low carbohydrate and high fiber diet as well as do cardio physical exercise regularly.  - Patient has follow-up appointment with Dr. Neal, vascular medicine on 10/11/18.  - COMP METABOLIC PANEL; Future  - LIPID PROFILE; Future    2. Essential hypertension  - Well-controlled. Continue current regimens, lisinopril 20 mg daily. Recheck lab 1-2 weeks before next follow up visit.  - Reviewed the risks and benefits as well as potential side effects of medications with patient.  - Discussed to eat low-sodium diet and encouraged to do regular " physical exercise.  - Recommend to monitor blood pressure and heart rate at home.  - CBC WITH DIFFERENTIAL; Future  - COMP METABOLIC PANEL; Future    3. Gastroesophageal reflux disease without esophagitis  - Well-controlled. Continue current regimens, omeprazole 40 mg daily. Recheck lab 1-2 weeks before next follow up visit.  - Advised to eat low acidic food and avoid spicy food.  Advised to eat early dinner.  Advised to elevate the head of the bed at night.    4. Vitamin D insufficiency  - Well-controlled. Continue current regimens, vitamin D 4000 units daily. Recheck lab 1-2 weeks before next follow up visit.  - VITAMIN D,25 HYDROXY; Future    5. Visit for screening mammogram  - Counseling for breast cancer screening.  Ordered screening mammogram to do on 12/4/18 or later.  - MA-SCREEN MAMMO W/CAD-BILAT; Future    6. Health Maintenance   -Patient reported that she has Pap smear regularly with Dr. Joelle Valentin, gynecologist.  Last Pap smear was in the last year and the report was normal.    Followup: Return in about 6 months (around 3/27/2019), or if symptoms worsen or fail to improve, for Hypertension, Hyperlipidemia, GERD, Vitamin D insufficiency, Lab review.      Please note that this dictation was created using voice recognition software. I have made every reasonable attempt to correct obvious errors, but I expect that there may have unintended errors in text, spelling, punctuation, or grammar that I did not discover.

## 2018-09-28 NOTE — ASSESSMENT & PLAN NOTE
Patient reported that she needs to take Prilosec 40 mg daily.  She denies side effects from taking it.  She stated that she could not decrease the dose of Prilosec due to recurrent symptoms.  She has history of esophageal ulcer found in EGD done on 10/16/14.  Biopsy ruled out eosinophilic esophagitis or Dewitt's esophagus.  She wanted to continue Prilosec with the same dose 40 mg daily.  She is advised to take vitamin B 12, vitamin D and magnesium for supplements.

## 2018-09-28 NOTE — ASSESSMENT & PLAN NOTE
Patient stated that she is taking vitamin D 4000 units daily regularly.  Her vitamin D level is well controlled with supplements.  Last vitamin D level was 45 on 6/26/18.

## 2018-10-05 ENCOUNTER — HOSPITAL ENCOUNTER (OUTPATIENT)
Dept: LAB | Facility: MEDICAL CENTER | Age: 58
End: 2018-10-05
Attending: INTERNAL MEDICINE
Payer: COMMERCIAL

## 2018-10-05 DIAGNOSIS — E78.00 PURE HYPERCHOLESTEROLEMIA: ICD-10-CM

## 2018-10-05 DIAGNOSIS — J30.2 SEASONAL ALLERGIC RHINITIS: ICD-10-CM

## 2018-10-05 DIAGNOSIS — E55.9 VITAMIN D INSUFFICIENCY: ICD-10-CM

## 2018-10-05 DIAGNOSIS — I10 ESSENTIAL HYPERTENSION: ICD-10-CM

## 2018-10-05 LAB
25(OH)D3 SERPL-MCNC: 60 NG/ML (ref 30–100)
ALBUMIN SERPL BCP-MCNC: 5.2 G/DL (ref 3.2–4.9)
ALBUMIN/GLOB SERPL: 2 G/DL
ALP SERPL-CCNC: 65 U/L (ref 30–99)
ALT SERPL-CCNC: 26 U/L (ref 2–50)
ANION GAP SERPL CALC-SCNC: 7 MMOL/L (ref 0–11.9)
AST SERPL-CCNC: 23 U/L (ref 12–45)
BASOPHILS # BLD AUTO: 0.9 % (ref 0–1.8)
BASOPHILS # BLD: 0.05 K/UL (ref 0–0.12)
BILIRUB SERPL-MCNC: 0.7 MG/DL (ref 0.1–1.5)
BUN SERPL-MCNC: 14 MG/DL (ref 8–22)
CALCIUM SERPL-MCNC: 10.4 MG/DL (ref 8.5–10.5)
CHLORIDE SERPL-SCNC: 104 MMOL/L (ref 96–112)
CHOLEST SERPL-MCNC: 247 MG/DL (ref 100–199)
CO2 SERPL-SCNC: 28 MMOL/L (ref 20–33)
CREAT SERPL-MCNC: 0.82 MG/DL (ref 0.5–1.4)
EOSINOPHIL # BLD AUTO: 0.08 K/UL (ref 0–0.51)
EOSINOPHIL NFR BLD: 1.4 % (ref 0–6.9)
ERYTHROCYTE [DISTWIDTH] IN BLOOD BY AUTOMATED COUNT: 46.1 FL (ref 35.9–50)
FASTING STATUS PATIENT QL REPORTED: NORMAL
GLOBULIN SER CALC-MCNC: 2.6 G/DL (ref 1.9–3.5)
GLUCOSE SERPL-MCNC: 93 MG/DL (ref 65–99)
HCT VFR BLD AUTO: 41.4 % (ref 37–47)
HDLC SERPL-MCNC: 76 MG/DL
HGB BLD-MCNC: 13.4 G/DL (ref 12–16)
IMM GRANULOCYTES # BLD AUTO: 0.01 K/UL (ref 0–0.11)
IMM GRANULOCYTES NFR BLD AUTO: 0.2 % (ref 0–0.9)
LDLC SERPL CALC-MCNC: 153 MG/DL
LYMPHOCYTES # BLD AUTO: 1.73 K/UL (ref 1–4.8)
LYMPHOCYTES NFR BLD: 29.6 % (ref 22–41)
MCH RBC QN AUTO: 29.8 PG (ref 27–33)
MCHC RBC AUTO-ENTMCNC: 32.4 G/DL (ref 33.6–35)
MCV RBC AUTO: 92.2 FL (ref 81.4–97.8)
MONOCYTES # BLD AUTO: 0.33 K/UL (ref 0–0.85)
MONOCYTES NFR BLD AUTO: 5.6 % (ref 0–13.4)
NEUTROPHILS # BLD AUTO: 3.65 K/UL (ref 2–7.15)
NEUTROPHILS NFR BLD: 62.3 % (ref 44–72)
NRBC # BLD AUTO: 0 K/UL
NRBC BLD-RTO: 0 /100 WBC
PLATELET # BLD AUTO: 305 K/UL (ref 164–446)
PMV BLD AUTO: 11.4 FL (ref 9–12.9)
POTASSIUM SERPL-SCNC: 4.3 MMOL/L (ref 3.6–5.5)
PROT SERPL-MCNC: 7.8 G/DL (ref 6–8.2)
RBC # BLD AUTO: 4.49 M/UL (ref 4.2–5.4)
SODIUM SERPL-SCNC: 139 MMOL/L (ref 135–145)
TRIGL SERPL-MCNC: 88 MG/DL (ref 0–149)
WBC # BLD AUTO: 5.9 K/UL (ref 4.8–10.8)

## 2018-10-05 PROCEDURE — 80053 COMPREHEN METABOLIC PANEL: CPT

## 2018-10-05 PROCEDURE — 82306 VITAMIN D 25 HYDROXY: CPT

## 2018-10-05 PROCEDURE — 85025 COMPLETE CBC W/AUTO DIFF WBC: CPT

## 2018-10-05 PROCEDURE — 36415 COLL VENOUS BLD VENIPUNCTURE: CPT

## 2018-10-05 PROCEDURE — 80061 LIPID PANEL: CPT

## 2018-10-05 RX ORDER — FLUTICASONE PROPIONATE 50 MCG
SPRAY, SUSPENSION (ML) NASAL
Qty: 16 G | Refills: 3 | Status: SHIPPED | OUTPATIENT
Start: 2018-10-05 | End: 2018-11-28 | Stop reason: SDUPTHER

## 2018-10-10 ENCOUNTER — HOSPITAL ENCOUNTER (OUTPATIENT)
Dept: LAB | Facility: MEDICAL CENTER | Age: 58
End: 2018-10-10
Attending: FAMILY MEDICINE
Payer: COMMERCIAL

## 2018-10-10 DIAGNOSIS — E78.00 PURE HYPERCHOLESTEROLEMIA: ICD-10-CM

## 2018-10-10 LAB
ALBUMIN SERPL BCP-MCNC: 5.1 G/DL (ref 3.2–4.9)
ALBUMIN/GLOB SERPL: 2.1 G/DL
ALP SERPL-CCNC: 63 U/L (ref 30–99)
ALT SERPL-CCNC: 24 U/L (ref 2–50)
ANION GAP SERPL CALC-SCNC: 8 MMOL/L (ref 0–11.9)
AST SERPL-CCNC: 22 U/L (ref 12–45)
BILIRUB SERPL-MCNC: 0.9 MG/DL (ref 0.1–1.5)
BUN SERPL-MCNC: 18 MG/DL (ref 8–22)
CALCIUM SERPL-MCNC: 9.8 MG/DL (ref 8.5–10.5)
CHLORIDE SERPL-SCNC: 102 MMOL/L (ref 96–112)
CK SERPL-CCNC: 151 U/L (ref 0–154)
CO2 SERPL-SCNC: 27 MMOL/L (ref 20–33)
CREAT SERPL-MCNC: 0.93 MG/DL (ref 0.5–1.4)
CRP SERPL HS-MCNC: 3.7 MG/L (ref 0–7.5)
FASTING STATUS PATIENT QL REPORTED: NORMAL
GLOBULIN SER CALC-MCNC: 2.4 G/DL (ref 1.9–3.5)
GLUCOSE SERPL-MCNC: 74 MG/DL (ref 65–99)
POTASSIUM SERPL-SCNC: 4.3 MMOL/L (ref 3.6–5.5)
PROT SERPL-MCNC: 7.5 G/DL (ref 6–8.2)
SODIUM SERPL-SCNC: 137 MMOL/L (ref 135–145)

## 2018-10-10 PROCEDURE — 86141 C-REACTIVE PROTEIN HS: CPT

## 2018-10-10 PROCEDURE — 82550 ASSAY OF CK (CPK): CPT

## 2018-10-10 PROCEDURE — 80061 LIPID PANEL: CPT

## 2018-10-10 PROCEDURE — 82172 ASSAY OF APOLIPOPROTEIN: CPT

## 2018-10-10 PROCEDURE — 36415 COLL VENOUS BLD VENIPUNCTURE: CPT

## 2018-10-10 PROCEDURE — 83704 LIPOPROTEIN BLD QUAN PART: CPT

## 2018-10-10 PROCEDURE — 80053 COMPREHEN METABOLIC PANEL: CPT

## 2018-10-11 ENCOUNTER — OFFICE VISIT (OUTPATIENT)
Dept: VASCULAR LAB | Facility: MEDICAL CENTER | Age: 58
End: 2018-10-11
Attending: FAMILY MEDICINE
Payer: COMMERCIAL

## 2018-10-11 VITALS
DIASTOLIC BLOOD PRESSURE: 46 MMHG | SYSTOLIC BLOOD PRESSURE: 114 MMHG | HEART RATE: 62 BPM | HEIGHT: 64 IN | WEIGHT: 141.1 LBS | BODY MASS INDEX: 24.09 KG/M2

## 2018-10-11 DIAGNOSIS — E78.00 PURE HYPERCHOLESTEROLEMIA: ICD-10-CM

## 2018-10-11 PROCEDURE — 99214 OFFICE O/P EST MOD 30 MIN: CPT | Performed by: FAMILY MEDICINE

## 2018-10-11 PROCEDURE — 99212 OFFICE O/P EST SF 10 MIN: CPT | Performed by: FAMILY MEDICINE

## 2018-10-11 RX ORDER — PRAVASTATIN SODIUM 20 MG
20 TABLET ORAL DAILY
Qty: 90 TAB | Refills: 3 | Status: SHIPPED | OUTPATIENT
Start: 2018-10-11 | End: 2019-04-18

## 2018-10-11 ASSESSMENT — ENCOUNTER SYMPTOMS
PALPITATIONS: 0
DOUBLE VISION: 0
DIZZINESS: 0
CHILLS: 0
COUGH: 0
DIARRHEA: 0
FOCAL WEAKNESS: 0
BLOOD IN STOOL: 0
MYALGIAS: 0
DEPRESSION: 0
BLURRED VISION: 0
BRUISES/BLEEDS EASILY: 0
NERVOUS/ANXIOUS: 0
NAUSEA: 0
HEMOPTYSIS: 0
VOMITING: 0
SEIZURES: 0
TREMORS: 0
SORE THROAT: 0
WEAKNESS: 0
ABDOMINAL PAIN: 0
ORTHOPNEA: 0
FEVER: 0
WHEEZING: 0
INSOMNIA: 0
SHORTNESS OF BREATH: 0
HEADACHES: 0

## 2018-10-11 NOTE — PROGRESS NOTES
FOLLOW-UP VASCULAR MED VISIT  Subjective:   Danii Rodriguez is a 57 y.o. female who presents today 10/11/18 for   Chief Complaint   Patient presents with   • Follow-Up     Pure hypercholesterolemia   Referred by Dr. Ralph (PCP) for evaluation and mgmt of HLD in context of prior statin intolerance    HPI:  LAST VISIT 18    Danii Rodriguez had been placed on rosuvastatin 10mg daily due to HLD.  Last labs reviewed and noted to have LDL = 144.  Has been unable to control with TLC measures.   Had been on lipitor since 2018.     Current HTN concerns: Denies   HTN sx:  No current blurred or changed vision, chest pain, shortness of breath, headache, nausea, dizziness/vertigo   Home BP los/60-70s  Adherence to current HTN meds: compliant all of the time   Antiplatelet/anticoagulation: Yes, Details: ASA 18mg daily, no bleeding or GI upset   Hyperlipidemia: Yes, Details: had been placed on atorvastatin 20mg.  Had increased cholesterol.  Changed to crestor 10mg and then worsened and noted to have extreme fatigue.  Then changed to 3 days per week and has improved over last several weeks.   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     Had negative testing in 18 through admission.  Had d-dimer, TnI, BNP negative.     Pertinent HTN hx:   Age at HTN dx:  1 year ago   (if female, any hx of pregnancy-related HTN):  None   Past HTN medications: had been on metoprolol and felt fatigued and had to stop due to allergy testing and fatigue.  Currently lisinopril   HTN crises:  No prior hospitalization or crises, 2018 - admitted for elevated BP with negative cardiac w/u  ASCVD risk factors:     DM: No   CKD:  No  Lifestyle factors:     High salt: No   EtOH: wine, 6 drinks/week   Interfering substances:     NSAIDs: avoids    Decongestants. No, using allergy shots to help     ASCVD calculator (contraindicated if ASCVD+, [id number])   10yr-risk ca.7%     Clinical evidence of ASCVD:     1) hx of MI/ACS:   No   2) coronary or other revascularization procedure: No   3) TIA/ischemic CVA: No   4) PAD (including CLARIBEL <0.9): No   5) documented (CAD, Renal athero, AA due to athero, carotid plaque >50% stenosis: No    Major RFs:     1) Age (Men>44, women>54):  yes   2) Fhx early CAD (<55 men, <65 women):  no   3) cigarette smoking:  No   4) high BP >139/89 or on tx: yes   5) Low HDL-C (<40 men, <50 women):  no    History   Smoking Status   • Former Smoker   • Quit date: 1/18/1976   Smokeless Tobacco   • Never Used     Social History   Substance Use Topics   • Smoking status: Former Smoker     Quit date: 1/18/1976   • Smokeless tobacco: Never Used   • Alcohol use 3.6 oz/week     6 Glasses of wine per week     Outpatient Encounter Prescriptions as of 10/11/2018   Medication Sig Dispense Refill   • pravastatin (PRAVACHOL) 20 MG Tab Take 1 Tab by mouth every day for 360 days. 90 Tab 3   • fluticasone (FLONASE) 50 MCG/ACT nasal spray SPRAY 2 SPRAYS IN NOSE EVERY DAY. 16 g 3   • TURMERIC PO Take 1 Tab by mouth every day.     • coenzyme Q-10 30 MG capsule Take 60 mg by mouth every day.     • Multiple Vitamins-Minerals (WOMENS 50+ MULTI VITAMIN/MIN PO) Take  by mouth.     • lisinopril (PRINIVIL) 20 MG Tab TAKE 1 TABLET BY MOUTH EVERY DAY 30 Tab 11   • [DISCONTINUED] pravastatin (PRAVACHOL) 10 MG Tab Take 1 Tab by mouth every day for 360 days. 90 Tab 3   • albuterol 108 (90 Base) MCG/ACT Aero Soln inhalation aerosol Inhale 2 Puffs by mouth every 6 hours as needed for Shortness of Breath.     • aspirin EC 81 MG EC tablet Take 1 Tab by mouth every day. 30 Tab    • omeprazole (PRILOSEC) 40 MG delayed-release capsule Take 40 mg by mouth every day.  5   • Probiotic Product (PROBIOTIC COLON SUPPORT PO) Take 1 Cap by mouth every day.     • Cholecalciferol (VITAMIN D3) 2000 UNIT Cap Take 4,000 Units by mouth every day.       No facility-administered encounter medications on file as of 10/11/2018.      Allergies   Allergen Reactions   •  "Ciprofloxacin Hives     Okay to take cipro ear drops, no reaction with ear drop   • Other Environmental      seasonal   • Vicodin [Hydrocodone-Acetaminophen] Rash     Rash      DIET AND EXERCISE:  Weight Change: none   Diet: low fat  Exercise: minimal exercise     Review of Systems   Constitutional: Negative for chills, fever and malaise/fatigue.   HENT: Negative for nosebleeds, sore throat and tinnitus.    Eyes: Negative for blurred vision and double vision.   Respiratory: Negative for cough, hemoptysis, shortness of breath and wheezing.    Cardiovascular: Negative for chest pain, palpitations, orthopnea and leg swelling.   Gastrointestinal: Negative for abdominal pain, blood in stool, diarrhea, melena, nausea and vomiting.   Genitourinary: Negative for hematuria.   Musculoskeletal: Negative for joint pain and myalgias.   Skin: Negative for itching and rash.   Neurological: Negative for dizziness, tremors, focal weakness, seizures, weakness and headaches.   Endo/Heme/Allergies: Does not bruise/bleed easily.   Psychiatric/Behavioral: Negative for depression. The patient is not nervous/anxious and does not have insomnia.       Objective:     Vitals:    10/11/18 1416   BP: 114/46   BP Location: Left arm   Patient Position: Sitting   BP Cuff Size: Adult   Pulse: 62   Weight: 64 kg (141 lb 1.6 oz)   Height: 1.613 m (5' 3.5\")      BP Readings from Last 4 Encounters:   10/11/18 114/46   09/27/18 108/62   08/23/18 126/43   06/27/18 114/62       Body mass index is 24.6 kg/m².   BMI Readings from Last 4 Encounters:   10/11/18 24.60 kg/m²   09/27/18 24.86 kg/m²   08/23/18 25.46 kg/m²   06/27/18 25.62 kg/m²       Physical Exam   Constitutional: She is oriented to person, place, and time. She appears well-developed and well-nourished. No distress.   HENT:   Head: Normocephalic and atraumatic.   Neck: Normal range of motion. Neck supple. No JVD present. No thyromegaly present.   Cardiovascular: Normal rate, regular rhythm, " normal heart sounds and intact distal pulses.  Exam reveals no gallop and no friction rub.    No murmur heard.  Pulses:       Carotid pulses are 2+ on the right side, and 2+ on the left side.       Radial pulses are 2+ on the right side, and 2+ on the left side.        Dorsalis pedis pulses are 2+ on the right side, and 2+ on the left side.        Posterior tibial pulses are 2+ on the right side, and 2+ on the left side.   Edema     RLE: none     LLE: none   Spider telangectasia:       RLE:  None      LLE: none   Varicosities:         RLE: none      LLE: none   Corona phlebectatica:      RLE:  None       LLE:  None   Cording:         RLE:  None     LLE: None      Pulmonary/Chest: Effort normal and breath sounds normal.   Abdominal: Soft. Bowel sounds are normal. She exhibits no distension and no mass. There is no tenderness. There is no rebound and no guarding.   Musculoskeletal: Normal range of motion. She exhibits no edema or tenderness.   Neurological: She is alert and oriented to person, place, and time. No cranial nerve deficit. She exhibits normal muscle tone. Coordination normal.   Skin: Skin is warm and dry. She is not diaphoretic.   Psychiatric: She has a normal mood and affect.     Lab Results   Component Value Date    CHOLSTRLTOT 248 (H) 10/10/2018    CHOLSTRLTOT 247 (H) 10/05/2018     (H) 10/05/2018    HDL 74 (H) 10/10/2018    HDL 76 10/05/2018    TRIGLYCERIDE 92 10/10/2018    TRIGLYCERIDE 88 10/05/2018    LDLPART 21.5 10/10/2018    LDLPART 1523 (H) 10/10/2018    SMLLDL 538 10/10/2018    LHDLPART 7.9 10/10/2018    LVLDLPT 5.4 (H) 10/10/2018    LDLCHOL 156 (H) 10/10/2018    HDLSIZE 9.0 10/10/2018    VLDLSIZE 52.1 (H) 10/10/2018    HDLPART >41.0 10/10/2018      Lab Results   Component Value Date    PROTHROMBTM 12.1 06/05/2018    INR 0.90 06/05/2018       Lab Results   Component Value Date    HBA1C 5.6 09/20/2018      Lab Results   Component Value Date    SODIUM 137 10/10/2018    POTASSIUM 4.3  10/10/2018    CHLORIDE 102 10/10/2018    CO2 27 10/10/2018    GLUCOSE 74 10/10/2018    BUN 18 10/10/2018    CREATININE 0.93 10/10/2018    IFAFRICA >60 10/10/2018    IFNOTAFR >60 10/10/2018        Lab Results   Component Value Date    WBC 5.9 10/05/2018    RBC 4.49 10/05/2018    HEMOGLOBIN 13.4 10/05/2018    HEMATOCRIT 41.4 10/05/2018    MCV 92.2 10/05/2018    MCH 29.8 10/05/2018    MCHC 32.4 (L) 10/05/2018    MPV 11.4 10/05/2018      VASCULAR IMAGING:     MPI 6/5/18   NUCLEAR IMAGING INTERPRETATION   Normal left ventricular size, ejection fraction, and wall motion.   No evidence of significant jeopardized viable myocardium or prior myocardial    infarction.   ECG INTERPRETATION   Negative stress ECG for ischemia.      Medical Decision Making:  Today's Assessment / Status / Plan:     1. Pure hypercholesterolemia  COMP METABOLIC PANEL    LIPID PROFILE     Patient Type: Primary Prevention    Etiology of Established CVD if Present: n/a    Lipid Management: Qualifies for Statin Therapy Based on 2013 ACC/AHA Guidelines: yes  Calculated 10-Year Risk of ASCVD: 4.0%  Currently on Statin: Yes  NLA Risk Category:   Moderate: 2 major RFs, risk scoring >10%, other risk scoring (CAC, advanced lipid, hsCRP)  Tx threshold:  non-HDL-C >159, LDL-C >129  Tx goal:  non-HDL <130, LDL-C <100 (optional: apoB<90)   At goal:  No, based upon 9/2018 labs   Tx plan:  - consider additional testing:  None at this time  - increase pravastatin to 20mg, titrate to 40-80mg if not at goal with subsequent testing   - start vit D 4000 units daily     Blood Pressure Management:Goal: ACC/AHA (2017) goal <130/80  Home BP at goal:  yes  Office BP at goal:  yes  Plan:   - continue home BP monitoring, reviewed correct technique:  Yes   - order 24h ABPM:  UNDECIDED, consider if white coat effect     Glycemic Status: Diabetic  - check annual     Anti-Platelet/Anti-Coagulant Tx: yes  - continue ASA 81mg    Smoking: continued complete avoidance of all tobacco  products      Physical Activity: advised to engage in walking >150min/week, most days     Weight Management and Nutrition: Dietary plan was discussed with patient at this visit including plate method, reduced kcal.  decrase 5% weight     Other:   1) allergy - continue with Dr. Berry       Instructed to follow-up with PCP for remainder of adult medical needs: yes  We will partner with other providers in the management of established vascular disease and cardiometabolic risk factors.    Studies to Be Obtained:  None   Labs to Be Obtained:  CMP, lipid in Dec     Follow up in:   Call results of lipid in Dec, o/w f/u 6mo with ROOSEVELT Neal M.D.

## 2018-10-11 NOTE — PATIENT INSTRUCTIONS
1) take pravastatin 20mg daily, recheck labs in December   2) we will call to determine if you need to increase to 40mg   3) follow-up in 6 months

## 2018-10-12 LAB — APO B100 SERPL-MCNC: 118 MG/DL (ref 55–125)

## 2018-10-13 LAB
CHOLEST SERPL-MCNC: 248 MG/DL
FASTING STATUS PATIENT QL REPORTED: NORMAL
HDL PARTICAL NO Q4363: >41 UMOL/L
HDL SIZE Q4361: 9 NM
HDLC SERPL-MCNC: 74 MG/DL (ref 40–59)
HLD.LARGE SERPL-SCNC: 7.9 UMOL/L
L VLDL PART NO Q4357: 5.4 NMOL/L
LDL SERPL QN: 21.5 NM
LDL SERPL-SCNC: 1523 NMOL/L
LDL SMALL SERPL-SCNC: 538 NMOL/L
LDLC SERPL CALC-MCNC: 156 MG/DL
PATHOLOGY STUDY: ABNORMAL
TRIGL SERPL-MCNC: 92 MG/DL (ref 30–149)
VLDL SIZE Q4362: 52.1 NM

## 2018-11-02 ENCOUNTER — OFFICE VISIT (OUTPATIENT)
Dept: URGENT CARE | Facility: CLINIC | Age: 58
End: 2018-11-02
Payer: COMMERCIAL

## 2018-11-02 VITALS
HEIGHT: 64 IN | SYSTOLIC BLOOD PRESSURE: 150 MMHG | DIASTOLIC BLOOD PRESSURE: 70 MMHG | WEIGHT: 142 LBS | OXYGEN SATURATION: 97 % | BODY MASS INDEX: 24.24 KG/M2 | TEMPERATURE: 98.9 F | HEART RATE: 82 BPM

## 2018-11-02 DIAGNOSIS — B00.9 HSV INFECTION: ICD-10-CM

## 2018-11-02 PROCEDURE — 99203 OFFICE O/P NEW LOW 30 MIN: CPT | Performed by: EMERGENCY MEDICINE

## 2018-11-02 RX ORDER — ACYCLOVIR 400 MG/1
400 TABLET ORAL 3 TIMES DAILY
Qty: 15 TAB | Refills: 0 | Status: SHIPPED | OUTPATIENT
Start: 2018-11-02 | End: 2018-11-07

## 2018-11-02 ASSESSMENT — ENCOUNTER SYMPTOMS
HEADACHES: 1
SORE THROAT: 0
SENSORY CHANGE: 0
DIZZINESS: 0
NAUSEA: 0
FEVER: 0
FOCAL WEAKNESS: 0
VOMITING: 0

## 2018-11-02 NOTE — PATIENT INSTRUCTIONS
Use over-the-counter pain reliever, such as acetaminophen (Tylenol), ibuprofen (Advil, Motrin) or naproxen (Aleve) as needed; follow package directions for dosing.  You may use over-the-counter docosanol (Abreva) applications five times daily as needed.  Herpes Labialis  You have a fever blister or cold sore (herpes labialis). These painful, grouped sores are caused by one of the herpes viruses (HSV1 most commonly). They are usually found around the lips and mouth, but the same infection can also affect other areas on the face such as the nose and eyes. Herpes infections take about 10 days to heal. They often occur again and again in the same spot. Other symptoms may include numbness and tingling in the involved skin, achiness, fever, and swollen glands in the neck. Colds, emotional stress, injuries, or excess sunlight exposure all seem to make herpes reappear. Herpes lip infections are contagious. Direct contact with these sores can spread the infection. It can also be spread to other parts of your own body.  TREATMENT   Herpes labialis is usually self-limited and resolves within 1 week. To reduce pain and swelling, apply ice packs frequently to the sores or suck on popsicles or frozen juice bars. Antiviral medicine may be used by mouth to shorten the duration of the breakout. Avoid spreading the infection by washing your hands often. Be careful not to touch your eyes or genital areas after handling the infected blisters. Do not kiss or have other intimate contact with others. After the blisters are completely healed you may resume contact. Use sunscreen to lessen recurrences.   If this is your first infection with herpes, or if you have a severe or repeated infections, your caregiver may prescribe one of the anti-viral drugs to speed up the healing. If you have sun-related flare-ups despite the use of sunscreen, starting oral anti-viral medicine before a prolonged exposure (going skiing or to the beach) can  prevent most episodes.   SEEK IMMEDIATE MEDICAL CARE IF:  · You develop a headache, sleepiness, high fever, vomiting, or severe weakness.  · You have eye irritation, pain, blurred vision or redness.  · You develop a prolonged infection not getting better in 10 days.     This information is not intended to replace advice given to you by your health care provider. Make sure you discuss any questions you have with your health care provider.     Document Released: 12/18/2006 Document Revised: 03/11/2013 Document Reviewed: 10/22/2010  [employer] Interactive Patient Education ©2016 [employer]

## 2018-11-02 NOTE — PROGRESS NOTES
Subjective:      Danii Rodriguez is a 58 y.o. female who presents with Rash            Rash   This is a new problem. Episode onset: 3 days. The affected locations include the lips and face. The rash is characterized by redness, pain and blistering. She was exposed to nothing. Associated symptoms include congestion. Pertinent negatives include no fever, sore throat or vomiting. Treatments tried: Abreva. The treatment provided mild relief. Her past medical history is significant for varicella.       Review of Systems   Constitutional: Positive for malaise/fatigue. Negative for fever.   HENT: Positive for congestion. Negative for ear pain and sore throat.    Gastrointestinal: Negative for nausea and vomiting.   Skin: Positive for rash. Negative for itching.   Neurological: Positive for headaches. Negative for dizziness, sensory change and focal weakness.     PMH:  has a past medical history of Anesthesia; Hypertension; Indigestion; MRSA (methicillin resistant staph aureus) culture positive (2010); Unspecified disorder of thyroid; and Unspecified urinary incontinence.  MEDS:   Current Outpatient Prescriptions:   •  acyclovir (ZOVIRAX) 400 MG tablet, Take 1 Tab by mouth 3 times a day for 5 days., Disp: 15 Tab, Rfl: 0  •  pravastatin (PRAVACHOL) 20 MG Tab, Take 1 Tab by mouth every day for 360 days., Disp: 90 Tab, Rfl: 3  •  fluticasone (FLONASE) 50 MCG/ACT nasal spray, SPRAY 2 SPRAYS IN NOSE EVERY DAY., Disp: 16 g, Rfl: 3  •  coenzyme Q-10 30 MG capsule, Take 60 mg by mouth every day., Disp: , Rfl:   •  Multiple Vitamins-Minerals (WOMENS 50+ MULTI VITAMIN/MIN PO), Take  by mouth., Disp: , Rfl:   •  lisinopril (PRINIVIL) 20 MG Tab, TAKE 1 TABLET BY MOUTH EVERY DAY, Disp: 30 Tab, Rfl: 11  •  albuterol 108 (90 Base) MCG/ACT Aero Soln inhalation aerosol, Inhale 2 Puffs by mouth every 6 hours as needed for Shortness of Breath., Disp: , Rfl:   •  aspirin EC 81 MG EC tablet, Take 1 Tab by mouth every day., Disp: 30 Tab, Rfl:  "  •  TURMERIC PO, Take 1 Tab by mouth every day., Disp: , Rfl:   •  omeprazole (PRILOSEC) 40 MG delayed-release capsule, Take 40 mg by mouth every day., Disp: , Rfl: 5  •  Probiotic Product (PROBIOTIC COLON SUPPORT PO), Take 1 Cap by mouth every day., Disp: , Rfl:   •  Cholecalciferol (VITAMIN D3) 2000 UNIT Cap, Take 4,000 Units by mouth every day., Disp: , Rfl:   ALLERGIES:   Allergies   Allergen Reactions   • Ciprofloxacin Hives     Okay to take cipro ear drops, no reaction with ear drop   • Other Environmental      seasonal   • Vicodin [Hydrocodone-Acetaminophen] Rash     Rash      SURGHX:   Past Surgical History:   Procedure Laterality Date   • CARPAL TUNNEL ENDOSCOPIC  9/30/2014    Both side Performed by Edgar Leblanc M.D. at [facility]   • ABDOMINAL HYSTERECTOMY TOTAL      Hysterectomy and removal Ovaries and tubes, Total Abdominal in 2000   • GYN SURGERY     • NASAL SEPTAL RECONSTRUCTION      2005   • OTHER SURGICAL PROCEDURE      Removal of polyps from urinary bladder and dilation of urethra in 2000   • OTHER SURGICAL PROCEDURE      bone graft surgery on left second toe in 2002 due to fracture   • OTHER SURGICAL PROCEDURE      I&D of left ear for ear infection twice in 2010 and 2011 by Dr Kahn, ENT     SOCHX:  reports that she quit smoking about 42 years ago. She has never used smokeless tobacco. She reports that she drinks about 3.6 oz of alcohol per week . She reports that she does not use drugs.  FH: family history includes Bipolar disorder in her mother and sister; Cancer in her paternal aunt and paternal grandmother; Diabetes in her father; Heart Disease in her father and paternal grandmother; Hypertension in her father and mother; Other in her mother, paternal grandmother, and sister; Stroke in her sister; Thyroid in her mother.       Objective:     /70 (BP Location: Right arm, Patient Position: Sitting)   Pulse 82   Temp 37.2 °C (98.9 °F) (Temporal)   Ht 1.613 m (5' 3.5\")   " Wt 64.4 kg (142 lb)   SpO2 97%   Breastfeeding? No   BMI 24.76 kg/m²      Physical Exam   Constitutional: She appears well-developed and well-nourished. She is cooperative.  Non-toxic appearance. She does not appear ill. No distress.   HENT:   Head: Normocephalic.   Right Ear: Tympanic membrane, external ear and ear canal normal.   Left Ear: External ear and ear canal normal.   Ears:    Nose: No mucosal edema or rhinorrhea.   Mouth/Throat: Uvula is midline and mucous membranes are normal. No trismus in the jaw. No oropharyngeal exudate, posterior oropharyngeal edema or posterior oropharyngeal erythema.       Eyes: Conjunctivae are normal.   Neck: Trachea normal. Neck supple.   Cardiovascular: Normal rate, regular rhythm and normal heart sounds.    Pulmonary/Chest: Effort normal and breath sounds normal.   Lymphadenopathy:        Head (left side): No submental and no submandibular adenopathy present.     She has no cervical adenopathy.   Neurological: She is alert.   No facial palsy.   Skin: Skin is warm and dry.   Psychiatric: She has a normal mood and affect.               Assessment/Plan:     1. HSV infection  Recommended supportive care measures, including rest and use of over-the-counter medications for relief of symptoms.  - acyclovir (ZOVIRAX) 400 MG tablet; Take 1 Tab by mouth 3 times a day for 5 days.  Dispense: 15 Tab; Refill: 0

## 2018-11-15 DIAGNOSIS — Z01.812 PRE-OPERATIVE LABORATORY EXAMINATION: ICD-10-CM

## 2018-11-15 LAB
ANION GAP SERPL CALC-SCNC: 6 MMOL/L (ref 0–11.9)
BUN SERPL-MCNC: 15 MG/DL (ref 8–22)
CALCIUM SERPL-MCNC: 9.7 MG/DL (ref 8.5–10.5)
CHLORIDE SERPL-SCNC: 104 MMOL/L (ref 96–112)
CO2 SERPL-SCNC: 26 MMOL/L (ref 20–33)
CREAT SERPL-MCNC: 0.8 MG/DL (ref 0.5–1.4)
GLUCOSE SERPL-MCNC: 112 MG/DL (ref 65–99)
POTASSIUM SERPL-SCNC: 4.4 MMOL/L (ref 3.6–5.5)
SODIUM SERPL-SCNC: 136 MMOL/L (ref 135–145)

## 2018-11-15 PROCEDURE — 36415 COLL VENOUS BLD VENIPUNCTURE: CPT

## 2018-11-15 PROCEDURE — 80048 BASIC METABOLIC PNL TOTAL CA: CPT

## 2018-11-15 NOTE — OR NURSING
Hx and meds reviewed, pre op instructions given. Anesthesia and Dr Leblanc's fasting guidelines reviewed with pt

## 2018-11-20 ENCOUNTER — HOSPITAL ENCOUNTER (OUTPATIENT)
Facility: MEDICAL CENTER | Age: 58
End: 2018-11-20
Attending: ORTHOPAEDIC SURGERY | Admitting: ORTHOPAEDIC SURGERY
Payer: COMMERCIAL

## 2018-11-20 VITALS
SYSTOLIC BLOOD PRESSURE: 118 MMHG | TEMPERATURE: 97 F | BODY MASS INDEX: 24.88 KG/M2 | HEART RATE: 69 BPM | OXYGEN SATURATION: 97 % | RESPIRATION RATE: 16 BRPM | DIASTOLIC BLOOD PRESSURE: 57 MMHG | HEIGHT: 63 IN | WEIGHT: 140.43 LBS

## 2018-11-20 PROBLEM — M65.311 TRIGGER THUMB, RIGHT THUMB: Status: ACTIVE | Noted: 2018-11-20

## 2018-11-20 PROCEDURE — 700101 HCHG RX REV CODE 250

## 2018-11-20 PROCEDURE — 160028 HCHG SURGERY MINUTES - 1ST 30 MINS LEVEL 3: Performed by: ORTHOPAEDIC SURGERY

## 2018-11-20 PROCEDURE — 700111 HCHG RX REV CODE 636 W/ 250 OVERRIDE (IP): Performed by: ANESTHESIOLOGY

## 2018-11-20 PROCEDURE — 160009 HCHG ANES TIME/MIN: Performed by: ORTHOPAEDIC SURGERY

## 2018-11-20 PROCEDURE — 160048 HCHG OR STATISTICAL LEVEL 1-5: Performed by: ORTHOPAEDIC SURGERY

## 2018-11-20 PROCEDURE — A9270 NON-COVERED ITEM OR SERVICE: HCPCS | Performed by: ANESTHESIOLOGY

## 2018-11-20 PROCEDURE — 160046 HCHG PACU - 1ST 60 MINS PHASE II: Performed by: ORTHOPAEDIC SURGERY

## 2018-11-20 PROCEDURE — 160025 RECOVERY II MINUTES (STATS): Performed by: ORTHOPAEDIC SURGERY

## 2018-11-20 PROCEDURE — 160047 HCHG PACU  - EA ADDL 30 MINS PHASE II: Performed by: ORTHOPAEDIC SURGERY

## 2018-11-20 PROCEDURE — 700111 HCHG RX REV CODE 636 W/ 250 OVERRIDE (IP)

## 2018-11-20 PROCEDURE — 160036 HCHG PACU - EA ADDL 30 MINS PHASE I: Performed by: ORTHOPAEDIC SURGERY

## 2018-11-20 PROCEDURE — A6222 GAUZE <=16 IN NO W/SAL W/O B: HCPCS | Performed by: ORTHOPAEDIC SURGERY

## 2018-11-20 PROCEDURE — 700102 HCHG RX REV CODE 250 W/ 637 OVERRIDE(OP): Performed by: ANESTHESIOLOGY

## 2018-11-20 PROCEDURE — 160035 HCHG PACU - 1ST 60 MINS PHASE I: Performed by: ORTHOPAEDIC SURGERY

## 2018-11-20 PROCEDURE — 502576 HCHG PACK, HAND: Performed by: ORTHOPAEDIC SURGERY

## 2018-11-20 PROCEDURE — 160002 HCHG RECOVERY MINUTES (STAT): Performed by: ORTHOPAEDIC SURGERY

## 2018-11-20 RX ORDER — MORPHINE SULFATE 10 MG/ML
5 INJECTION, SOLUTION INTRAMUSCULAR; INTRAVENOUS
Status: DISCONTINUED | OUTPATIENT
Start: 2018-11-20 | End: 2018-11-20 | Stop reason: HOSPADM

## 2018-11-20 RX ORDER — HALOPERIDOL 5 MG/ML
1 INJECTION INTRAMUSCULAR
Status: DISCONTINUED | OUTPATIENT
Start: 2018-11-20 | End: 2018-11-20 | Stop reason: HOSPADM

## 2018-11-20 RX ORDER — MORPHINE SULFATE 4 MG/ML
3 INJECTION, SOLUTION INTRAMUSCULAR; INTRAVENOUS
Status: DISCONTINUED | OUTPATIENT
Start: 2018-11-20 | End: 2018-11-20 | Stop reason: HOSPADM

## 2018-11-20 RX ORDER — SCOLOPAMINE TRANSDERMAL SYSTEM 1 MG/1
1 PATCH, EXTENDED RELEASE TRANSDERMAL
Status: DISCONTINUED | OUTPATIENT
Start: 2018-11-20 | End: 2018-11-20 | Stop reason: HOSPADM

## 2018-11-20 RX ORDER — LIDOCAINE HYDROCHLORIDE 10 MG/ML
INJECTION, SOLUTION INFILTRATION; PERINEURAL
Status: COMPLETED
Start: 2018-11-20 | End: 2018-11-20

## 2018-11-20 RX ORDER — BETAMETHASONE SODIUM PHOSPHATE AND BETAMETHASONE ACETATE 3; 3 MG/ML; MG/ML
INJECTION, SUSPENSION INTRA-ARTICULAR; INTRALESIONAL; INTRAMUSCULAR; SOFT TISSUE
Status: DISCONTINUED | OUTPATIENT
Start: 2018-11-20 | End: 2018-11-20 | Stop reason: HOSPADM

## 2018-11-20 RX ORDER — MORPHINE SULFATE 4 MG/ML
2 INJECTION, SOLUTION INTRAMUSCULAR; INTRAVENOUS
Status: DISCONTINUED | OUTPATIENT
Start: 2018-11-20 | End: 2018-11-20 | Stop reason: HOSPADM

## 2018-11-20 RX ORDER — OXYCODONE HYDROCHLORIDE AND ACETAMINOPHEN 5; 325 MG/1; MG/1
2 TABLET ORAL
Status: DISCONTINUED | OUTPATIENT
Start: 2018-11-20 | End: 2018-11-20 | Stop reason: HOSPADM

## 2018-11-20 RX ORDER — KETOROLAC TROMETHAMINE 30 MG/ML
30 INJECTION, SOLUTION INTRAMUSCULAR; INTRAVENOUS EVERY 6 HOURS PRN
Status: DISCONTINUED | OUTPATIENT
Start: 2018-11-20 | End: 2018-11-20 | Stop reason: HOSPADM

## 2018-11-20 RX ORDER — MIDAZOLAM HYDROCHLORIDE 1 MG/ML
1 INJECTION INTRAMUSCULAR; INTRAVENOUS
Status: DISCONTINUED | OUTPATIENT
Start: 2018-11-20 | End: 2018-11-20 | Stop reason: HOSPADM

## 2018-11-20 RX ORDER — MEPERIDINE HYDROCHLORIDE 25 MG/ML
12.5 INJECTION INTRAMUSCULAR; INTRAVENOUS; SUBCUTANEOUS
Status: DISCONTINUED | OUTPATIENT
Start: 2018-11-20 | End: 2018-11-20 | Stop reason: HOSPADM

## 2018-11-20 RX ORDER — OXYCODONE HYDROCHLORIDE AND ACETAMINOPHEN 5; 325 MG/1; MG/1
1 TABLET ORAL
Status: DISCONTINUED | OUTPATIENT
Start: 2018-11-20 | End: 2018-11-20 | Stop reason: HOSPADM

## 2018-11-20 RX ORDER — DIPHENHYDRAMINE HYDROCHLORIDE 50 MG/ML
12.5 INJECTION INTRAMUSCULAR; INTRAVENOUS
Status: DISCONTINUED | OUTPATIENT
Start: 2018-11-20 | End: 2018-11-20 | Stop reason: HOSPADM

## 2018-11-20 RX ORDER — BUPIVACAINE HYDROCHLORIDE 5 MG/ML
INJECTION, SOLUTION EPIDURAL; INTRACAUDAL
Status: DISCONTINUED | OUTPATIENT
Start: 2018-11-20 | End: 2018-11-20 | Stop reason: HOSPADM

## 2018-11-20 RX ORDER — SODIUM CHLORIDE, SODIUM LACTATE, POTASSIUM CHLORIDE, CALCIUM CHLORIDE 600; 310; 30; 20 MG/100ML; MG/100ML; MG/100ML; MG/100ML
INJECTION, SOLUTION INTRAVENOUS CONTINUOUS
Status: DISCONTINUED | OUTPATIENT
Start: 2018-11-20 | End: 2018-11-20 | Stop reason: HOSPADM

## 2018-11-20 RX ORDER — SODIUM CHLORIDE, SODIUM LACTATE, POTASSIUM CHLORIDE, CALCIUM CHLORIDE 600; 310; 30; 20 MG/100ML; MG/100ML; MG/100ML; MG/100ML
INJECTION, SOLUTION INTRAVENOUS ONCE
Status: COMPLETED | OUTPATIENT
Start: 2018-11-20 | End: 2018-11-20

## 2018-11-20 RX ADMIN — Medication 0.5 ML: at 09:10

## 2018-11-20 RX ADMIN — SCOPALAMINE 1 PATCH: 1 PATCH, EXTENDED RELEASE TRANSDERMAL at 14:30

## 2018-11-20 RX ADMIN — SODIUM CHLORIDE, SODIUM LACTATE, POTASSIUM CHLORIDE, CALCIUM CHLORIDE: 600; 310; 30; 20 INJECTION, SOLUTION INTRAVENOUS at 13:10

## 2018-11-20 RX ADMIN — LIDOCAINE HYDROCHLORIDE 0.5 ML: 10 INJECTION, SOLUTION INFILTRATION; PERINEURAL at 09:10

## 2018-11-20 RX ADMIN — HALOPERIDOL LACTATE 1 MG: 5 INJECTION, SOLUTION INTRAMUSCULAR at 11:53

## 2018-11-20 RX ADMIN — SODIUM CHLORIDE, SODIUM LACTATE, POTASSIUM CHLORIDE, CALCIUM CHLORIDE: 600; 310; 30; 20 INJECTION, SOLUTION INTRAVENOUS at 09:10

## 2018-11-20 ASSESSMENT — PAIN SCALES - GENERAL
PAINLEVEL_OUTOF10: 0

## 2018-11-20 NOTE — OR NURSING
"1059: To PACU from OR via [place]ney, drowsy, denies pain/nausea, respirations spontaneous and non-labored. Icepack applied over c/d/i R wrist surgical dressings. RUE elevated on pillows.  1110: No change.  1120: Given ginger ale for \"slight\" nausea. Awaiting surgeon post-op orders.  1125: Pt now trying saltines for nausea. Declines antiemetics, given QueaseEase to inhale.  1130: SpO2 drops to 80's when pt dozes so O2 commenced via n/c.  1140: Pt states nausea slightly better.  1155: Pt agrees to antiemetic as nausea not improving and has small clear liquid emesis.  1207: Dozing intermittently, states nausea \"better\"  1210: O2 d/too   1215: Dozing, SpO2 drops to 80% on RA, O2 restarted.  1225: Pt states she is only very mildly nauseated, O2 d/too for further trial of RA.  1233: SPO2 drops to 88% on RA, O2 restarted, meets criteria to transfer to stage 2 w/O2.  1240: No change.          "

## 2018-11-20 NOTE — DISCHARGE INSTRUCTIONS
ACTIVITY: Rest and take it easy for the first 24 hours.  A responsible adult is recommended to remain with you during that time.  It is normal to feel sleepy.  We encourage you to not do anything that requires balance, judgment or coordination.    MILD FLU-LIKE SYMPTOMS ARE NORMAL. YOU MAY EXPERIENCE GENERALIZED MUSCLE ACHES, THROAT IRRITATION, HEADACHE AND/OR SOME NAUSEA.    FOR 24 HOURS DO NOT:  Drive, operate machinery or run household appliances.  Drink beer or alcoholic beverages.   Make important decisions or sign legal documents.    SPECIAL INSTRUCTIONS: Keep dressing clean and dry   Elevate extremity   May remove dressing 4-5 days and shower   Encourage moving all fingers    DIET: To avoid nausea, slowly advance diet as tolerated, avoiding spicy or greasy foods for the first day.  Add more substantial food to your diet according to your physician's instructions. INCREASE FLUIDS AND FIBER TO AVOID CONSTIPATION.    FOLLOW-UP APPOINTMENT:  A follow-up appointment should be arranged with your doctor; call to schedule.    You should CALL YOUR PHYSICIAN if you develop:  Fever greater than 101 degrees F.  Pain not relieved by medication, or persistent nausea or vomiting.  Excessive bleeding (blood soaking through dressing) or unexpected drainage from the wound.  Extreme redness or swelling around the incision site, drainage of pus or foul smelling drainage.  Inability to urinate or empty your bladder within 8 hours.  Problems with breathing or chest pain.    You should call 911 if you develop problems with breathing or chest pain.  If you are unable to contact your doctor or surgical center, you should go to the nearest emergency room or urgent care center.  Physician's telephone #: [phone]    If any questions arise, call your doctor.  If your doctor is not available, please feel free to call the Surgical Center at [phone].  The Center is open Monday through Friday from 7AM to 7PM.  You can also call the  HEALTH HOTLINE open 24 hours/day, 7 days/week and speak to a nurse at [phone], or toll free at [phone].    A registered nurse may call you a few days after your surgery to see how you are doing after your procedure.    MEDICATIONS: Resume taking daily medication.  Take prescribed pain medication with food.  If no medication is prescribed, you may take non-aspirin pain medication if needed.  PAIN MEDICATION CAN BE VERY CONSTIPATING.  Take a stool softener or laxative such as senokot, pericolace, or milk of magnesia if needed.    Prescription given for Ultram.  Last pain medication given at N/A.    If your physician has prescribed pain medication that includes Acetaminophen (Tylenol), do not take additional Acetaminophen (Tylenol) while taking the prescribed medication.    Depression / Suicide Risk    As you are discharged from this [facility] facility, it is important to learn how to keep safe from harming yourself.    Recognize the warning signs:  · Abrupt changes in personality, positive or negative- including increase in energy   · Giving away possessions  · Change in eating patterns- significant weight changes-  positive or negative  · Change in sleeping patterns- unable to sleep or sleeping all the time   · Unwillingness or inability to communicate  · Depression  · Unusual sadness, discouragement and loneliness  · Talk of wanting to die  · Neglect of personal appearance   · Rebelliousness- reckless behavior  · Withdrawal from people/activities they love  · Confusion- inability to concentrate     If you or a loved one observes any of these behaviors or has concerns about self-harm, here's what you can do:  · Talk about it- your feelings and reasons for harming yourself  · Remove any means that you might use to hurt yourself (examples: pills, rope, extension cords, firearm)  · Get professional help from the community (Mental Health, Substance Abuse, psychological counseling)  · Do not be  alone:Call your Safe Contact- someone whom you trust who will be there for you.  · Call your local CRISIS HOTLINE 474-5934 or 331-895-5211  · Call your local Children's Mobile Crisis Response Team [place] (464) 450-1162 or www.CapLinked  · Call the toll free National Suicide Prevention Hotlines   · National Suicide Prevention Lifeline 019-121-SVDB (1556)  · National [employer] Network 800-SUICIDE (173-0446)

## 2018-11-20 NOTE — OR SURGEON
Immediate Post OP Note    PreOp Diagnosis: R trigger thumb, B/L cmc oa    PostOp Diagnosis: same    Procedure(s):  TRIGGER FINGER RELEASE - THUMB - Wound Class: Clean  JOINT INJECTION DIAGNOSTIC - CMC JOINTS - Wound Class: Clean    Surgeon(s):  Edgar Leblanc M.D.    Anesthesiologist/Type of Anesthesia:  Anesthesiologist: Phil Monroy M.D./General    Surgical Staff:  Circulator: Emile Oscar R.N.; Lluvia Corado R.N.  Scrub Person: Genesis Lopez    Specimens removed if any:  * No specimens in log *    Estimated Blood Loss: min    Findings: adequate release    Complications: none        11/20/2018 11:57 AM Edgar Leblanc M.D.

## 2018-11-20 NOTE — OR NURSING
1246 Report received from JONATHON Macario. Dressing to right hand CD&I. Fingers are warm and pink, pt able to move fingers. Pt is nauseous, per CNA vomited during transfer from recovery room to stage 2, reported feeling better after vomiting.  Pt denies pain. Pt drowsy, still requiring 1 liter of oxygen.  1400 Pt had been observed sleeping off and on up until this time. Pt became nauseous again and vomited.   1430 Scopolamine patch applied to pt.   1515 Pt reports feeling better. Attempting to wean pt to RA, pt still drowsy. Awaiting arrival of pt's daughter.  1600 Discharge instructions reviewed with pt and her daughter.   1620 Pt discharged to the care of her daughter.

## 2018-11-20 NOTE — OP REPORT
DATE OF SERVICE:  11/20/2018    PREOPERATIVE DIAGNOSES:  1.  Right trigger thumb.  2.  Bilateral carpometacarpal joint osteoarthritis.    POSTOPERATIVE DIAGNOSES:  1.  Right trigger thumb.  2.  Bilateral carpometacarpal joint osteoarthritis.    PROCEDURES PERFORMED:  1.  Right trigger thumb release.  2.  Injection of bilateral CMC joints under anesthesia.    SURGEON:  Edgar Leblanc MD    ANESTHESIA:  General.    ESTIMATED BLOOD LOSS:  Minimal.    DRAINS:  None.    COMPLICATIONS:  None.    INDICATIONS:  Patient is a female.  She has 2 reasons for thumb pain, but may   have primarily the trigger thumb.  It starts up to the point where she wants   to do ____ CMC joints.  She would like me to inject them under anesthesia.    Risks, benefits, complications, and alternatives were explained to the   patient.  All questions were answered.  No guarantees were given.  Signed   consent was obtained.    DESCRIPTION OF PROCEDURE:  Patient was taken to the operating room and laid   supine on the operating table.  All bony prominences were well padded.  Good   general was obtained without difficulty.  I then injected both CMC joints each   with 1 mL of Celestone and 0.5 mL lidocaine.  We prepped the right upper   extremity in the usual sterile fashion using ChloraPrep.  Limb was   exsanguinated with elevation, tourniquet raised, total tourniquet time was   under 10 minutes.  I made a transverse incision to the thumb through skin and   subcutaneous tissues, opened and identified the flexor tendon sheath, carried   proximally and distally, found to be a nice release along its entirety.  The   tendon tracked nicely with no catching.  Wound irrigated, closed with nylon in   simple fashion.  Local without epinephrine was injected.  Sterile dressing of   Xeroform, 4x4, Sof-Rol, bias was applied.  All needle and sponge counts   correct.  Patient tolerated the procedure well and was transferred to recovery   room in stable  condition.       ____________________________________     MD JAI WETZEL / RACHAEL    DD:  11/20/2018 12:21:14  DT:  11/20/2018 12:43:01    D#:  [id number]  Job#:  [id number]

## 2018-11-28 ENCOUNTER — OFFICE VISIT (OUTPATIENT)
Dept: URGENT CARE | Facility: PHYSICIAN GROUP | Age: 58
End: 2018-11-28
Payer: COMMERCIAL

## 2018-11-28 VITALS
SYSTOLIC BLOOD PRESSURE: 126 MMHG | OXYGEN SATURATION: 96 % | HEIGHT: 63 IN | HEART RATE: 81 BPM | BODY MASS INDEX: 24.98 KG/M2 | RESPIRATION RATE: 16 BRPM | DIASTOLIC BLOOD PRESSURE: 74 MMHG | WEIGHT: 141 LBS | TEMPERATURE: 99 F

## 2018-11-28 DIAGNOSIS — R42 DIZZINESS: ICD-10-CM

## 2018-11-28 PROCEDURE — 99214 OFFICE O/P EST MOD 30 MIN: CPT | Performed by: EMERGENCY MEDICINE

## 2018-11-28 RX ORDER — MECLIZINE HYDROCHLORIDE 25 MG/1
25 TABLET ORAL 3 TIMES DAILY PRN
Qty: 30 TAB | Refills: 0 | Status: SHIPPED | OUTPATIENT
Start: 2018-11-28 | End: 2019-10-09

## 2018-11-28 RX ORDER — SCOLOPAMINE TRANSDERMAL SYSTEM 1 MG/1
1 PATCH, EXTENDED RELEASE TRANSDERMAL
Qty: 4 PATCH | Refills: 3 | Status: SHIPPED | OUTPATIENT
Start: 2018-11-28 | End: 2019-10-09

## 2018-11-28 RX ORDER — SCOLOPAMINE TRANSDERMAL SYSTEM 1 MG/1
1 PATCH, EXTENDED RELEASE TRANSDERMAL
Qty: 4 PATCH | Refills: 3 | Status: SHIPPED | OUTPATIENT
Start: 2018-11-28 | End: 2019-03-28

## 2018-11-28 RX ORDER — FLUTICASONE PROPIONATE 50 MCG
SPRAY, SUSPENSION (ML) NASAL
Qty: 16 G | Refills: 3 | Status: SHIPPED | OUTPATIENT
Start: 2018-11-28 | End: 2019-07-16 | Stop reason: SDUPTHER

## 2018-11-28 ASSESSMENT — ENCOUNTER SYMPTOMS
NECK PAIN: 0
BACK PAIN: 0
FEVER: 0
DIAPHORESIS: 0
SORE THROAT: 0
HEADACHES: 1
ABDOMINAL PAIN: 0
VOMITING: 0
NERVOUS/ANXIOUS: 0
CHILLS: 0
COUGH: 0
NAUSEA: 0
EYE DISCHARGE: 0
DIZZINESS: 1

## 2018-11-29 NOTE — PROGRESS NOTES
Subjective:      Danii Rodriguez is a 58 y.o. female who presents with Dizziness (since surgery 1 week ago, pressure in ear)            HPI  Patient just had right hand surgery and postop was nauseated for a number of hours requiring Transderm scopolamine.  This surgery occurred approximately 5 days ago and since that time she has had gradual increase in her symptoms associated with left-sided ear pain.  Patient has a long history of ENT problems including currently having 8 to be cane in her left TM.    PMH:  has a past medical history of Anesthesia; High cholesterol; Hypertension; Indigestion; MRSA (methicillin resistant staph aureus) culture positive (2010); Unspecified disorder of thyroid; and Unspecified urinary incontinence.  MEDS:   Current Outpatient Prescriptions:   •  fluticasone (FLONASE) 50 MCG/ACT nasal spray, SPRAY 2 SPRAYS IN NOSE EVERY DAY., Disp: 16 g, Rfl: 3  •  scopolamine (TRANSDERM-SCOP, 1.5 MG,) 1 mg/72hr PATCH 72 HR, Apply 1 Patch to skin as directed every 72 hours., Disp: 4 Patch, Rfl: 3  •  pravastatin (PRAVACHOL) 20 MG Tab, Take 1 Tab by mouth every day for 360 days., Disp: 90 Tab, Rfl: 3  •  coenzyme Q-10 30 MG capsule, Take 60 mg by mouth every day., Disp: , Rfl:   •  Multiple Vitamins-Minerals (WOMENS 50+ MULTI VITAMIN/MIN PO), Take  by mouth., Disp: , Rfl:   •  lisinopril (PRINIVIL) 20 MG Tab, TAKE 1 TABLET BY MOUTH EVERY DAY, Disp: 30 Tab, Rfl: 11  •  TURMERIC PO, Take 1 Tab by mouth every day., Disp: , Rfl:   •  omeprazole (PRILOSEC) 40 MG delayed-release capsule, Take 40 mg by mouth every day., Disp: , Rfl: 5  •  Probiotic Product (PROBIOTIC COLON SUPPORT PO), Take 1 Cap by mouth every day., Disp: , Rfl:   •  Cholecalciferol (VITAMIN D3) 2000 UNIT Cap, Take 4,000 Units by mouth every day., Disp: , Rfl:   •  albuterol 108 (90 Base) MCG/ACT Aero Soln inhalation aerosol, Inhale 2 Puffs by mouth every 6 hours as needed for Shortness of Breath., Disp: , Rfl:   •  aspirin EC 81 MG EC  tablet, Take 1 Tab by mouth every day., Disp: 30 Tab, Rfl:   ALLERGIES:   Allergies   Allergen Reactions   • Ciprofloxacin Hives     Okay to take cipro ear drops, no reaction with ear drop   • Latex Rash     Tape burns and rash- paper tape OK   • Other Environmental      seasonal   • Sulfa Drugs Itching     Not had but was told not to use   • Vicodin [Hydrocodone-Acetaminophen] Rash     Rash      SURGHX:   Past Surgical History:   Procedure Laterality Date   • TRIGGER FINGER RELEASE Right 11/20/2018    Procedure: TRIGGER FINGER RELEASE - THUMB;  Surgeon: Edgar Leblanc M.D.;  Location: [facility];  Service: Orthopedics   • JOINT INJECTION DIAGNOSTIC Bilateral 11/20/2018    Procedure: JOINT INJECTION DIAGNOSTIC - CMC JOINTS;  Surgeon: Edgar Leblanc M.D.;  Location: [facility];  Service: Orthopedics   • CARPAL TUNNEL ENDOSCOPIC  9/30/2014    Both side Performed by Edgar Leblanc M.D. at [facility]   • ABDOMINAL HYSTERECTOMY TOTAL      Hysterectomy and removal Ovaries and tubes, Total Abdominal in 2000   • BONE GRAFT      bone graft surgery on left second toe in 2002 due to fracture   • CYSTOSCOPY      Removal of polyps from urinary bladder and dilation of urethra in 2000   • INCISION AND DRAINAGE ENT      I&D of left ear for ear infection twice in 2010 and 2011 by Dr Kahn, ENT   • NASAL SEPTAL RECONSTRUCTION      2005     SOCHX:  reports that she quit smoking about 42 years ago. She has never used smokeless tobacco. She reports that she drinks about 3.6 oz of alcohol per week . She reports that she does not use drugs.  FH: family history includes Bipolar disorder in her mother and sister; Cancer in her paternal aunt and paternal grandmother; Diabetes in her father; Heart Disease in her father and paternal grandmother; Hypertension in her father and mother; Other in her mother, paternal grandmother, and sister; Stroke in her sister; Thyroid in her mother.  Review of  "Systems   Constitutional: Negative for chills, diaphoresis, fever and malaise/fatigue.   HENT: Positive for ear pain. Negative for sore throat.    Eyes: Negative for discharge.   Respiratory: Negative for cough.    Cardiovascular: Negative for chest pain.   Gastrointestinal: Negative for abdominal pain, nausea and vomiting.   Genitourinary: Negative.    Musculoskeletal: Negative for back pain and neck pain.   Skin: Negative for rash.   Neurological: Positive for dizziness and headaches.   Psychiatric/Behavioral: The patient is not nervous/anxious.           Objective:     /74   Pulse 81   Temp 37.2 °C (99 °F)   Resp 16   Ht 1.6 m (5' 3\")   Wt 64 kg (141 lb)   SpO2 96%   BMI 24.98 kg/m²      Physical Exam   Constitutional: She appears well-nourished. No distress.   HENT:   Head: Normocephalic and atraumatic.   Right Ear: External ear normal.   TMs are normal on the right with a retained PE tube on the left.  No erythema on either tympanic membrane.   Eyes: Pupils are equal, round, and reactive to light. Conjunctivae and EOM are normal.   Neck: No tracheal deviation present.   Pulmonary/Chest: Effort normal and breath sounds normal.   Abdominal: She exhibits no distension.   Musculoskeletal: Normal range of motion. She exhibits no edema or deformity.   Neurological: Coordination normal.   Skin: Skin is warm and dry. She is not diaphoretic. No erythema. No pallor.   Psychiatric: She has a normal mood and affect. Her behavior is normal.   Nursing note and vitals reviewed.              Assessment/Plan:     1. Dizziness          I am recommending the patient initiate/ continue hydration efforts including the use of a vaporizer/humidifier/ netti pot. I also recommend the pt, initiate Mucinex  and Sudafed.. In addition the patient will initiate the prescribed prescription medication/s: Patient will be given Transderm scopolamine which she will use on her mastoid area 1/2-1 patch for 3 hours.  Is also been " referred to Dr. Alyx Kahn due to her previous surgery.  If the patient's condition exacerbates with worsening dysphagia, shortness of breath, uncontrolled fever, headache or chest pressure he/she will return immediately to the urgent care or go to  the emergency department for further evaluation.    Toby Coyne        - scopolamine (TRANSDERM-SCOP, 1.5 MG,) 1 mg/72hr PATCH 72 HR; Apply 1 Patch to skin as directed every 72 hours.  Dispense: 4 Patch; Refill: 3  - REFERRAL TO ENT

## 2018-12-11 ENCOUNTER — OFFICE VISIT (OUTPATIENT)
Dept: URGENT CARE | Facility: PHYSICIAN GROUP | Age: 58
End: 2018-12-11
Payer: COMMERCIAL

## 2018-12-11 VITALS
BODY MASS INDEX: 24.27 KG/M2 | WEIGHT: 137 LBS | TEMPERATURE: 98.3 F | SYSTOLIC BLOOD PRESSURE: 130 MMHG | OXYGEN SATURATION: 98 % | HEIGHT: 63 IN | DIASTOLIC BLOOD PRESSURE: 68 MMHG | HEART RATE: 62 BPM

## 2018-12-11 DIAGNOSIS — R11.0 NAUSEA: ICD-10-CM

## 2018-12-11 DIAGNOSIS — R42 DIZZINESS: ICD-10-CM

## 2018-12-11 PROCEDURE — 99214 OFFICE O/P EST MOD 30 MIN: CPT | Performed by: PHYSICIAN ASSISTANT

## 2018-12-11 RX ORDER — ONDANSETRON 4 MG/1
4 TABLET, FILM COATED ORAL EVERY 4 HOURS PRN
Qty: 30 TAB | Refills: 1 | Status: SHIPPED | OUTPATIENT
Start: 2018-12-11 | End: 2019-10-09

## 2018-12-11 ASSESSMENT — ENCOUNTER SYMPTOMS
SORE THROAT: 0
BLURRED VISION: 0
SHORTNESS OF BREATH: 0
EYE PAIN: 0
CHILLS: 0
DIARRHEA: 0
VOMITING: 0
PALPITATIONS: 0
DIZZINESS: 1
MYALGIAS: 0
CONSTIPATION: 0
FEVER: 0
ABDOMINAL PAIN: 0
NAUSEA: 1
COUGH: 0
NERVOUS/ANXIOUS: 0
HEADACHES: 1

## 2018-12-11 NOTE — LETTER
December 11, 2018         Patient: Danii Rodriguez   YOB: 1960   Date of Visit: 12/11/2018           To Whom it May Concern:    Danii Rodriguez was seen in my clinic on 12/11/2018. She is being seen for vertigo. We are placing a new referral to ENT so that she can hopefully be seen by them in the next 2 weeks for evaluation of her symptoms.    If you have any questions or concerns, please don't hesitate to call.        Sincerely,           Bethany Velázquez P.A.-C.  Electronically Signed

## 2018-12-12 NOTE — PROGRESS NOTES
Subjective:      Danii Rodriguez is a 58 y.o. female who presents with Dizziness (x3 weeks, Nausea, L ear pain, pressure in L side of face )      HPI:  Danii Rodriguez is a 58 y.o. female who presents for evaluation of dizziness and nausea.  Patient was initially seen in urgent care setting on 11/28/2018 for the symptoms.  She had surgery on her hand on 11/20/2018 to repair trigger finger at that time she was dizzy and nauseated for hours after and required Transderm scopolamine for symptom relief.  Since that time she says that the dizziness and nausea have never resolved.  She was prescribed some Transderm scopolamine when she was here on the 28th and she does say that using that alleviates her dizziness by 80-90%.  She says, however, that does not help with her nausea at all.  She had surgery on her mastoid many years ago and had a referral placed to ENT to see this doctor but states that she was unable to get an appointment until mid January.  She does not feel like she can wait that long so she came back to the urgent care setting today for reevaluation.  She also reports that she has had some pain/fullness in her left ear.  She denies chest pain, shortness of breath, or vomiting        Review of Systems   Constitutional: Negative for chills and fever.   HENT: Positive for ear pain. Negative for congestion and sore throat.    Eyes: Negative for blurred vision and pain.   Respiratory: Negative for cough and shortness of breath.    Cardiovascular: Negative for chest pain and palpitations.   Gastrointestinal: Positive for nausea. Negative for abdominal pain, constipation, diarrhea and vomiting.   Musculoskeletal: Negative for myalgias.   Skin: Negative for rash.   Neurological: Positive for dizziness and headaches.   Psychiatric/Behavioral: The patient is not nervous/anxious.        PMH:  has a past medical history of Anesthesia; High cholesterol; Hypertension; Indigestion; MRSA (methicillin resistant staph  aureus) culture positive (2010); Unspecified disorder of thyroid; and Unspecified urinary incontinence.  MEDS:   Current Outpatient Prescriptions:   •  ondansetron (ZOFRAN) 4 MG Tab tablet, Take 1 Tab by mouth every four hours as needed for Nausea/Vomiting., Disp: 30 Tab, Rfl: 1  •  scopolamine (TRANSDERM-SCOP, 1.5 MG,) 1 mg/72hr PATCH 72 HR, Apply 1 Patch to skin as directed every 72 hours., Disp: 4 Patch, Rfl: 3  •  meclizine (ANTIVERT) 25 MG Tab, Take 1 Tab by mouth 3 times a day as needed., Disp: 30 Tab, Rfl: 0  •  scopolamine (TRANSDERM-SCOP) 1 mg/72hr PATCH 72 HR, Apply 1 Patch to skin as directed every 72 hours., Disp: 4 Patch, Rfl: 3  •  albuterol 108 (90 Base) MCG/ACT Aero Soln inhalation aerosol, Inhale 2 Puffs by mouth every 6 hours as needed for Shortness of Breath., Disp: , Rfl:   •  aspirin EC 81 MG EC tablet, Take 1 Tab by mouth every day., Disp: 30 Tab, Rfl:   •  fluticasone (FLONASE) 50 MCG/ACT nasal spray, SPRAY 2 SPRAYS IN NOSE EVERY DAY., Disp: 16 g, Rfl: 3  •  pravastatin (PRAVACHOL) 20 MG Tab, Take 1 Tab by mouth every day for 360 days., Disp: 90 Tab, Rfl: 3  •  coenzyme Q-10 30 MG capsule, Take 60 mg by mouth every day., Disp: , Rfl:   •  Multiple Vitamins-Minerals (WOMENS 50+ MULTI VITAMIN/MIN PO), Take  by mouth., Disp: , Rfl:   •  lisinopril (PRINIVIL) 20 MG Tab, TAKE 1 TABLET BY MOUTH EVERY DAY, Disp: 30 Tab, Rfl: 11  •  TURMERIC PO, Take 1 Tab by mouth every day., Disp: , Rfl:   •  omeprazole (PRILOSEC) 40 MG delayed-release capsule, Take 40 mg by mouth every day., Disp: , Rfl: 5  •  Probiotic Product (PROBIOTIC COLON SUPPORT PO), Take 1 Cap by mouth every day., Disp: , Rfl:   •  Cholecalciferol (VITAMIN D3) 2000 UNIT Cap, Take 4,000 Units by mouth every day., Disp: , Rfl:   ALLERGIES:   Allergies   Allergen Reactions   • Ciprofloxacin Hives     Okay to take cipro ear drops, no reaction with ear drop   • Latex Rash     Tape burns and rash- paper tape OK   • Other Environmental       "seasonal   • Sulfa Drugs Itching     Not had but was told not to use   • Vicodin [Hydrocodone-Acetaminophen] Rash     Rash      SURGHX:   Past Surgical History:   Procedure Laterality Date   • TRIGGER FINGER RELEASE Right 11/20/2018    Procedure: TRIGGER FINGER RELEASE - THUMB;  Surgeon: Edgar Leblanc M.D.;  Location: [facility];  Service: Orthopedics   • JOINT INJECTION DIAGNOSTIC Bilateral 11/20/2018    Procedure: JOINT INJECTION DIAGNOSTIC - [facility] JOINTS;  Surgeon: Edgar Leblanc M.D.;  Location: SUR[facility]ERY [facility];  Service: Orthopedics   • CARPAL TUNNEL ENDOSCOPIC  9/30/2014    Both side Performed by Edgar Leblanc M.D. at [facility]   • ABDOMINAL HYSTERECTOMY TOTAL      Hysterectomy and removal Ovaries and tubes, Total Abdominal in 2000   • BONE GRAFT      bone graft surgery on left second toe in 2002 due to fracture   • CYSTOSCOPY      Removal of polyps from urinary bladder and dilation of urethra in 2000   • INCISION AND DRAINAGE ENT      I&D of left ear for ear infection twice in 2010 and 2011 by Dr Kahn, ENT   • NASAL SEPTAL RECONSTRUCTION      2005     SOCHX:  reports that she quit smoking about 42 years ago. She has never used smokeless tobacco. She reports that she drinks about 3.6 oz of alcohol per week . She reports that she does not use drugs.  FH: Family history was reviewed, no pertinent findings to report     Objective:     /68 (BP Location: Right arm, Patient Position: Sitting, BP Cuff Size: Adult)   Pulse 62   Temp 36.8 °C (98.3 °F) (Temporal)   Ht 1.6 m (5' 3\")   Wt 62.1 kg (137 lb)   SpO2 98%   BMI 24.27 kg/m²        Physical Exam   Constitutional: She is oriented to person, place, and time. She appears well-developed and well-nourished.   HENT:   Head: Normocephalic and atraumatic.   Right Ear: Tympanic membrane, external ear and ear canal normal.   Left Ear: External ear and ear canal normal.   Nose: Nose normal.   Mouth/Throat: Uvula is " midline, oropharynx is clear and moist and mucous membranes are normal.   Retained, blue PE tube seen behind left TM   Eyes: Pupils are equal, round, and reactive to light. Conjunctivae are normal.   Neck: Normal range of motion.   Cardiovascular: Normal rate, regular rhythm and normal heart sounds.    No murmur heard.  Pulmonary/Chest: Effort normal and breath sounds normal. She has no wheezes.   Lymphadenopathy:     She has no cervical adenopathy.   Neurological: She is alert and oriented to person, place, and time. She has normal reflexes. No cranial nerve deficit or sensory deficit. She displays a negative Romberg sign. GCS eye subscore is 4. GCS verbal subscore is 5. GCS motor subscore is 6.   Skin: Skin is warm and dry. Capillary refill takes less than 2 seconds.   Psychiatric: She has a normal mood and affect. Her behavior is normal. Judgment normal.          Assessment/Plan:     1. Dizziness  - ondansetron (ZOFRAN) 4 MG Tab tablet; Take 1 Tab by mouth every four hours as needed for Nausea/Vomiting.  Dispense: 30 Tab; Refill: 1  - REFERRAL TO ENT  - Continue use of scopolamine patches    2. Nausea  - ondansetron (ZOFRAN) 4 MG Tab tablet; Take 1 Tab by mouth every four hours as needed for Nausea/Vomiting.  Dispense: 30 Tab; Refill: 1  - REFERRAL TO ENT      *Advised patient to give me a call on Friday if she has not heard anything from the referral center    Differential Diagnosis, natural history, and supportive care discussed. Return to the Urgent Care or follow up with your PCP if symptoms fail to resolve, or for any new or worsening symptoms. Emergency room precautions discussed. Patient and/or family appears understanding of information.

## 2018-12-13 ENCOUNTER — TELEPHONE (OUTPATIENT)
Dept: MEDICAL GROUP | Age: 58
End: 2018-12-13

## 2018-12-13 NOTE — TELEPHONE ENCOUNTER
Patient left voicemail she has been spinning and thinks it has something to do with her ear. She states that she has been nauseas and would like a medication for nauseas so she can eat.    Patient saw another provider on 12/11/2018.    FYI.

## 2018-12-13 NOTE — TELEPHONE ENCOUNTER
Patient was seen in urgent care on 12/11/18 and she received Zofran for her nausea and dizziness.  She was referred to ENT by urgent care provider.    Juany Ralph M.D.

## 2018-12-17 ENCOUNTER — HOSPITAL ENCOUNTER (OUTPATIENT)
Dept: RADIOLOGY | Facility: MEDICAL CENTER | Age: 58
End: 2018-12-17
Attending: INTERNAL MEDICINE
Payer: COMMERCIAL

## 2018-12-17 DIAGNOSIS — Z12.31 VISIT FOR SCREENING MAMMOGRAM: ICD-10-CM

## 2018-12-17 PROCEDURE — 77067 SCR MAMMO BI INCL CAD: CPT

## 2019-02-03 ENCOUNTER — HOSPITAL ENCOUNTER (OUTPATIENT)
Dept: RADIOLOGY | Facility: MEDICAL CENTER | Age: 59
End: 2019-02-03
Attending: OTOLARYNGOLOGY
Payer: COMMERCIAL

## 2019-02-03 DIAGNOSIS — H92.02 LEFT EAR PAIN: ICD-10-CM

## 2019-02-03 DIAGNOSIS — H69.92 DYSFUNCTION OF LEFT EUSTACHIAN TUBE: ICD-10-CM

## 2019-02-03 PROCEDURE — 70480 CT ORBIT/EAR/FOSSA W/O DYE: CPT

## 2019-03-26 ENCOUNTER — HOSPITAL ENCOUNTER (OUTPATIENT)
Dept: LAB | Facility: MEDICAL CENTER | Age: 59
End: 2019-03-26
Attending: FAMILY MEDICINE
Payer: COMMERCIAL

## 2019-03-26 DIAGNOSIS — E78.00 PURE HYPERCHOLESTEROLEMIA: ICD-10-CM

## 2019-03-26 LAB
ALBUMIN SERPL BCP-MCNC: 4.6 G/DL (ref 3.2–4.9)
ALBUMIN/GLOB SERPL: 1.8 G/DL
ALP SERPL-CCNC: 49 U/L (ref 30–99)
ALT SERPL-CCNC: 20 U/L (ref 2–50)
ANION GAP SERPL CALC-SCNC: 9 MMOL/L (ref 0–11.9)
AST SERPL-CCNC: 22 U/L (ref 12–45)
BILIRUB SERPL-MCNC: 0.3 MG/DL (ref 0.1–1.5)
BUN SERPL-MCNC: 15 MG/DL (ref 8–22)
CALCIUM SERPL-MCNC: 9.5 MG/DL (ref 8.5–10.5)
CHLORIDE SERPL-SCNC: 107 MMOL/L (ref 96–112)
CHOLEST SERPL-MCNC: 218 MG/DL (ref 100–199)
CO2 SERPL-SCNC: 24 MMOL/L (ref 20–33)
CREAT SERPL-MCNC: 0.74 MG/DL (ref 0.5–1.4)
FASTING STATUS PATIENT QL REPORTED: NORMAL
GLOBULIN SER CALC-MCNC: 2.5 G/DL (ref 1.9–3.5)
GLUCOSE SERPL-MCNC: 86 MG/DL (ref 65–99)
HDLC SERPL-MCNC: 61 MG/DL
LDLC SERPL CALC-MCNC: 125 MG/DL
POTASSIUM SERPL-SCNC: 4.3 MMOL/L (ref 3.6–5.5)
PROT SERPL-MCNC: 7.1 G/DL (ref 6–8.2)
SODIUM SERPL-SCNC: 140 MMOL/L (ref 135–145)
TRIGL SERPL-MCNC: 159 MG/DL (ref 0–149)

## 2019-03-26 PROCEDURE — 36415 COLL VENOUS BLD VENIPUNCTURE: CPT

## 2019-03-26 PROCEDURE — 80061 LIPID PANEL: CPT

## 2019-03-26 PROCEDURE — 80053 COMPREHEN METABOLIC PANEL: CPT

## 2019-03-28 ENCOUNTER — OFFICE VISIT (OUTPATIENT)
Dept: MEDICAL GROUP | Age: 59
End: 2019-03-28
Payer: COMMERCIAL

## 2019-03-28 VITALS
TEMPERATURE: 98.3 F | HEART RATE: 73 BPM | WEIGHT: 140 LBS | OXYGEN SATURATION: 100 % | DIASTOLIC BLOOD PRESSURE: 64 MMHG | SYSTOLIC BLOOD PRESSURE: 122 MMHG | HEIGHT: 63 IN | BODY MASS INDEX: 24.8 KG/M2

## 2019-03-28 DIAGNOSIS — Z23 NEED FOR VACCINATION: ICD-10-CM

## 2019-03-28 DIAGNOSIS — E55.9 VITAMIN D INSUFFICIENCY: ICD-10-CM

## 2019-03-28 DIAGNOSIS — M19.042 PRIMARY OSTEOARTHRITIS OF BOTH HANDS: ICD-10-CM

## 2019-03-28 DIAGNOSIS — E78.00 PURE HYPERCHOLESTEROLEMIA: ICD-10-CM

## 2019-03-28 DIAGNOSIS — M19.041 PRIMARY OSTEOARTHRITIS OF BOTH HANDS: ICD-10-CM

## 2019-03-28 DIAGNOSIS — H93.8X2 PRESSURE SENSATION IN LEFT EAR: ICD-10-CM

## 2019-03-28 DIAGNOSIS — I10 ESSENTIAL HYPERTENSION: ICD-10-CM

## 2019-03-28 PROBLEM — M65.311 TRIGGER THUMB, RIGHT THUMB: Status: RESOLVED | Noted: 2018-11-20 | Resolved: 2019-03-28

## 2019-03-28 PROCEDURE — 90471 IMMUNIZATION ADMIN: CPT | Performed by: INTERNAL MEDICINE

## 2019-03-28 PROCEDURE — 90686 IIV4 VACC NO PRSV 0.5 ML IM: CPT | Performed by: INTERNAL MEDICINE

## 2019-03-28 PROCEDURE — 99214 OFFICE O/P EST MOD 30 MIN: CPT | Mod: 25 | Performed by: INTERNAL MEDICINE

## 2019-03-28 ASSESSMENT — PATIENT HEALTH QUESTIONNAIRE - PHQ9: CLINICAL INTERPRETATION OF PHQ2 SCORE: 0

## 2019-03-28 NOTE — PROGRESS NOTES
Subjective:   Danii Rodriguez is a 58 y.o. female here today for evaluation and management of:    Vitamin D insufficiency  Chronic. Patient is currently taking vitamin D 4000 units daily. I discussed the blood test with the patient in clinic today which shows vitamin D level within normal limits.    Results for DANII RODRIGUEZ (MRN [id number]) as of 3/28/2019 08:50   Ref. Range 1/20/2018 08:04 6/5/2018 07:27 6/26/2018 11:39 10/5/2018 11:47   25-Hydroxy   Vitamin D 25 Latest Ref Range: 30 - 100 ng/mL 27 (L)  45 60       Pure hypercholesterolemia  Chronic. In the past, patient was unable to tolerate atorvastatin and rosuvastatin due to side effects of tiredness, fatigue, and muscle aches. She has been following with vascular medicine, Dr. Neal for cholesterol and blood pressure control. Dr. Neal started her on pravastatin. They started her on a low dose and have been slowly increasing the dose. She is currently on 20 mg Pravastatin which she takes everyday. States she has been feeling well so far on this dose. She has upcoming follow up with vascular soon. Denies any active symptoms at this time. Patient mentions she is taking a baby aspirin daily. I discussed the blood test with the patient in clinic today which shows improved cholesterol levels.    Results for DANII RODRIGUEZ (MRN [id number]) as of 3/28/2019 08:50   Ref. Range 10/5/2018 11:47 10/10/2018 10:09 3/26/2019 07:34   Cholesterol,Tot Latest Ref Range: 100 - 199 mg/dL 247 (H) 248 (H) 218 (H)   Triglycerides Latest Ref Range: 0 - 149 mg/dL 88 92 159 (H)   HDL Latest Ref Range: >=40 mg/dL 76 74 (H) 61   LDL Latest Ref Range: <100 mg/dL 153 (H)  125 (H)       Essential hypertension   Chronic. Patient has been compliant with taking her lisinopril 20 mg once daily. She denies any medication side effects or any associated symptoms regarding hypertension at this time. Patient's blood pressure is well controlled on this.    Primary osteoarthritis of  both hands  Chronic. Patient is following with Dr. Leblanc (ortho). Patient states she had a right hand tendon release surgery done in the past by Dr. Leblanc. She is currently getting cortisone shots intermittently. Patient has been taking 400 mg Advil and turmeric daily which has been helping with the pain. However, the arthritis pain is still present. 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 She denies any major injuries in the past. She otherwise denies any other significant pain elsewhere on her body. Patient notes that she was previously on meclizine and zofran for vertigo that occurred after the prior hand surgery. They were unable to figure out why she was having the vertigo. She was put on a patch at one point which worked faster for the dizziness. States the vertigo has resolved, and she is currently no longer taking the meclizine or zofran. However, she would like to keep these medications on her list in case she has to get surgery again.     Pressure sensation in left ear, left temporal area and left TM joint  Chronic. Patient has chronic intermittent left TMJ tension and pain. She follows with Dr. Ascencio (dentist). Patient states she has been getting tension headaches over the last 3 months which she wakes up with. The headaches take a couple of hours to resolve. She followed up with Dr. Ascencio who informed her this was possibly due to her tongue going back and causing an inadequate airway when she sleeps at night. Patient will be getting two mouth guards next week to help maintain a proper airway. She was advised to return to Dr. Ascencio if her headaches do not resolve with the mouth guards at which point they will discuss getting a sleep study. Patient has been trying not to sleep on her back.      Current medicines (including changes today)  Current Outpatient Prescriptions   Medication Sig Dispense Refill   • Diclofenac Sodium 1 % Gel Apply  "2 gram on upper extremities twice a day as needed. 1 Tube 1   • ondansetron (ZOFRAN) 4 MG Tab tablet Take 1 Tab by mouth every four hours as needed for Nausea/Vomiting. 30 Tab 1   • fluticasone (FLONASE) 50 MCG/ACT nasal spray SPRAY 2 SPRAYS IN NOSE EVERY DAY. 16 g 3   • meclizine (ANTIVERT) 25 MG Tab Take 1 Tab by mouth 3 times a day as needed. 30 Tab 0   • scopolamine (TRANSDERM-SCOP) 1 mg/72hr PATCH 72 HR Apply 1 Patch to skin as directed every 72 hours. 4 Patch 3   • pravastatin (PRAVACHOL) 20 MG Tab Take 1 Tab by mouth every day for 360 days. 90 Tab 3   • coenzyme Q-10 30 MG capsule Take 60 mg by mouth every day.     • Multiple Vitamins-Minerals (WOMENS 50+ MULTI VITAMIN/MIN PO) Take  by mouth.     • lisinopril (PRINIVIL) 20 MG Tab TAKE 1 TABLET BY MOUTH EVERY DAY 30 Tab 11   • albuterol 108 (90 Base) MCG/ACT Aero Soln inhalation aerosol Inhale 2 Puffs by mouth every 6 hours as needed for Shortness of Breath.     • aspirin EC 81 MG EC tablet Take 1 Tab by mouth every day. 30 Tab    • TURMERIC PO Take 1 Tab by mouth every day.     • omeprazole (PRILOSEC) 40 MG delayed-release capsule Take 40 mg by mouth every day.  5   • Probiotic Product (PROBIOTIC COLON SUPPORT PO) Take 1 Cap by mouth every day.     • Cholecalciferol (VITAMIN D3) 2000 UNIT Cap Take 4,000 Units by mouth every day.       No current facility-administered medications for this visit.      She  has a past medical history of Anesthesia; High cholesterol; Hypertension; Indigestion; MRSA (methicillin resistant staph aureus) culture positive (2010); Unspecified disorder of thyroid; and Unspecified urinary incontinence.    ROS   No chest pain, no shortness of breath, no abdominal pain       Objective:     Blood pressure 122/64, pulse 73, temperature 36.8 °C (98.3 °F), temperature source Temporal, height 1.6 m (5' 3\"), weight 63.5 kg (140 lb), SpO2 100 %, not currently breastfeeding. Body mass index is 24.8 kg/m².   Physical Exam:  General: Alert, " oriented and no acute distress.  Eye contact is good, speech goal directed, affect calm  HEENT: conjunctiva non-injected, sclera non-icteric.  Oral mucous membranes pink and moist with no lesions.  Pinna normal.   Lungs: Normal respiratory effort, clear to auscultation bilaterally with good excursion.  CV: regular rate and rhythm. No murmurs. No carotid bruits.  Abdomen: soft, non distended, nontender, Bowel sound normal.  Ext: no edema, color normal, vascularity normal, temperature normal.   Musculoskeletal exam: Tenderness to palpation of MC metacarpal joint of bilateral thumbs.     Assessment and Plan:   The following treatment plan was discussed     1. Vitamin D insufficiency  - Last labs showed vitamin D levels with normal limits. Continue same regimen, 4000 units vitamin D daily.   - VITAMIN D,25 HYDROXY; Future    2. Pure hypercholesterolemia  - Last labs showed improved cholesterol levels.  - She will continue Pravastatin 20 mg once daily. She will continue current plan of care and follow ups with vascular medicine.  - Recheck lab 1-2 weeks before next follow up visit.  - Reviewed the risks and benefits of treatment and potential side effects of medication.  - Advised to eat low fat, low carbohydrate and high fiber diet as well as do cardio physical exercise regularly.    - Lipid Profile; Future    3. Essential hypertension  - Well-controlled. Continue current regimens, lisinopril 20 mg. Recheck lab 1-2 weeks before next follow up visit.  - Reviewed the risks and benefits as well as potential side effects of medications with patient.  - Discussed to eat low-sodium diet and encouraged to do regular physical exercise.  - Recommend to monitor blood pressure and heart rate at home.   - CBC WITH DIFFERENTIAL; Future  - Comp Metabolic Panel; Future    4. Primary osteoarthritis of both hands  - Prescribing diclofenac to be used as needed.  Reviewed the use and side effects of diclofenac gel with patient.  - Patient  will continue current plan of care and follow ups with Dr. Leblanc ([facility]). She is currently undergoing cortisone shots by Dr. Leblanc.  - CBC WITH DIFFERENTIAL; Future  - Comp Metabolic Panel; Future  - Diclofenac Sodium 1 % Gel; Apply 2 gram on upper extremities twice a day as needed.  Dispense: 1 Tube; Refill: 1    5. Pressure sensation in left ear, left temporal area and left TM joint  - Continue follow up and current plan of care with Dr. Ascencio (dentist). She will be getting mouth guards. She is to return to Dr. Ascencio in June or July this year if her headaches persist at which point they will discuss getting a sleep study  - Discussed stretches and exercises to help with her pain.    6. Need for vaccination  - Influenza Vaccine Quad Injection >3Y (PF)      Health Maintenance   - Patient will get influenza vaccine today.  - She is due for shingles vaccination. However, she will not get this today as we do not have this vaccination in clinic at this time.    Followup: Return in about 6 months (around 9/28/2019), or if symptoms worsen or fail to improve, for Hyperlipidemia, Hypertension, Arthritis, Vitamin D insufficiency, Lab review.      Please note that this dictation was created using voice recognition software. I have made every reasonable attempt to correct obvious errors, but I expect that there may have unintended errors in text, spelling, punctuation, or grammar that I did not discover.    I, Navneet Wenier (Scribe), am scribing for, and in the presence of, Juany Ralph M.D..    Electronically signed by: Navneet Weiner (Jeyson), 3/28/2019    I, Juany Ralph M.D., personally performed the services described in this documentation, as scribed by Navneet Weiner in my presence, and it is both accurate and complete.

## 2019-04-18 ENCOUNTER — OFFICE VISIT (OUTPATIENT)
Dept: VASCULAR LAB | Facility: MEDICAL CENTER | Age: 59
End: 2019-04-18
Attending: FAMILY MEDICINE
Payer: COMMERCIAL

## 2019-04-18 VITALS
HEIGHT: 63 IN | DIASTOLIC BLOOD PRESSURE: 60 MMHG | HEART RATE: 71 BPM | SYSTOLIC BLOOD PRESSURE: 135 MMHG | BODY MASS INDEX: 24.8 KG/M2 | WEIGHT: 140 LBS

## 2019-04-18 DIAGNOSIS — E55.9 VITAMIN D INSUFFICIENCY: ICD-10-CM

## 2019-04-18 DIAGNOSIS — I10 ESSENTIAL HYPERTENSION: ICD-10-CM

## 2019-04-18 DIAGNOSIS — E78.00 PURE HYPERCHOLESTEROLEMIA: ICD-10-CM

## 2019-04-18 PROCEDURE — 99214 OFFICE O/P EST MOD 30 MIN: CPT | Performed by: FAMILY MEDICINE

## 2019-04-18 PROCEDURE — 99212 OFFICE O/P EST SF 10 MIN: CPT | Performed by: FAMILY MEDICINE

## 2019-04-18 RX ORDER — PRAVASTATIN SODIUM 40 MG
40 TABLET ORAL DAILY
Qty: 90 TAB | Refills: 3 | Status: SHIPPED | OUTPATIENT
Start: 2019-04-18 | End: 2019-06-19

## 2019-04-18 ASSESSMENT — ENCOUNTER SYMPTOMS
PALPITATIONS: 0
DIZZINESS: 0
INSOMNIA: 0
HEMOPTYSIS: 0
ABDOMINAL PAIN: 0
BRUISES/BLEEDS EASILY: 0
DOUBLE VISION: 0
FEVER: 0
BLURRED VISION: 0
WHEEZING: 0
NAUSEA: 0
DEPRESSION: 0
SORE THROAT: 0
NERVOUS/ANXIOUS: 0
SHORTNESS OF BREATH: 0
HEADACHES: 0
ORTHOPNEA: 0
VOMITING: 0
DIARRHEA: 0
WEAKNESS: 0
MYALGIAS: 0
COUGH: 0
TREMORS: 0
SEIZURES: 0
CHILLS: 0
FOCAL WEAKNESS: 0
BLOOD IN STOOL: 0

## 2019-04-18 NOTE — PATIENT INSTRUCTIONS
"1) start cholest-off  2) increase pravastatin to 40mg     General healthly nutrition advice:  - the USDA food pattern (https://www.cnpp.usda.gov/USDAFoodPatterns)  - plate method (https://www.choosemyplate.gov/)  - consume diet that emphasizes intake of vegetables, fruits, and whole grains,  - use low fat diary products, poultry, fish, legumes, nontropical vegetable oils, nuts  - limit intake of sweets, sugar-sweetned beverages, and red meats   - reduce saturated and trans fats to <6% of your daily calories   - consume no more than 2,400mg of sodium daily (look at food labels) or if you have high BP then reduce to no more than 1,500mg of sodium daily     BP lowering diet:   - DASH diet (https://www.heart.org/en/health-topics/high-blood-pressure/changes-you-can-make-to-manage-high-blood-pressure/managing-blood-pressure-with-a-heart-healthy-diet)    Diabetes diet:  - visit diabetes.org to review \"food and fitness\" section and \"Create your plate\" for plate method education (http://www.diabetes.org/food-and-fitness/)     Cholesterol-reducing diets:   - AHA diet  (http://www.heart.org/en/healthy-living/healthy-eating/eat-smart/nutrition-basics/aha-diet-and-lifestyle-recommendations)   - Mediterranean diet (http://www.heart.org/en/healthy-living/healthy-eating/eat-smart/nutrition-basics/mediterranean-diet)   - lowering triglycerides: (http://my.americanheart.org/idc/groups/ahamah-public/@wcm/@sop/@[facility]/documents/downloadable/[facility]_425988.pdf)     - engage in moderate to vigorous physical activity such as brisk walking, swimming, cycling, >150 minutes per week at 30-40 minutes per session, 3 to 5 times weekly or as directed at today's visit     "

## 2019-04-18 NOTE — PROGRESS NOTES
FOLLOW-UP VASCULAR MED VISIT  Subjective:   Danii Rodriguez is a 57 y.o. female who presents today 19 for   Chief Complaint   Patient presents with   • Follow-Up     Pure Hypercholesterolemia   Referred by Dr. Ralph (PCP) for evaluation and mgmt of HLD in context of prior statin intolerance    HPI:     Danii Rodriguez had been placed on rosuvastatin 10mg daily due to HLD.  Last labs reviewed and noted to have LDL = 144.  Has been unable to control with TLC measures.   Had been on lipitor since 2018.     Current HTN concerns: Denies   HTN sx:  No current blurred or changed vision, chest pain, shortness of breath, headache, nausea, dizziness/vertigo   Home BP los/60-70s, has known white coat effect   Adherence to current HTN meds: compliant all of the time     Antiplatelet/anticoagulation: Yes, Details: ASA 18mg daily, no bleeding or GI upset      Hyperlipidemia:  Stable, no current concerns.  Tolerating prava well.   Current treatment: prava 20mg   Myalgias? No  Other adverse drug reactions? No  Lipid profile:   LDL (mg/dL)   Date Value   2019 125 (H)   10/05/2018 153 (H)     HDL (mg/dL)   Date Value   2019 61   10/10/2018 74 (H)   10/05/2018 76     Pertinent HTN hx:   Age at HTN dx:  1 year ago   (if female, any hx of pregnancy-related HTN):  None   Past HTN medications: had been on metoprolol and felt fatigued and had to stop due to allergy testing and fatigue.  Currently lisinopril   HTN crises:  No prior hospitalization or crises, 2018 - admitted for elevated BP with negative cardiac w/u  ASCVD risk factors:     DM: No   CKD:  No  Lifestyle factors:     High salt: No   EtOH: wine, 6 drinks/week   Interfering substances:     NSAIDs: avoids    Decongestants. No, using allergy shots to help     ASCVD calculator (contraindicated if ASCVD+, [id number])   10yr-risk ca.7%     Clinical evidence of ASCVD:     1) hx of MI/ACS:  No   2) coronary or other revascularization procedure:  No   3) TIA/ischemic CVA: No   4) PAD (including CLARIBEL <0.9): No   5) documented (CAD, Renal athero, AA due to athero, carotid plaque >50% stenosis: No    Major RFs:     1) Age (Men>44, women>54):  yes   2) Fhx early CAD (<55 men, <65 women):  no   3) cigarette smoking:  No   4) high BP >139/89 or on tx: yes   5) Low HDL-C (<40 men, <50 women):  no    History   Smoking Status   • Former Smoker   • Quit date: 1/18/1976   Smokeless Tobacco   • Never Used     Social History   Substance Use Topics   • Smoking status: Former Smoker     Quit date: 1/18/1976   • Smokeless tobacco: Never Used   • Alcohol use 3.6 oz/week     6 Glasses of wine per week     Outpatient Encounter Prescriptions as of 4/18/2019   Medication Sig Dispense Refill   • pravastatin (PRAVACHOL) 40 MG tablet Take 1 Tab by mouth every day for 360 days. 90 Tab 3   • Diclofenac Sodium 1 % Gel Apply 2 gram on upper extremities twice a day as needed. 1 Tube 1   • ondansetron (ZOFRAN) 4 MG Tab tablet Take 1 Tab by mouth every four hours as needed for Nausea/Vomiting. 30 Tab 1   • fluticasone (FLONASE) 50 MCG/ACT nasal spray SPRAY 2 SPRAYS IN NOSE EVERY DAY. 16 g 3   • meclizine (ANTIVERT) 25 MG Tab Take 1 Tab by mouth 3 times a day as needed. 30 Tab 0   • coenzyme Q-10 30 MG capsule Take 60 mg by mouth every day.     • Multiple Vitamins-Minerals (WOMENS 50+ MULTI VITAMIN/MIN PO) Take  by mouth.     • lisinopril (PRINIVIL) 20 MG Tab TAKE 1 TABLET BY MOUTH EVERY DAY 30 Tab 11   • albuterol 108 (90 Base) MCG/ACT Aero Soln inhalation aerosol Inhale 2 Puffs by mouth every 6 hours as needed for Shortness of Breath.     • TURMERIC PO Take 1 Tab by mouth every day.     • omeprazole (PRILOSEC) 40 MG delayed-release capsule Take 40 mg by mouth every day.  5   • Probiotic Product (PROBIOTIC COLON SUPPORT PO) Take 1 Cap by mouth every day.     • Cholecalciferol (VITAMIN D3) 2000 UNIT Cap Take 4,000 Units by mouth every day.     • scopolamine (TRANSDERM-SCOP) 1 mg/72hr PATCH  72 HR Apply 1 Patch to skin as directed every 72 hours. (Patient not taking: Reported on 4/18/2019) 4 Patch 3   • [DISCONTINUED] pravastatin (PRAVACHOL) 20 MG Tab Take 1 Tab by mouth every day for 360 days. 90 Tab 3   • [DISCONTINUED] aspirin EC 81 MG EC tablet Take 1 Tab by mouth every day. 30 Tab      No facility-administered encounter medications on file as of 4/18/2019.      Allergies   Allergen Reactions   • Ciprofloxacin Hives     Okay to take cipro ear drops, no reaction with ear drop   • Latex Rash     Tape burns and rash- paper tape OK   • Other Environmental      seasonal   • Sulfa Drugs Itching     Not had but was told not to use   • Vicodin [Hydrocodone-Acetaminophen] Rash     Rash      DIET AND EXERCISE:  Weight Change: none   BMI Readings from Last 4 Encounters:   04/18/19 24.80 kg/m²   03/28/19 24.80 kg/m²   12/11/18 24.27 kg/m²   11/28/18 24.98 kg/m²     Diet: low fat  Exercise: minimal exercise     Review of Systems   Constitutional: Negative for chills, fever and malaise/fatigue.   HENT: Negative for nosebleeds, sore throat and tinnitus.    Eyes: Negative for blurred vision and double vision.   Respiratory: Negative for cough, hemoptysis, shortness of breath and wheezing.    Cardiovascular: Negative for chest pain, palpitations, orthopnea and leg swelling.   Gastrointestinal: Negative for abdominal pain, blood in stool, diarrhea, melena, nausea and vomiting.   Genitourinary: Negative for hematuria.   Musculoskeletal: Negative for joint pain and myalgias.   Skin: Negative for itching and rash.   Neurological: Negative for dizziness, tremors, focal weakness, seizures, weakness and headaches.   Endo/Heme/Allergies: Does not bruise/bleed easily.   Psychiatric/Behavioral: Negative for depression. The patient is not nervous/anxious and does not have insomnia.       Objective:     Vitals:    04/18/19 1406 04/18/19 1412   BP: 140/64 135/60   BP Location: Left arm Left arm   Patient Position: Sitting  "Sitting   BP Cuff Size: Adult Adult   Pulse: 67 71   Weight: 63.5 kg (140 lb)    Height: 1.6 m (5' 3\")       BP Readings from Last 4 Encounters:   04/18/19 135/60   03/28/19 122/64   12/11/18 130/68   11/28/18 126/74     Body mass index is 24.8 kg/m².     Physical Exam   Constitutional: She is oriented to person, place, and time. She appears well-developed and well-nourished. No distress.   HENT:   Head: Normocephalic and atraumatic.   Neck: Normal range of motion. Neck supple. No JVD present. No thyromegaly present.   Cardiovascular: Normal rate, regular rhythm, normal heart sounds and intact distal pulses.  Exam reveals no gallop and no friction rub.    No murmur heard.  Pulses:       Carotid pulses are 2+ on the right side, and 2+ on the left side.       Radial pulses are 2+ on the right side, and 2+ on the left side.        Dorsalis pedis pulses are 2+ on the right side, and 2+ on the left side.        Posterior tibial pulses are 2+ on the right side, and 2+ on the left side.   Edema     RLE: none     LLE: none   Spider telangectasia:       RLE:  None      LLE: none   Varicosities:         RLE: none      LLE: none   Corona phlebectatica:      RLE:  None       LLE:  None   Cording:         RLE:  None     LLE: None      Pulmonary/Chest: Effort normal and breath sounds normal.   Abdominal: Soft. Bowel sounds are normal. She exhibits no distension and no mass. There is no tenderness. There is no rebound and no guarding.   Musculoskeletal: Normal range of motion. She exhibits no edema or tenderness.   Neurological: She is alert and oriented to person, place, and time. No cranial nerve deficit. She exhibits normal muscle tone. Coordination normal.   Skin: Skin is warm and dry. She is not diaphoretic.   Psychiatric: She has a normal mood and affect.     Lab Results   Component Value Date    CHOLSTRLTOT 218 (H) 03/26/2019     (H) 03/26/2019    HDL 61 03/26/2019    TRIGLYCERIDE 159 (H) 03/26/2019    LDLPART 21.5 " 10/10/2018    LDLPART 1523 (H) 10/10/2018    SMLLDL 538 10/10/2018    LHDLPART 7.9 10/10/2018    LVLDLPT 5.4 (H) 10/10/2018    LDLCHOL 156 (H) 10/10/2018    HDLSIZE 9.0 10/10/2018    VLDLSIZE 52.1 (H) 10/10/2018    HDLPART >41.0 10/10/2018      Lab Results   Component Value Date    PROTHROMBTM 12.1 06/05/2018    INR 0.90 06/05/2018       Lab Results   Component Value Date    HBA1C 5.6 09/20/2018      Lab Results   Component Value Date    SODIUM 140 03/26/2019    POTASSIUM 4.3 03/26/2019    CHLORIDE 107 03/26/2019    CO2 24 03/26/2019    GLUCOSE 86 03/26/2019    BUN 15 03/26/2019    CREATININE 0.74 03/26/2019    IFAFRICA >60 03/26/2019    IFNOTAFR >60 03/26/2019        Lab Results   Component Value Date    WBC 5.9 10/05/2018    RBC 4.49 10/05/2018    HEMOGLOBIN 13.4 10/05/2018    HEMATOCRIT 41.4 10/05/2018    MCV 92.2 10/05/2018    MCH 29.8 10/05/2018    MCHC 32.4 (L) 10/05/2018    MPV 11.4 10/05/2018      VASCULAR IMAGING:     MPI 6/5/18   NUCLEAR IMAGING INTERPRETATION   Normal left ventricular size, ejection fraction, and wall motion.   No evidence of significant jeopardized viable myocardium or prior myocardial    infarction.   ECG INTERPRETATION   Negative stress ECG for ischemia.      Medical Decision Making:  Today's Assessment / Status / Plan:     1. Pure hypercholesterolemia  Comp Metabolic Panel    LipoFit by NMR    CRP HIGH SENSITIVE (CARDIAC)   2. Essential hypertension     3. Vitamin D insufficiency       Patient Type: Primary Prevention    Etiology of Established CVD if Present: n/a    Lipid Management: Qualifies for Statin Therapy Based on 2013 ACC/AHA Guidelines: yes  Calculated 10-Year Risk of ASCVD: 4.0%  Currently on Statin: Yes  TSH normal.  Multiple statin intolerance in past, currently tolerating prava.   NLA Risk Category:   Moderate: 2 major RFs, risk scoring >10%, other risk scoring (CAC, advanced lipid, hsCRP)  Tx threshold:  non-HDL-C >159, LDL-C >129  Tx goal:  non-HDL <130, LDL-C <100  (optional: apoB<90)   At goal:  No, based upon 9/2018 labs   Tx plan:  - increase pravastatin to 40mg daily   - continue vit D3 4000 units daily     Blood Pressure Management:Goal: ACC/AHA (2017) goal <130/80  Home BP at goal:  yes  Office BP at goal:  yes  Plan:   - continue home BP monitoring, reviewed correct technique:  Yes   - order 24h ABPM:  UNDECIDED, consider if white coat effect     Glycemic Status: Diabetic  - check annual     Anti-Platelet/Anti-Coagulant Tx: yes  - stop aspirin as not in qualifying group based on recent literature     Smoking: continued complete avoidance of all tobacco products      Physical Activity: advised to engage in walking >150min/week, most days     Weight Management and Nutrition: Dietary plan was discussed with patient at this visit including plate method, reduced kcal.  decrase 5% weight     Other:   1) allergy - continue with Dr. Berry     Instructed to follow-up with PCP for remainder of adult medical needs: yes  We will partner with other providers in the management of established vascular disease and cardiometabolic risk factors.    Studies to Be Obtained:  None   Labs to Be Obtained:  CMP, NMR, hsCRP in 3mo    Follow up in:   3mo with ROOSEVELT Neal M.D.

## 2019-05-10 ENCOUNTER — SLEEP CENTER VISIT (OUTPATIENT)
Dept: SLEEP MEDICINE | Facility: MEDICAL CENTER | Age: 59
End: 2019-05-10
Payer: COMMERCIAL

## 2019-05-10 VITALS
WEIGHT: 137 LBS | DIASTOLIC BLOOD PRESSURE: 70 MMHG | RESPIRATION RATE: 16 BRPM | OXYGEN SATURATION: 98 % | HEART RATE: 66 BPM | HEIGHT: 63 IN | SYSTOLIC BLOOD PRESSURE: 128 MMHG | BODY MASS INDEX: 24.27 KG/M2

## 2019-05-10 DIAGNOSIS — I10 ESSENTIAL HYPERTENSION: ICD-10-CM

## 2019-05-10 DIAGNOSIS — R06.83 SNORING: ICD-10-CM

## 2019-05-10 DIAGNOSIS — G47.10 HYPERSOMNOLENCE: ICD-10-CM

## 2019-05-10 DIAGNOSIS — Z87.891 FORMER SMOKER: ICD-10-CM

## 2019-05-10 DIAGNOSIS — G47.30 SLEEP APNEA, UNSPECIFIED TYPE: ICD-10-CM

## 2019-05-10 PROCEDURE — 99244 OFF/OP CNSLTJ NEW/EST MOD 40: CPT | Performed by: INTERNAL MEDICINE

## 2019-05-10 NOTE — PROGRESS NOTES
CC: Snoring and excessive daytime somnolence, suspected sleep apnea hypopnea syndrome.    HPI:   Ms. Rodriguez is a 58-year-old woman referred by Dr. Juany Ralph to assist in the evaluation and management of suspected sleep disordered breathing.    She has a long history of environmental allergies and left ear problems.  She underwent mastoid surgery on 2 occasions.  Over the past 8 years she has had increasing pain in the left ear and jaw.  Imaging studies did not suggest recurrent disease, fluid collections or abscess.  She was evaluated by Dr. Tereso Ascencio and it seems that much of her pain was related to left jaw problems.  With treatment her ear pain and associated headaches have improved significantly.  In the course of her evaluation symptoms suggesting sleep disordered breathing were identified.    She has a regular sleep schedule, as confirmed by the sleep diary, with bedtime at about 9:30 to 10 PM.  She falls asleep within about 20 minutes and may awaken once on an average night.  She arises at about 5 AM feeling tired and a bit groggy.  She also has frequent morning bifrontal headaches.  She has been  for some years but her  previously said that she did snore.  We do not have any information on possible cyclic breathing or apnea.  She is quite tired during the day and regularly doses off during quiet moments.  She also describes severe daytime fatigue and her Thorndike sleepiness score is 15 points.  She drinks caffeinated beverages sparingly and does not use substances to induce sleep or to maintain wakefulness.  She does not have symptoms suggesting narcolepsy, parasomnia or restless leg syndrome.  She has not previously been evaluated for sleep issues.  She does not have a known family history of sleep problems.    Her past medical history is remarkable for systemic arterial hypertension treated with lisinopril and gastroesophageal reflux disease with a previous diagnosis of Dewitt's  esophagus.  Her symptoms are controlled on daily omeprazole.  A nuclear medicine stress test in June 2018 suggested normal left ventricular size and function.        Patient Active Problem List    Diagnosis Date Noted   • Primary osteoarthritis of both hands 03/28/2019   • Primary insomnia 01/25/2018   • Hot flashes, menopausal 01/25/2018   • Vitamin D insufficiency 10/25/2017   • Chronic fatigue 09/19/2017   • Muscle cramp 09/19/2017   • Essential hypertension 09/19/2017   • Muscle spasms of neck 03/23/2017   • Left elbow pain 09/30/2016   • Ulcer of esophagus without bleeding 09/30/2016   • Pressure sensation in left ear, left temporal area and left TM joint 01/18/2016   • History of hypothyroidism 01/18/2016   • Pure hypercholesterolemia 01/18/2016   • History of carpal tunnel repair 09/30/2014   • Seasonal allergies 02/13/2014   • Gastroesophageal reflux disease without esophagitis 02/13/2014       Past Medical History:   Diagnosis Date   • Abdominal pain    • Allergic rhinitis    • Anesthesia     ponv   • Back pain    • Blood in urine    • Chest tightness    • Constipation    • Cough    • Daytime sleepiness    • Diarrhea    • Earache    • Eye drainage    • Fatigue    • Frequent headaches    • GERD (gastroesophageal reflux disease)    • Heartburn    • High cholesterol    • Hoarseness, persistent    • Hypertension    • Indigestion    • Irregular periods    • Morning headache    • MRSA (methicillin resistant staph aureus) culture positive 2010    from spider bite- no open wounds as of 2018   • Nasal drainage    • Painful joint    • Painful urination    • Palpitations    • Restless leg syndrome    • Rhinitis    • Ringing in ears    • Sore muscles    • Swelling of lower extremity    • Unspecified disorder of thyroid    • Unspecified urinary incontinence    • Wears glasses        Past Surgical History:   Procedure Laterality Date   • TRIGGER FINGER RELEASE Right 11/20/2018    Procedure: TRIGGER FINGER RELEASE -  THUMB;  Surgeon: Edgar Leblanc M.D.;  Location: [facility];  Service: Orthopedics   • JOINT INJECTION DIAGNOSTIC Bilateral 2018    Procedure: JOINT INJECTION DIAGNOSTIC - [facility] JOINTS;  Surgeon: Edgar Leblanc M.D.;  Location: [facility];  Service: Orthopedics   • CARPAL TUNNEL ENDOSCOPIC  2014    Both side Performed by Edgar Leblanc M.D. at [facility]   • ABDOMINAL HYSTERECTOMY TOTAL      Hysterectomy and removal Ovaries and tubes, Total Abdominal in    • BONE GRAFT      bone graft surgery on left second toe in  due to fracture   • CARPAL TUNNEL RELEASE     • CYSTOSCOPY      Removal of polyps from urinary bladder and dilation of urethra in    • HYSTERECTOMY LAPAROSCOPY     • INCISION AND DRAINAGE ENT      I&D of left ear for ear infection twice in  and  by Dr Kahn, ENT   • NASAL SEPTAL RECONSTRUCTION             Family History   Problem Relation Age of Onset   • Cancer Paternal Grandmother    • Other Paternal Grandmother         lupus   • Heart Disease Paternal Grandmother          70s   • Cancer Paternal Aunt    • Other Mother         Parkinson disease   • Bipolar disorder Mother    • Hypertension Mother    • Thyroid Mother         Hypothyroid   • Heart Disease Father         Congenital heart disease, CHF   • Hypertension Father    • Diabetes Father    • Stroke Sister         age 53, smoker   • Bipolar disorder Sister    • Other Sister         alcoholic        Social History     Social History   • Marital status:      Spouse name: N/A   • Number of children: N/A   • Years of education: N/A     Occupational History   • Not on file.     Social History Main Topics   • Smoking status: Former Smoker     Quit date: 1976   • Smokeless tobacco: Never Used   • Alcohol use 3.6 oz/week     6 Glasses of wine per week   • Drug use: No   • Sexual activity: Not Currently     Other Topics Concern   • Not on file     Social History  "Narrative   • No narrative on file       Current Outpatient Prescriptions   Medication Sig Dispense Refill   • pravastatin (PRAVACHOL) 40 MG tablet Take 1 Tab by mouth every day for 360 days. 90 Tab 3   • coenzyme Q-10 30 MG capsule Take 60 mg by mouth every day.     • Multiple Vitamins-Minerals (WOMENS 50+ MULTI VITAMIN/MIN PO) Take  by mouth.     • lisinopril (PRINIVIL) 20 MG Tab TAKE 1 TABLET BY MOUTH EVERY DAY 30 Tab 11   • albuterol 108 (90 Base) MCG/ACT Aero Soln inhalation aerosol Inhale 2 Puffs by mouth every 6 hours as needed for Shortness of Breath.     • TURMERIC PO Take 1 Tab by mouth every day.     • omeprazole (PRILOSEC) 40 MG delayed-release capsule Take 40 mg by mouth every day.  5   • Probiotic Product (PROBIOTIC COLON SUPPORT PO) Take 1 Cap by mouth every day.     • Cholecalciferol (VITAMIN D3) 2000 UNIT Cap Take 4,000 Units by mouth every day.     • Diclofenac Sodium 1 % Gel Apply 2 gram on upper extremities twice a day as needed. (Patient not taking: Reported on 5/10/2019) 1 Tube 1   • ondansetron (ZOFRAN) 4 MG Tab tablet Take 1 Tab by mouth every four hours as needed for Nausea/Vomiting. (Patient not taking: Reported on 5/10/2019) 30 Tab 1   • fluticasone (FLONASE) 50 MCG/ACT nasal spray SPRAY 2 SPRAYS IN NOSE EVERY DAY. 16 g 3   • meclizine (ANTIVERT) 25 MG Tab Take 1 Tab by mouth 3 times a day as needed. (Patient not taking: Reported on 5/10/2019) 30 Tab 0   • scopolamine (TRANSDERM-SCOP) 1 mg/72hr PATCH 72 HR Apply 1 Patch to skin as directed every 72 hours. (Patient not taking: Reported on 4/18/2019) 4 Patch 3     No current facility-administered medications for this visit.     \"CURRENT RX\"      Allergies: Ciprofloxacin; Latex; Other environmental; Sulfa drugs; and Vicodin [hydrocodone-acetaminophen]      ROS  Positive for the sleep, cardiac and GI issues reviewed above as well as the history of ear and jaw problems and associated allergies.  She wears corrective lenses but has no diplopia.  " "She has some occasional nocturnal palpitations but has not had dizziness or syncope.  She does have some nonproductive cough associated with the allergies and perhaps some mild dyspnea but no known history of asthma.  She has a history of surgery for bladder polyps as well as some arthralgias and back pain that may interfere with sleep.  She does have frequent headaches.  All other aspects of the CPT review of systems process are negative as documented in the attached self history form.      Physical Exam:   /70 (BP Location: Left arm, Patient Position: Sitting, BP Cuff Size: Adult)   Pulse 66   Resp 16   Ht 1.6 m (5' 3\")   Wt 62.1 kg (137 lb)   SpO2 98%   BMI 24.27 kg/m²    Head and neck examination demonstrates no mucosal lesion, purulent drainage or evident polyps. The pharynx is benign with a Mallampati III presentation. The neck is supple without thyromegaly. On chest examination there are symmetrical bilateral breath sounds without rales, wheezing or consolidation. On cardiac examination, the apical impulse and heart sounds are normal and the rhythm is regular. There is no murmur, gallop or rub and no jugular venous distention. The abdomen is soft with active bowel sounds and no palpable hepatosplenomegaly, mass, guarding or rebound. The extremities show no clubbing, cyanosis or edema and no signs of deep venous thrombosis. There is no warmth, redness, tenderness or palpable venous cord in the calves. The skin is clear, warm and dry. There is no unusual peripheral lymphadenopathy. Peripheral pulses are palpable in all 4 extremities. On neurologic examination, cranial nerve function is intact, motor tone is symmetrical, and the patient is alert, oriented and responsive.       Problems:  1. Sleep apnea, unspecified type  She presents with snoring and excessive daytime somnolence and fatigue.  She has a crowded posterior pharyngeal airway and a history of systemic arterial hypertension.  The clinical " probability of sleep apnea hypopnea syndrome is significant. Accurate diagnosis and effective treatment are required not only to improve levels of daytime alertness but also to reduce the cardiac and neurologic risks associated with untreated sleep apnea. We have discussed diagnostic options including in-laboratory, attended polysomnography and home sleep testing. We have also discussed treatment options including airway pressurization, reconstructive otolaryngologic surgery, dental appliances and weight management.    2. Hypersomnolence  Probably related to the suspected sleep disordered breathing.  Her nightly sleep time is sufficient at about 7.5 hours on average she does not seem to have issues with sleep hygiene.    3. Snoring    4. Essential hypertension  Controlled with a blood pressure today of 128/70 mmHg.    5. Former smoker      Plan:   1.  Polysomnogram.  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  We will therefore begin with a home sleep test.    2.  Return visit after testing to discuss results and treatment options.    We appreciate the opportunity to assist in her care.

## 2019-05-13 ENCOUNTER — TELEPHONE (OUTPATIENT)
Dept: VASCULAR LAB | Facility: MEDICAL CENTER | Age: 59
End: 2019-05-13

## 2019-05-13 NOTE — TELEPHONE ENCOUNTER
Attempted to return pt call regarding bothersome side effects.  Pt was unable to talk currently-- she will call back tomorrow at a better time.    Janine ALBERT  [facility]

## 2019-05-14 ENCOUNTER — TELEPHONE (OUTPATIENT)
Dept: VASCULAR LAB | Facility: MEDICAL CENTER | Age: 59
End: 2019-05-14

## 2019-05-14 NOTE — TELEPHONE ENCOUNTER
Spoke with pt over phone.  She states she has been having increased anxiety, feeling tired/irritated, sluggish and not sleeping well at night.  Also some leg cramping at night time.  She feels this is related to recently starting her pravastatin.  She became tearful.  Recommended she hold prava for 4 weeks, then come in for follow up visit to discuss plan.  She is in agreement.    Janine ALBERT  [facility]

## 2019-06-12 ENCOUNTER — TELEPHONE (OUTPATIENT)
Dept: VASCULAR LAB | Facility: MEDICAL CENTER | Age: 59
End: 2019-06-12

## 2019-06-12 NOTE — TELEPHONE ENCOUNTER
Called patient to reschedule vascular appointment. LM instructing patient to call back when available to schedule.    Asad Mcintyre, Med. Ass't  [facility]

## 2019-06-17 ENCOUNTER — APPOINTMENT (OUTPATIENT)
Dept: VASCULAR LAB | Facility: MEDICAL CENTER | Age: 59
End: 2019-06-17
Payer: COMMERCIAL

## 2019-06-19 ENCOUNTER — OFFICE VISIT (OUTPATIENT)
Dept: VASCULAR LAB | Facility: MEDICAL CENTER | Age: 59
End: 2019-06-19
Attending: INTERNAL MEDICINE
Payer: COMMERCIAL

## 2019-06-19 VITALS
DIASTOLIC BLOOD PRESSURE: 45 MMHG | HEIGHT: 63 IN | BODY MASS INDEX: 24.2 KG/M2 | WEIGHT: 136.6 LBS | HEART RATE: 61 BPM | SYSTOLIC BLOOD PRESSURE: 112 MMHG

## 2019-06-19 DIAGNOSIS — E55.9 VITAMIN D INSUFFICIENCY: ICD-10-CM

## 2019-06-19 DIAGNOSIS — I10 ESSENTIAL HYPERTENSION: ICD-10-CM

## 2019-06-19 DIAGNOSIS — E78.00 PURE HYPERCHOLESTEROLEMIA: ICD-10-CM

## 2019-06-19 PROCEDURE — 99212 OFFICE O/P EST SF 10 MIN: CPT | Performed by: NURSE PRACTITIONER

## 2019-06-19 PROCEDURE — 99214 OFFICE O/P EST MOD 30 MIN: CPT | Performed by: NURSE PRACTITIONER

## 2019-06-19 RX ORDER — EZETIMIBE 10 MG/1
10 TABLET ORAL DAILY
Qty: 90 TAB | Refills: 3 | Status: SHIPPED | OUTPATIENT
Start: 2019-06-19 | End: 2019-09-18 | Stop reason: SDUPTHER

## 2019-06-19 RX ORDER — CHOLESTYRAMINE LIGHT 4 G/5.7G
POWDER, FOR SUSPENSION ORAL 2 TIMES DAILY
COMMUNITY
End: 2019-10-03

## 2019-06-19 ASSESSMENT — ENCOUNTER SYMPTOMS
HEMOPTYSIS: 0
DIARRHEA: 0
PALPITATIONS: 1
SORE THROAT: 0
SEIZURES: 0
COUGH: 0
BLURRED VISION: 0
ORTHOPNEA: 0
NERVOUS/ANXIOUS: 0
DIZZINESS: 0
DEPRESSION: 0
WHEEZING: 0
ABDOMINAL PAIN: 0
NAUSEA: 0
MYALGIAS: 0
BLOOD IN STOOL: 0
DOUBLE VISION: 0
BRUISES/BLEEDS EASILY: 0
HEADACHES: 0
INSOMNIA: 0
SHORTNESS OF BREATH: 0
VOMITING: 0
WEAKNESS: 0
TREMORS: 0
FOCAL WEAKNESS: 0
CHILLS: 0
FEVER: 0

## 2019-06-19 NOTE — PROGRESS NOTES
FOLLOW-UP VASCULAR MED VISIT  Subjective:   Danii Rodriguez is a 58 y.o. female who presents today 19 for   Chief Complaint   Patient presents with   • Follow-Up   Referred by Dr. Ralph (PCP) for evaluation and mgmt of HLD in context of prior statin intolerance    HPI:     Danii Rodriguez had been placed on rosuvastatin 40mg daily  Began to have depression and hopelessness about a month after starting, so severe she felt suicidal  Since she has stopped it-- has been back to normal  Has taken lipitor and crestor-- could not tolerate either due to bothersome side effects    Current HTN concerns: Denies   HTN sx:  No current blurred or changed vision, chest pain, shortness of breath, headache, nausea, dizziness/vertigo   Home BP los/60-70s, has known white coat effect   Adherence to current HTN meds: compliant all of the time     Antiplatelet/anticoagulation: No stopped as not in qualifying group    Hyperlipidemia: No treatment currently  Current treatment: None  Myalgias? No  Other adverse drug reactions? Yes-- severe depression  Lipid profile:   LDL (mg/dL)   Date Value   2019 125 (H)   10/05/2018 153 (H)     HDL (mg/dL)   Date Value   2019 61   10/10/2018 74 (H)   10/05/2018 76     Pertinent HTN hx:   Age at HTN dx:  1 year ago   (if female, any hx of pregnancy-related HTN):  None   Past HTN medications: had been on metoprolol and felt fatigued and had to stop due to allergy testing and fatigue.  Currently lisinopril   HTN crises:  No prior hospitalization or crises, 2018 - admitted for elevated BP with negative cardiac w/u  ASCVD risk factors:     DM: No   CKD:  No  Lifestyle factors:     High salt: No   EtOH: wine, 6 drinks/week   Interfering substances:     NSAIDs: avoids    Decongestants. No, using allergy shots to help     ASCVD calculator (contraindicated if ASCVD+, [id number])   10yr-risk ca.7%     Clinical evidence of ASCVD:     1) hx of MI/ACS:  No   2) coronary or other  revascularization procedure: No   3) TIA/ischemic CVA: No   4) PAD (including CLARIBEL <0.9): No   5) documented (CAD, Renal athero, AA due to athero, carotid plaque >50% stenosis: No    Major RFs:     1) Age (Men>44, women>54):  yes   2) Fhx early CAD (<55 men, <65 women):  no   3) cigarette smoking:  No   4) high BP >139/89 or on tx: yes   5) Low HDL-C (<40 men, <50 women):  no    History   Smoking Status   • Former Smoker   • Quit date: 1/18/1976   Smokeless Tobacco   • Never Used     Social History   Substance Use Topics   • Smoking status: Former Smoker     Quit date: 1/18/1976   • Smokeless tobacco: Never Used   • Alcohol use 3.6 oz/week     6 Glasses of wine per week     Outpatient Encounter Prescriptions as of 6/19/2019   Medication Sig Dispense Refill   • ezetimibe (ZETIA) 10 MG Tab Take 1 Tab by mouth every day. 90 Tab 3   • Diclofenac Sodium 1 % Gel Apply 2 gram on upper extremities twice a day as needed. 1 Tube 1   • ondansetron (ZOFRAN) 4 MG Tab tablet Take 1 Tab by mouth every four hours as needed for Nausea/Vomiting. 30 Tab 1   • fluticasone (FLONASE) 50 MCG/ACT nasal spray SPRAY 2 SPRAYS IN NOSE EVERY DAY. 16 g 3   • meclizine (ANTIVERT) 25 MG Tab Take 1 Tab by mouth 3 times a day as needed. 30 Tab 0   • scopolamine (TRANSDERM-SCOP) 1 mg/72hr PATCH 72 HR Apply 1 Patch to skin as directed every 72 hours. 4 Patch 3   • coenzyme Q-10 30 MG capsule Take 60 mg by mouth every day.     • Multiple Vitamins-Minerals (WOMENS 50+ MULTI VITAMIN/MIN PO) Take  by mouth.     • lisinopril (PRINIVIL) 20 MG Tab TAKE 1 TABLET BY MOUTH EVERY DAY 30 Tab 11   • TURMERIC PO Take 1 Tab by mouth every day.     • omeprazole (PRILOSEC) 40 MG delayed-release capsule Take 40 mg by mouth every day.  5   • Probiotic Product (PROBIOTIC COLON SUPPORT PO) Take 1 Cap by mouth every day.     • Cholecalciferol (VITAMIN D3) 2000 UNIT Cap Take 4,000 Units by mouth every day.     • [DISCONTINUED] pravastatin (PRAVACHOL) 40 MG tablet Take 1 Tab  "by mouth every day for 360 days. (Patient not taking: Reported on 6/19/2019) 90 Tab 3   • albuterol 108 (90 Base) MCG/ACT Aero Soln inhalation aerosol Inhale 2 Puffs by mouth every 6 hours as needed for Shortness of Breath.       No facility-administered encounter medications on file as of 6/19/2019.      Allergies   Allergen Reactions   • Ciprofloxacin Hives     Okay to take cipro ear drops, no reaction with ear drop   • Latex Rash     Tape burns and rash- paper tape OK   • Other Environmental      seasonal   • Sulfa Drugs Itching     Not had but was told not to use   • Vicodin [Hydrocodone-Acetaminophen] Rash     Rash      DIET AND EXERCISE:  Weight Change: down 4 lbs    BMI Readings from Last 4 Encounters:   04/18/19 24.80 kg/m²   03/28/19 24.80 kg/m²   12/11/18 24.27 kg/m²   11/28/18 24.98 kg/m²     Diet: low fat  Exercise: minimal exercise     Review of Systems   Constitutional: Negative for chills, fever and malaise/fatigue.   HENT: Negative for nosebleeds, sore throat and tinnitus.    Eyes: Negative for blurred vision and double vision.   Respiratory: Negative for cough, hemoptysis, shortness of breath and wheezing.    Cardiovascular: Positive for palpitations. Negative for chest pain, orthopnea and leg swelling.   Gastrointestinal: Negative for abdominal pain, blood in stool, diarrhea, melena, nausea and vomiting.   Genitourinary: Negative for hematuria.   Musculoskeletal: Negative for joint pain and myalgias.   Skin: Negative for itching and rash.   Neurological: Negative for dizziness, tremors, focal weakness, seizures, weakness and headaches.   Endo/Heme/Allergies: Does not bruise/bleed easily.   Psychiatric/Behavioral: Negative for depression. The patient is not nervous/anxious and does not have insomnia.       Objective:     Vitals:    06/19/19 1429   BP: 112/45   BP Location: Left arm   Patient Position: Sitting   BP Cuff Size: Adult   Pulse: 61   Weight: 62 kg (136 lb 9.6 oz)   Height: 1.6 m (5' 3\") "      BP Readings from Last 4 Encounters:   06/19/19 112/45   05/10/19 128/70   04/18/19 135/60   03/28/19 122/64     Body mass index is 24.2 kg/m².     Physical Exam   Constitutional: She is oriented to person, place, and time. She appears well-developed and well-nourished. No distress.   Cardiovascular: Normal rate, regular rhythm, normal heart sounds and intact distal pulses.  Exam reveals no gallop and no friction rub.    No murmur heard.  Pulses:       Carotid pulses are 2+ on the right side, and 2+ on the left side.       Radial pulses are 2+ on the right side, and 2+ on the left side.        Dorsalis pedis pulses are 2+ on the right side, and 2+ on the left side.        Posterior tibial pulses are 2+ on the right side, and 2+ on the left side.   Edema     RLE: none     LLE: none   Spider telangectasia:       RLE:  None      LLE: none   Varicosities:         RLE: none      LLE: none   Corona phlebectatica:      RLE:  None       LLE:  None   Cording:         RLE:  None     LLE: None      Pulmonary/Chest: Effort normal and breath sounds normal.   Musculoskeletal: Normal range of motion. She exhibits no edema or tenderness.   Neurological: She is alert and oriented to person, place, and time. No cranial nerve deficit. She exhibits normal muscle tone. Coordination normal.   Skin: Skin is warm and dry. She is not diaphoretic.   Psychiatric: She has a normal mood and affect.     Lab Results   Component Value Date    CHOLSTRLTOT 218 (H) 03/26/2019     (H) 03/26/2019    HDL 61 03/26/2019    TRIGLYCERIDE 159 (H) 03/26/2019    LDLPART 21.5 10/10/2018    LDLPART 1523 (H) 10/10/2018    SMLLDL 538 10/10/2018    LHDLPART 7.9 10/10/2018    LVLDLPT 5.4 (H) 10/10/2018    LDLCHOL 156 (H) 10/10/2018    HDLSIZE 9.0 10/10/2018    VLDLSIZE 52.1 (H) 10/10/2018    HDLPART >41.0 10/10/2018      Lab Results   Component Value Date    PROTHROMBTM 12.1 06/05/2018    INR 0.90 06/05/2018       Lab Results   Component Value Date     HBA1C 5.6 09/20/2018      Lab Results   Component Value Date    SODIUM 140 03/26/2019    POTASSIUM 4.3 03/26/2019    CHLORIDE 107 03/26/2019    CO2 24 03/26/2019    GLUCOSE 86 03/26/2019    BUN 15 03/26/2019    CREATININE 0.74 03/26/2019    IFAFR[place] >60 03/26/2019    IFNOTAFR >60 03/26/2019        Lab Results   Component Value Date    WBC 5.9 10/05/2018    RBC 4.49 10/05/2018    HEMOGLOBIN 13.4 10/05/2018    HEMATOCRIT 41.4 10/05/2018    MCV 92.2 10/05/2018    MCH 29.8 10/05/2018    MCHC 32.4 (L) 10/05/2018    MPV 11.4 10/05/2018      VASCULAR IMAGING:     MPI 6/5/18   NUCLEAR IMAGING INTERPRETATION   Normal left ventricular size, ejection fraction, and wall motion.   No evidence of significant jeopardized viable myocardium or prior myocardial    infarction.   ECG INTERPRETATION   Negative stress ECG for ischemia.    Medical Decision Making:  Today's Assessment / Status / Plan:     1. Essential hypertension  Comp Metabolic Panel   2. Pure hypercholesterolemia  LipoFit by NMR   3. Vitamin D insufficiency  VITAMIN D,25 HYDROXY     Patient Type: Primary Prevention    Etiology of Established CVD if Present: n/a    Lipid Management: Qualifies for Statin Therapy Based on 2013 ACC/AHA Guidelines: yes  Calculated 10-Year Risk of ASCVD: 4.0%  Currently on Statin: Yes  TSH normal.  Multiple statin intolerance in past including Lipitor, Crestor and Prava.   NLA Risk Category:   Moderate: 2 major RFs, risk scoring >10%, other risk scoring (CAC, advanced lipid, hsCRP)  Tx threshold:  non-HDL-C >159, LDL-C >129  Tx goal:  non-HDL <130, LDL-C <100 (optional: apoB<90)   At goal:  No, based upon 9/2018 labs   Potential for Zetia to decrease her LDL 20-30%, plus TLC could likely get her levels to goal.  Pt willing to trial.   Tx plan:  - Trial Zetia 10mg daily   - Continue Vit D3 4000 units daily   - Continue cholestoff   - Recheck lab work prior to next appt    Blood Pressure Management:Goal: ACC/AHA (2017) goal <130/80  Home BP at  goal:  yes  Office BP at goal:  yes  Plan:   - Continue home BP monitoring, reviewed correct technique:  Yes     Glycemic Status: Diabetic  - Check annually     Anti-Platelet/Anti-Coagulant Tx: Not in qualifying group based on recent literature    Smoking: continued complete avoidance of all tobacco products      Physical Activity: advised to engage in walking >150min/week, most days     Weight Management and Nutrition: Dietary plan was discussed with patient at this visit including plate method, reduced kcal.  decrease 5% weight     Other:   1) allergy - continue with Dr. Berry     Instructed to follow-up with PCP for remainder of adult medical needs: yes  We will partner with other providers in the management of established vascular disease and cardiometabolic risk factors.    Studies to Be Obtained:  None   Labs to Be Obtained: As above prior to next appt    Follow up in:  3 months     NAV Beauchamp.

## 2019-07-09 ENCOUNTER — HOSPITAL ENCOUNTER (OUTPATIENT)
Dept: LAB | Facility: MEDICAL CENTER | Age: 59
End: 2019-07-09
Attending: NURSE PRACTITIONER
Payer: COMMERCIAL

## 2019-07-09 LAB
BASOPHILS # BLD AUTO: 0.3 % (ref 0–1.8)
BASOPHILS # BLD: 0.02 K/UL (ref 0–0.12)
EOSINOPHIL # BLD AUTO: 0.09 K/UL (ref 0–0.51)
EOSINOPHIL NFR BLD: 1.5 % (ref 0–6.9)
ERYTHROCYTE [DISTWIDTH] IN BLOOD BY AUTOMATED COUNT: 45.6 FL (ref 35.9–50)
HCT VFR BLD AUTO: 38.9 % (ref 37–47)
HGB BLD-MCNC: 12.9 G/DL (ref 12–16)
IMM GRANULOCYTES # BLD AUTO: 0.01 K/UL (ref 0–0.11)
IMM GRANULOCYTES NFR BLD AUTO: 0.2 % (ref 0–0.9)
LYMPHOCYTES # BLD AUTO: 1.72 K/UL (ref 1–4.8)
LYMPHOCYTES NFR BLD: 28.7 % (ref 22–41)
MCH RBC QN AUTO: 30.9 PG (ref 27–33)
MCHC RBC AUTO-ENTMCNC: 33.2 G/DL (ref 33.6–35)
MCV RBC AUTO: 93.1 FL (ref 81.4–97.8)
MONOCYTES # BLD AUTO: 0.42 K/UL (ref 0–0.85)
MONOCYTES NFR BLD AUTO: 7 % (ref 0–13.4)
NEUTROPHILS # BLD AUTO: 3.73 K/UL (ref 2–7.15)
NEUTROPHILS NFR BLD: 62.3 % (ref 44–72)
NRBC # BLD AUTO: 0 K/UL
NRBC BLD-RTO: 0 /100 WBC
PLATELET # BLD AUTO: 268 K/UL (ref 164–446)
PMV BLD AUTO: 11 FL (ref 9–12.9)
RBC # BLD AUTO: 4.18 M/UL (ref 4.2–5.4)
WBC # BLD AUTO: 6 K/UL (ref 4.8–10.8)

## 2019-07-09 PROCEDURE — 36415 COLL VENOUS BLD VENIPUNCTURE: CPT

## 2019-07-09 PROCEDURE — 85025 COMPLETE CBC W/AUTO DIFF WBC: CPT

## 2019-07-16 RX ORDER — FLUTICASONE PROPIONATE 50 MCG
SPRAY, SUSPENSION (ML) NASAL
Qty: 16 BOTTLE | Refills: 3 | Status: SHIPPED | OUTPATIENT
Start: 2019-07-16 | End: 2019-10-09

## 2019-07-16 NOTE — TELEPHONE ENCOUNTER
Was the patient seen in the last year in this department? Yes lov 3/28/19    Does patient have an active prescription for medications requested? No     Received Request Via: Pharmacy

## 2019-07-18 DIAGNOSIS — I10 ESSENTIAL HYPERTENSION: ICD-10-CM

## 2019-07-18 RX ORDER — LISINOPRIL 20 MG/1
TABLET ORAL
Qty: 100 TAB | Refills: 3 | Status: SHIPPED | OUTPATIENT
Start: 2019-07-18 | End: 2019-10-09

## 2019-09-05 ENCOUNTER — HOSPITAL ENCOUNTER (OUTPATIENT)
Dept: LAB | Facility: MEDICAL CENTER | Age: 59
End: 2019-09-05
Attending: NURSE PRACTITIONER
Payer: COMMERCIAL

## 2019-09-05 ENCOUNTER — HOSPITAL ENCOUNTER (OUTPATIENT)
Dept: LAB | Facility: MEDICAL CENTER | Age: 59
End: 2019-09-05
Attending: FAMILY MEDICINE
Payer: COMMERCIAL

## 2019-09-05 ENCOUNTER — HOSPITAL ENCOUNTER (OUTPATIENT)
Dept: LAB | Facility: MEDICAL CENTER | Age: 59
End: 2019-09-05
Attending: INTERNAL MEDICINE
Payer: COMMERCIAL

## 2019-09-05 DIAGNOSIS — E55.9 VITAMIN D INSUFFICIENCY: ICD-10-CM

## 2019-09-05 DIAGNOSIS — I10 ESSENTIAL HYPERTENSION: ICD-10-CM

## 2019-09-05 DIAGNOSIS — E78.00 PURE HYPERCHOLESTEROLEMIA: ICD-10-CM

## 2019-09-05 DIAGNOSIS — M19.042 PRIMARY OSTEOARTHRITIS OF BOTH HANDS: ICD-10-CM

## 2019-09-05 DIAGNOSIS — M19.041 PRIMARY OSTEOARTHRITIS OF BOTH HANDS: ICD-10-CM

## 2019-09-05 LAB
25(OH)D3 SERPL-MCNC: 39 NG/ML (ref 30–100)
ALBUMIN SERPL BCP-MCNC: 5 G/DL (ref 3.2–4.9)
ALBUMIN/GLOB SERPL: 1.8 G/DL
ALP SERPL-CCNC: 56 U/L (ref 30–99)
ALT SERPL-CCNC: 23 U/L (ref 2–50)
ANION GAP SERPL CALC-SCNC: 12 MMOL/L (ref 0–11.9)
AST SERPL-CCNC: 23 U/L (ref 12–45)
BASOPHILS # BLD AUTO: 0.6 % (ref 0–1.8)
BASOPHILS # BLD: 0.03 K/UL (ref 0–0.12)
BILIRUB SERPL-MCNC: 0.8 MG/DL (ref 0.1–1.5)
BUN SERPL-MCNC: 14 MG/DL (ref 8–22)
CALCIUM SERPL-MCNC: 10.1 MG/DL (ref 8.5–10.5)
CHLORIDE SERPL-SCNC: 104 MMOL/L (ref 96–112)
CHOLEST SERPL-MCNC: 252 MG/DL (ref 100–199)
CO2 SERPL-SCNC: 24 MMOL/L (ref 20–33)
CREAT SERPL-MCNC: 0.64 MG/DL (ref 0.5–1.4)
CRP SERPL HS-MCNC: 4 MG/L (ref 0–7.5)
EOSINOPHIL # BLD AUTO: 0.05 K/UL (ref 0–0.51)
EOSINOPHIL NFR BLD: 1 % (ref 0–6.9)
ERYTHROCYTE [DISTWIDTH] IN BLOOD BY AUTOMATED COUNT: 45.3 FL (ref 35.9–50)
FASTING STATUS PATIENT QL REPORTED: NORMAL
GLOBULIN SER CALC-MCNC: 2.8 G/DL (ref 1.9–3.5)
GLUCOSE SERPL-MCNC: 87 MG/DL (ref 65–99)
HCT VFR BLD AUTO: 42.8 % (ref 37–47)
HDLC SERPL-MCNC: 80 MG/DL
HGB BLD-MCNC: 13.5 G/DL (ref 12–16)
IMM GRANULOCYTES # BLD AUTO: 0.01 K/UL (ref 0–0.11)
IMM GRANULOCYTES NFR BLD AUTO: 0.2 % (ref 0–0.9)
LDLC SERPL CALC-MCNC: 152 MG/DL
LYMPHOCYTES # BLD AUTO: 1.6 K/UL (ref 1–4.8)
LYMPHOCYTES NFR BLD: 31.5 % (ref 22–41)
MCH RBC QN AUTO: 29.7 PG (ref 27–33)
MCHC RBC AUTO-ENTMCNC: 31.5 G/DL (ref 33.6–35)
MCV RBC AUTO: 94.1 FL (ref 81.4–97.8)
MONOCYTES # BLD AUTO: 0.28 K/UL (ref 0–0.85)
MONOCYTES NFR BLD AUTO: 5.5 % (ref 0–13.4)
NEUTROPHILS # BLD AUTO: 3.11 K/UL (ref 2–7.15)
NEUTROPHILS NFR BLD: 61.2 % (ref 44–72)
NRBC # BLD AUTO: 0 K/UL
NRBC BLD-RTO: 0 /100 WBC
PLATELET # BLD AUTO: 278 K/UL (ref 164–446)
PMV BLD AUTO: 12.5 FL (ref 9–12.9)
POTASSIUM SERPL-SCNC: 4.3 MMOL/L (ref 3.6–5.5)
PROT SERPL-MCNC: 7.8 G/DL (ref 6–8.2)
RBC # BLD AUTO: 4.55 M/UL (ref 4.2–5.4)
SODIUM SERPL-SCNC: 140 MMOL/L (ref 135–145)
TRIGL SERPL-MCNC: 99 MG/DL (ref 0–149)
WBC # BLD AUTO: 5.1 K/UL (ref 4.8–10.8)

## 2019-09-05 PROCEDURE — 86141 C-REACTIVE PROTEIN HS: CPT

## 2019-09-05 PROCEDURE — 36415 COLL VENOUS BLD VENIPUNCTURE: CPT

## 2019-09-05 PROCEDURE — 83704 LIPOPROTEIN BLD QUAN PART: CPT

## 2019-09-05 PROCEDURE — 80053 COMPREHEN METABOLIC PANEL: CPT

## 2019-09-05 PROCEDURE — 82306 VITAMIN D 25 HYDROXY: CPT

## 2019-09-05 PROCEDURE — 80061 LIPID PANEL: CPT

## 2019-09-05 PROCEDURE — 85025 COMPLETE CBC W/AUTO DIFF WBC: CPT

## 2019-09-09 LAB
CHOLEST SERPL-MCNC: 241 MG/DL
HDL PARTICAL NO Q4363: >41 UMOL/L
HDL SIZE Q4361: 9 NM
HDLC SERPL-MCNC: 78 MG/DL (ref 40–59)
HLD.LARGE SERPL-SCNC: 9.2 UMOL/L
L VLDL PART NO Q4357: 4 NMOL/L
LDL SERPL QN: 21.2 NM
LDL SERPL-SCNC: 1726 NMOL/L
LDL SMALL SERPL-SCNC: 572 NMOL/L
LDLC SERPL CALC-MCNC: 143 MG/DL
PATHOLOGY STUDY: ABNORMAL
TRIGL SERPL-MCNC: 100 MG/DL (ref 30–149)
VLDL SIZE Q4362: 48.8 NM

## 2019-09-18 ENCOUNTER — OFFICE VISIT (OUTPATIENT)
Dept: VASCULAR LAB | Facility: MEDICAL CENTER | Age: 59
End: 2019-09-18
Attending: INTERNAL MEDICINE
Payer: COMMERCIAL

## 2019-09-18 VITALS
HEIGHT: 63 IN | WEIGHT: 136 LBS | SYSTOLIC BLOOD PRESSURE: 125 MMHG | DIASTOLIC BLOOD PRESSURE: 52 MMHG | BODY MASS INDEX: 24.1 KG/M2 | HEART RATE: 66 BPM

## 2019-09-18 DIAGNOSIS — E55.9 VITAMIN D INSUFFICIENCY: ICD-10-CM

## 2019-09-18 DIAGNOSIS — E78.00 PURE HYPERCHOLESTEROLEMIA: ICD-10-CM

## 2019-09-18 DIAGNOSIS — I10 ESSENTIAL HYPERTENSION: ICD-10-CM

## 2019-09-18 PROCEDURE — 99214 OFFICE O/P EST MOD 30 MIN: CPT | Performed by: NURSE PRACTITIONER

## 2019-09-18 PROCEDURE — 99212 OFFICE O/P EST SF 10 MIN: CPT | Performed by: NURSE PRACTITIONER

## 2019-09-18 RX ORDER — EZETIMIBE 10 MG/1
10 TABLET ORAL DAILY
Qty: 90 TAB | Refills: 3 | Status: SHIPPED | OUTPATIENT
Start: 2019-09-18 | End: 2019-10-09

## 2019-09-18 ASSESSMENT — ENCOUNTER SYMPTOMS
FOCAL WEAKNESS: 0
DIZZINESS: 0
ORTHOPNEA: 0
SEIZURES: 0
NERVOUS/ANXIOUS: 0
DOUBLE VISION: 0
PALPITATIONS: 1
HEMOPTYSIS: 0
BLURRED VISION: 0
SORE THROAT: 0
DEPRESSION: 0
VOMITING: 0
FEVER: 0
CHILLS: 0
SHORTNESS OF BREATH: 0
WHEEZING: 0
NAUSEA: 0
MYALGIAS: 0
INSOMNIA: 0
HEADACHES: 0
ABDOMINAL PAIN: 0
BRUISES/BLEEDS EASILY: 0
COUGH: 0
TREMORS: 0
BLOOD IN STOOL: 0
DIARRHEA: 0
WEAKNESS: 0

## 2019-09-18 NOTE — PROGRESS NOTES
FOLLOW-UP VASCULAR MED VISIT  Subjective:   Danii Rodriguez is a 58 y.o. female who presents today 19 for   Chief Complaint   Patient presents with   • Follow-Up   Referred by Dr. Ralph (PCP) for evaluation and mgmt of HLD in context of prior statin intolerance    HPI:     Danii Rodriguez had been placed on rosuvastatin  Began to have depression and hopelessness about a month after starting, so severe she felt suicidal  Since she has stopped it-- has been back to normal  Has taken lipitor and crestor-- could not tolerate either due to bothersome side effects    Current HTN concerns: Denies   HTN sx:  No current blurred or changed vision, chest pain, shortness of breath, headache, nausea, dizziness/vertigo   Home BP los/60-70s, has known white coat effect   Adherence to current HTN meds: compliant all of the time     Antiplatelet/anticoagulation: No stopped as not in qualifying group    Hyperlipidemia: On Zetia  Current treatment: Zetia  Myalgias? No  Other adverse drug reactions? Yes, bothersome diarrhea, for past month, but has been slowly resolving    Pertinent HTN hx:   Age at HTN dx:  1 year ago   (if female, any hx of pregnancy-related HTN):  None   Past HTN medications: had been on metoprolol and felt fatigued and had to stop due to allergy testing and fatigue.  Currently lisinopril   HTN crises:  No prior hospitalization or crises, 2018 - admitted for elevated BP with negative cardiac w/u  ASCVD risk factors:     DM: No   CKD:  No  Lifestyle factors:     High salt: No   EtOH: wine, 6 drinks/week   Interfering substances:     NSAIDs: avoids    Decongestants. No, using allergy shots to help     ASCVD calculator (contraindicated if ASCVD+, [id number])   10yr-risk ca.7%     Clinical evidence of ASCVD:     1) hx of MI/ACS:  No   2) coronary or other revascularization procedure: No   3) TIA/ischemic CVA: No   4) PAD (including CLARIBEL <0.9): No   5) documented (CAD, Renal athero, AA due to  athero, carotid plaque >50% stenosis: No    Major RFs:     1) Age (Men>44, women>54):  yes   2) Fhx early CAD (<55 men, <65 women):  no   3) cigarette smoking:  No   4) high BP >139/89 or on tx: yes   5) Low HDL-C (<40 men, <50 women):  no    Social History     Tobacco Use   Smoking Status Former Smoker   • Last attempt to quit: 1976   • Years since quittin.6   Smokeless Tobacco Never Used     Social History     Tobacco Use   • Smoking status: Former Smoker     Last attempt to quit: 1976     Years since quittin.6   • Smokeless tobacco: Never Used   Substance Use Topics   • Alcohol use: Yes     Alcohol/week: 3.6 oz     Types: 6 Glasses of wine per week   • Drug use: No     Outpatient Encounter Medications as of 2019   Medication Sig Dispense Refill   • lisinopril (PRINIVIL) 20 MG Tab TAKE 1 TABLET BY MOUTH EVERY  Tab 3   • fluticasone (FLONASE) 50 MCG/ACT nasal spray SPRAY 2 SPRAYS IN NOSE EVERY DAY. 16 Bottle 3   • ezetimibe (ZETIA) 10 MG Tab Take 1 Tab by mouth every day. 90 Tab 3   • cholestyramine (QUESTRAN,PREVALITE) 4 GM Pack Take  by mouth 2 times a day.     • Diclofenac Sodium 1 % Gel Apply 2 gram on upper extremities twice a day as needed. 1 Tube 1   • ondansetron (ZOFRAN) 4 MG Tab tablet Take 1 Tab by mouth every four hours as needed for Nausea/Vomiting. 30 Tab 1   • meclizine (ANTIVERT) 25 MG Tab Take 1 Tab by mouth 3 times a day as needed. 30 Tab 0   • scopolamine (TRANSDERM-SCOP) 1 mg/72hr PATCH 72 HR Apply 1 Patch to skin as directed every 72 hours. 4 Patch 3   • coenzyme Q-10 30 MG capsule Take 60 mg by mouth every day.     • Multiple Vitamins-Minerals (WOMENS 50+ MULTI VITAMIN/MIN PO) Take  by mouth.     • albuterol 108 (90 Base) MCG/ACT Aero Soln inhalation aerosol Inhale 2 Puffs by mouth every 6 hours as needed for Shortness of Breath.     • TURMERIC PO Take 1 Tab by mouth every day.     • omeprazole (PRILOSEC) 40 MG delayed-release capsule Take 40 mg by mouth every  "day.  5   • Probiotic Product (PROBIOTIC COLON SUPPORT PO) Take 1 Cap by mouth every day.     • Cholecalciferol (VITAMIN D3) 2000 UNIT Cap Take 4,000 Units by mouth every day.       No facility-administered encounter medications on file as of 9/18/2019.      Allergies   Allergen Reactions   • Ciprofloxacin Hives     Okay to take cipro ear drops, no reaction with ear drop   • Latex Rash     Tape burns and rash- paper tape OK   • Other Environmental      seasonal   • Sulfa Drugs Itching     Not had but was told not to use   • Vicodin [Hydrocodone-Acetaminophen] Rash     Rash      DIET AND EXERCISE:  Weight Change: down 4 lbs    BMI Readings from Last 4 Encounters:   04/18/19 24.80 kg/m²   03/28/19 24.80 kg/m²   12/11/18 24.27 kg/m²   11/28/18 24.98 kg/m²     Diet: low fat  Exercise: minimal exercise     Review of Systems   Constitutional: Negative for chills, fever and malaise/fatigue.   HENT: Negative for nosebleeds, sore throat and tinnitus.    Eyes: Negative for blurred vision and double vision.   Respiratory: Negative for cough, hemoptysis, shortness of breath and wheezing.    Cardiovascular: Positive for palpitations. Negative for chest pain, orthopnea and leg swelling.   Gastrointestinal: Negative for abdominal pain, blood in stool, diarrhea, melena, nausea and vomiting.   Genitourinary: Negative for hematuria.   Musculoskeletal: Negative for joint pain and myalgias.   Skin: Negative for itching and rash.   Neurological: Negative for dizziness, tremors, focal weakness, seizures, weakness and headaches.   Endo/Heme/Allergies: Does not bruise/bleed easily.   Psychiatric/Behavioral: Negative for depression. The patient is not nervous/anxious and does not have insomnia.       Objective:     Vitals:    09/18/19 1415   BP: 125/52   BP Location: Left arm   Patient Position: Sitting   BP Cuff Size: Adult   Pulse: 66   Weight: 61.7 kg (136 lb)   Height: 1.6 m (5' 3\")      BP Readings from Last 4 Encounters:   09/18/19 " 125/52   06/19/19 112/45   05/10/19 128/70   04/18/19 135/60     Body mass index is 24.09 kg/m².     Physical Exam   Constitutional: She is oriented to person, place, and time. She appears well-developed and well-nourished. No distress.   Cardiovascular: Normal rate, regular rhythm, normal heart sounds and intact distal pulses. Exam reveals no gallop and no friction rub.   No murmur heard.  Pulses:       Carotid pulses are 2+ on the right side, and 2+ on the left side.       Radial pulses are 2+ on the right side, and 2+ on the left side.        Dorsalis pedis pulses are 2+ on the right side, and 2+ on the left side.        Posterior tibial pulses are 2+ on the right side, and 2+ on the left side.   Edema     RLE: none     LLE: none   Spider telangectasia:       RLE:  None      LLE: none   Varicosities:         RLE: none      LLE: none   Corona phlebectatica:      RLE:  None       LLE:  None   Cording:         RLE:  None     LLE: None      Pulmonary/Chest: Effort normal and breath sounds normal.   Musculoskeletal: Normal range of motion. She exhibits no edema or tenderness.   Neurological: She is alert and oriented to person, place, and time. She exhibits normal muscle tone. Coordination normal.   Skin: Skin is warm and dry. She is not diaphoretic.   Psychiatric: She has a normal mood and affect.     Lab Results   Component Value Date    CHOLSTRLTOT 252 (H) 09/05/2019     (H) 09/05/2019    HDL 80 09/05/2019    TRIGLYCERIDE 99 09/05/2019    LDLPART 21.2 09/05/2019    LDLPART 1726 (H) 09/05/2019    SMLLDL 572 09/05/2019    LHDLPART 9.2 09/05/2019    LVLDLPT 4.0 (H) 09/05/2019    LDLCHOL 143 (H) 09/05/2019    HDLSIZE 9.0 09/05/2019    VLDLSIZE 48.8 (H) 09/05/2019    HDLPART >41.0 09/05/2019      Lab Results   Component Value Date    PROTHROMBTM 12.1 06/05/2018    INR 0.90 06/05/2018       Lab Results   Component Value Date    HBA1C 5.6 09/20/2018      Lab Results   Component Value Date    SODIUM 140 09/05/2019     POTASSIUM 4.3 09/05/2019    CHLORIDE 104 09/05/2019    CO2 24 09/05/2019    GLUCOSE 87 09/05/2019    BUN 14 09/05/2019    CREATININE 0.64 09/05/2019    IFAFRICA >60 09/05/2019    IFNOTAFR >60 09/05/2019        Lab Results   Component Value Date    WBC 5.1 09/05/2019    RBC 4.55 09/05/2019    HEMOGLOBIN 13.5 09/05/2019    HEMATOCRIT 42.8 09/05/2019    MCV 94.1 09/05/2019    MCH 29.7 09/05/2019    MCHC 31.5 (L) 09/05/2019    MPV 12.5 09/05/2019      VASCULAR IMAGING:     MPI 6/5/18   NUCLEAR IMAGING INTERPRETATION   Normal left ventricular size, ejection fraction, and wall motion.   No evidence of significant jeopardized viable myocardium or prior myocardial    infarction.   ECG INTERPRETATION   Negative stress ECG for ischemia.    Medical Decision Making:  Today's Assessment / Status / Plan:     1. Pure hypercholesterolemia     2. Essential hypertension     3. Vitamin D insufficiency       Patient Type: Primary Prevention    Etiology of Established CVD if Present: n/a    Lipid Management: Qualifies for Statin Therapy Based on 2013 ACC/AHA Guidelines: yes  Calculated 10-Year Risk of ASCVD: 4.0%  Currently on Statin: Yes  TSH normal.  Multiple statin intolerance in past including Lipitor, Crestor and Prava.   NLA Risk Category:   Moderate: 2 major RFs, risk scoring >10%, other risk scoring (CAC, advanced lipid, hsCRP)  Tx threshold:  non-HDL-C >159, LDL-C >129  Tx goal:  non-HDL <130, LDL-C <100 (optional: apoB<90)   At goal:  No  Discussed option of trying Livalo but patient not willing to trial any other statins.  Potential for Zetia to decrease her LDL 20-30%, plus TLC could likely get her levels to goal.    Interestingly her lipid panel shows an increase of her LDL by about 25 pts despite starting Zetia.  She states the diarrhea just recently began to resolve.  I recommended she continue Zetia currently, which she is willing to do since her body seems to have adjusted and can likely now get full absorption of  drug.  Also restart Cholestoff and recheck lipid panel in 6 weeks.     Tx plan:  - Intensify TLC  - Continue Zetia 10mg daily   - Continue Vit D3 4000 units daily   - Continue cholestoff   - Recheck lab work prior to next appt    Blood Pressure Management:Goal: ACC/AHA (2017) goal <130/80  Home BP at goal:  yes  Office BP at goal:  yes  Plan:   - Continue home BP monitoring, reviewed correct technique:  Yes     Glycemic Status: Diabetic  - Check annually     Anti-Platelet/Anti-Coagulant Tx: Not in qualifying group based on recent literature    Smoking: continued complete avoidance of all tobacco products      Physical Activity: advised to engage in walking >150min/week, most days     Weight Management and Nutrition: Dietary plan was discussed with patient at this visit including plate method, reduced kcal.     Other:   1) Seasonal allergies - continue with Dr. Berry     Instructed to follow-up with PCP for remainder of adult medical needs: yes  We will partner with other providers in the management of established vascular disease and cardiometabolic risk factors.    Studies to Be Obtained:  None   Labs to Be Obtained: As above prior to next appt    Follow up in:  6 months, repeat labs in 6 weeks      NAV Beauchamp.

## 2019-09-19 DIAGNOSIS — E78.00 PURE HYPERCHOLESTEROLEMIA: ICD-10-CM

## 2019-10-03 ENCOUNTER — OFFICE VISIT (OUTPATIENT)
Dept: MEDICAL GROUP | Age: 59
End: 2019-10-03
Payer: COMMERCIAL

## 2019-10-03 VITALS
SYSTOLIC BLOOD PRESSURE: 104 MMHG | WEIGHT: 136.8 LBS | BODY MASS INDEX: 24.24 KG/M2 | OXYGEN SATURATION: 99 % | DIASTOLIC BLOOD PRESSURE: 56 MMHG | TEMPERATURE: 98.7 F | HEIGHT: 63 IN | HEART RATE: 62 BPM

## 2019-10-03 DIAGNOSIS — E78.00 PURE HYPERCHOLESTEROLEMIA: ICD-10-CM

## 2019-10-03 DIAGNOSIS — E55.9 VITAMIN D INSUFFICIENCY: ICD-10-CM

## 2019-10-03 DIAGNOSIS — R25.2 BILATERAL LEG CRAMPS: ICD-10-CM

## 2019-10-03 DIAGNOSIS — Z11.59 NEED FOR HEPATITIS C SCREENING TEST: ICD-10-CM

## 2019-10-03 DIAGNOSIS — I10 ESSENTIAL HYPERTENSION: ICD-10-CM

## 2019-10-03 DIAGNOSIS — Z23 NEED FOR VACCINATION: ICD-10-CM

## 2019-10-03 PROCEDURE — 90471 IMMUNIZATION ADMIN: CPT | Performed by: INTERNAL MEDICINE

## 2019-10-03 PROCEDURE — 99214 OFFICE O/P EST MOD 30 MIN: CPT | Mod: 25 | Performed by: INTERNAL MEDICINE

## 2019-10-03 PROCEDURE — 90686 IIV4 VACC NO PRSV 0.5 ML IM: CPT | Performed by: INTERNAL MEDICINE

## 2019-10-03 SDOH — HEALTH STABILITY: MENTAL HEALTH: HOW OFTEN DO YOU HAVE 6 OR MORE DRINKS ON ONE OCCASION?: NEVER

## 2019-10-03 SDOH — HEALTH STABILITY: MENTAL HEALTH: HOW MANY STANDARD DRINKS CONTAINING ALCOHOL DO YOU HAVE ON A TYPICAL DAY?: 1 OR 2

## 2019-10-03 SDOH — HEALTH STABILITY: MENTAL HEALTH: HOW OFTEN DO YOU HAVE A DRINK CONTAINING ALCOHOL?: 4 OR MORE TIMES A WEEK

## 2019-10-03 ASSESSMENT — PAIN SCALES - GENERAL: PAINLEVEL: NO PAIN

## 2019-10-03 NOTE — PROGRESS NOTES
Subjective:   Danii Rodriguez is a 58 y.o. female here today for evaluation and management of:    Pure hypercholesterolemia  The patient was having side effects from the Pravastatin which she stopped in May 2019. She is now taking Ezetimibe 10 mg once a day and cholestoff 1 tablet twice a day. She had vomiting and diarrhea when she started Cholestoff initially and bowel symptoms resolved now and she can tolerate Cholestoff 1 tablet twice a day. She follows up with Dr. Neal, vascular medicine for her cholesterol treatment.  She tried atorvastatin, rosuvastatin, pravastatin in the past and she did not tolerate statin.  She reported fatigue, tired, muscle cramp from taking statin.    I discussed the blood test with the patient in clinic today    Results for DANII RODRIGUEZ (MRN [id number]) as of 10/3/2019 11:18   Ref. Range 3/26/2019 07:34 9/5/2019 10:46 9/5/2019 10:49   Cholesterol,Tot Latest Ref Range: 100 - 199 mg/dL 218 (H) 241 (H) 252 (H)   Triglycerides Latest Ref Range: 0 - 149 mg/dL 159 (H) 100 99   HDL Latest Ref Range: >=40 mg/dL 61 78 (H) 80       Essential hypertension  Stable. Monitoring BP at home. Currently taking Lisinopril 20 mg daily as directed. Denies lightheadedness, vision changes, headache, palpitations or leg swelling.     Vitamin D insufficiency  She takes 4000 IU's of OTC Vitamin D supplementation. Blood work was completed prior to this visit.    I discussed the blood test with the patient in clinic today    Results for DANII RODRIGUEZ (MRN [id number]) as of 10/3/2019 11:18   Ref. Range 9/5/2019 10:46   25-Hydroxy   Vitamin D 25 Latest Ref Range: 30 - 100 ng/mL 39       Bilateral leg cramps  Chronic. The patient has ongoing leg cramps. She notes that she has not been drinking as much water as normal. She does not want to take medication for this but would like tips to decrease the amount of cramps she gets. The patient notes that she has a standing desk at work and tends to experience  more cramps while she is standing at her desk.      Current medicines (including changes today)  Current Outpatient Medications   Medication Sig Dispense Refill   • Plant Sterols and Stanols (CHOLESTOFF PO) Take 1 Tab by mouth 2 Times a Day.     • ezetimibe (ZETIA) 10 MG Tab Take 1 Tab by mouth every day. 90 Tab 3   • lisinopril (PRINIVIL) 20 MG Tab TAKE 1 TABLET BY MOUTH EVERY  Tab 3   • fluticasone (FLONASE) 50 MCG/ACT nasal spray SPRAY 2 SPRAYS IN NOSE EVERY DAY. 16 Bottle 3   • Diclofenac Sodium 1 % Gel Apply 2 gram on upper extremities twice a day as needed. 1 Tube 1   • ondansetron (ZOFRAN) 4 MG Tab tablet Take 1 Tab by mouth every four hours as needed for Nausea/Vomiting. 30 Tab 1   • meclizine (ANTIVERT) 25 MG Tab Take 1 Tab by mouth 3 times a day as needed. 30 Tab 0   • scopolamine (TRANSDERM-SCOP) 1 mg/72hr PATCH 72 HR Apply 1 Patch to skin as directed every 72 hours. 4 Patch 3   • coenzyme Q-10 30 MG capsule Take 60 mg by mouth every day.     • Multiple Vitamins-Minerals (WOMENS 50+ MULTI VITAMIN/MIN PO) Take  by mouth.     • albuterol 108 (90 Base) MCG/ACT Aero Soln inhalation aerosol Inhale 2 Puffs by mouth every 6 hours as needed for Shortness of Breath.     • omeprazole (PRILOSEC) 40 MG delayed-release capsule Take 40 mg by mouth every day.  5   • Probiotic Product (PROBIOTIC COLON SUPPORT PO) Take 1 Cap by mouth every day.     • Cholecalciferol (VITAMIN D3) 2000 UNIT Cap Take 4,000 Units by mouth every day.     • TURMERIC PO Take 1 Tab by mouth every day.       No current facility-administered medications for this visit.      She  has a past medical history of Abdominal pain, Allergic rhinitis, Anesthesia, Back pain, Blood in urine, Chest tightness, Constipation, Cough, Daytime sleepiness, Diarrhea, Earache, Eye drainage, Fatigue, Frequent headaches, GERD (gastroesophageal reflux disease), Heartburn, High cholesterol, Hoarseness, persistent, Hypertension, Indigestion, Irregular periods,  "Morning headache, MRSA (methicillin resistant staph aureus) culture positive (2010), Nasal drainage, Painful joint, Painful urination, Palpitations, Restless leg syndrome, Rhinitis, Ringing in ears, Sore muscles, Swelling of lower extremity, Unspecified disorder of thyroid, Unspecified urinary incontinence, and Wears glasses.    ROS   No chest pain, no shortness of breath, no abdominal pain       Objective:     /56 (BP Location: Right arm, Patient Position: Sitting, BP Cuff Size: Adult)   Pulse 62   Temp 37.1 °C (98.7 °F) (Temporal)   Ht 1.6 m (5' 3\")   Wt 62.1 kg (136 lb 12.8 oz)   SpO2 99%  Body mass index is 24.23 kg/m².   Physical Exam:  General: Alert, oriented and no acute distress.  Eye contact is good, speech goal directed, affect calm  HEENT: conjunctiva non-injected, sclera non-icteric.  Oral mucous membranes pink and moist with no lesions.  Pinna normal.   Lungs: Normal respiratory effort, clear to auscultation bilaterally with good excursion.  CV: regular rate and rhythm. No murmurs.  Abdomen: soft, non distended, nontender, Bowel sound normal.  Ext: no edema, color normal, vascularity normal, temperature normal      Assessment and Plan:   The following treatment plan was discussed     1. Pure hypercholesterolemia  - Well-controlled. Continue current regimens as instructed by vascular medicine. Recheck lab 1-2 weeks before next follow up visit.  - Reviewed the risks and benefits of treatment and potential side effects of medication.  - Advised to eat low fat, low carbohydrate and high fiber diet as well as do cardio physical exercise regularly.   -Continues to follow with vascular medicine.    2. Essential hypertension  - Well-controlled. Continue current regimens, lisinopril 20 mg daily. Recheck lab 1-2 weeks before next follow up visit.  - Reviewed the risks and benefits as well as potential side effects of medications with patient.  - Discussed to eat low-sodium diet and encouraged to do " regular physical exercise.  - Recommend to monitor blood pressure and heart rate at home.     3. Vitamin D insufficiency  - Patient's Vitamin D level is within the target range at 39. Advised to continue current OTC supplementation of 4000 IU's daily.     4. Bilateral leg cramps  - Advised the patient to stay hydrated and stretch to prevent leg pain and cramps.  Advised to keep warm on both feet and lower extremities.  - Discussed that she can use heat, OTC heating creams, and magnesium.  We also discussed to try potassium rich diet.    5. Need for hepatitis C screening test  - Because the patient was born between 1947 and 1967, it recommends a one time hepatitis C screening. The patient agrees to have this completed. Denies family history of Hep C.  - HEP C VIRUS ANTIBODY; Future    6. Need for vaccination  - Influenza vaccine was given today after reviewing risks and benefits as well as side effects of vaccine.   - Influenza Vaccine Quad Injection (PF)    7. Health Maintenance   - Patient is advised to receive shingles vaccine at local pharmacy due to clinic shortage    Followup: Return in about 6 months (around 4/3/2020), or if symptoms worsen or fail to improve, for Hyperlipidemia, Hypertension, Vitamin D insufficiency, Lab review.     Please note that this dictation was created using voice recognition software. I have made every reasonable attempt to correct obvious errors, but I expect that there may have unintended errors in text, spelling, punctuation, or grammar that I did not discover.         I, Lashell Rubio (Scribe), am scribing for, and in the presence of, Juany Ralph M.D.. Electronically signed by: Lashell Rubio (Jeyson), 10/3/19.    IJuany M.D., personally performed the services described in this documentation, as scribed by Lashell Rubio in my presence, and it is both accurate and complete.

## 2019-10-09 ENCOUNTER — HOSPITAL ENCOUNTER (OUTPATIENT)
Facility: MEDICAL CENTER | Age: 59
End: 2019-10-10
Attending: EMERGENCY MEDICINE | Admitting: HOSPITALIST
Payer: COMMERCIAL

## 2019-10-09 ENCOUNTER — APPOINTMENT (OUTPATIENT)
Dept: CARDIOLOGY | Facility: MEDICAL CENTER | Age: 59
End: 2019-10-09
Attending: HOSPITALIST
Payer: COMMERCIAL

## 2019-10-09 ENCOUNTER — APPOINTMENT (OUTPATIENT)
Dept: RADIOLOGY | Facility: MEDICAL CENTER | Age: 59
End: 2019-10-09
Attending: EMERGENCY MEDICINE
Payer: COMMERCIAL

## 2019-10-09 ENCOUNTER — APPOINTMENT (OUTPATIENT)
Dept: RADIOLOGY | Facility: MEDICAL CENTER | Age: 59
End: 2019-10-09
Attending: HOSPITALIST
Payer: COMMERCIAL

## 2019-10-09 DIAGNOSIS — G93.40 ACUTE ENCEPHALOPATHY: ICD-10-CM

## 2019-10-09 DIAGNOSIS — R11.2 NON-INTRACTABLE VOMITING WITH NAUSEA, UNSPECIFIED VOMITING TYPE: ICD-10-CM

## 2019-10-09 PROBLEM — G45.4 TRANSIENT GLOBAL AMNESIA: Status: ACTIVE | Noted: 2019-10-09

## 2019-10-09 PROBLEM — R59.0 MEDIASTINAL ADENOPATHY: Status: ACTIVE | Noted: 2019-10-09

## 2019-10-09 LAB
ABO + RH BLD: NORMAL
ABO GROUP BLD: NORMAL
ALBUMIN SERPL BCP-MCNC: 4.4 G/DL (ref 3.2–4.9)
ALBUMIN/GLOB SERPL: 2.2 G/DL
ALP SERPL-CCNC: 54 U/L (ref 30–99)
ALT SERPL-CCNC: 24 U/L (ref 2–50)
AMMONIA PLAS-SCNC: 41 UMOL/L (ref 11–45)
ANION GAP SERPL CALC-SCNC: 9 MMOL/L (ref 0–11.9)
APPEARANCE UR: CLEAR
APTT PPP: 25 SEC (ref 24.7–36)
AST SERPL-CCNC: 28 U/L (ref 12–45)
BASOPHILS # BLD AUTO: 0.4 % (ref 0–1.8)
BASOPHILS # BLD: 0.03 K/UL (ref 0–0.12)
BILIRUB SERPL-MCNC: 0.4 MG/DL (ref 0.1–1.5)
BILIRUB UR QL STRIP.AUTO: NEGATIVE
BLD GP AB SCN SERPL QL: NORMAL
BUN SERPL-MCNC: 14 MG/DL (ref 8–22)
CALCIUM SERPL-MCNC: 8.6 MG/DL (ref 8.5–10.5)
CHLORIDE SERPL-SCNC: 109 MMOL/L (ref 96–112)
CO2 SERPL-SCNC: 24 MMOL/L (ref 20–33)
COLOR UR: YELLOW
CREAT SERPL-MCNC: 0.7 MG/DL (ref 0.5–1.4)
EOSINOPHIL # BLD AUTO: 0.07 K/UL (ref 0–0.51)
EOSINOPHIL NFR BLD: 0.9 % (ref 0–6.9)
ERYTHROCYTE [DISTWIDTH] IN BLOOD BY AUTOMATED COUNT: 43.4 FL (ref 35.9–50)
EST. AVERAGE GLUCOSE BLD GHB EST-MCNC: 103 MG/DL
GLOBULIN SER CALC-MCNC: 2 G/DL (ref 1.9–3.5)
GLUCOSE SERPL-MCNC: 94 MG/DL (ref 65–99)
GLUCOSE UR STRIP.AUTO-MCNC: NEGATIVE MG/DL
HBA1C MFR BLD: 5.2 % (ref 0–5.6)
HCT VFR BLD AUTO: 38.5 % (ref 37–47)
HGB BLD-MCNC: 13.3 G/DL (ref 12–16)
IMM GRANULOCYTES # BLD AUTO: 0.04 K/UL (ref 0–0.11)
IMM GRANULOCYTES NFR BLD AUTO: 0.5 % (ref 0–0.9)
INR PPP: 0.86 (ref 0.87–1.13)
KETONES UR STRIP.AUTO-MCNC: NEGATIVE MG/DL
LEUKOCYTE ESTERASE UR QL STRIP.AUTO: NEGATIVE
LV EJECT FRACT  99904: 60
LV EJECT FRACT MOD 2C 99903: 79.97
LV EJECT FRACT MOD 4C 99902: 66.56
LV EJECT FRACT MOD BP 99901: 73.99
LYMPHOCYTES # BLD AUTO: 1.9 K/UL (ref 1–4.8)
LYMPHOCYTES NFR BLD: 23.7 % (ref 22–41)
MCH RBC QN AUTO: 31.6 PG (ref 27–33)
MCHC RBC AUTO-ENTMCNC: 34.5 G/DL (ref 33.6–35)
MCV RBC AUTO: 91.4 FL (ref 81.4–97.8)
MICRO URNS: NORMAL
MONOCYTES # BLD AUTO: 0.39 K/UL (ref 0–0.85)
MONOCYTES NFR BLD AUTO: 4.9 % (ref 0–13.4)
NEUTROPHILS # BLD AUTO: 5.6 K/UL (ref 2–7.15)
NEUTROPHILS NFR BLD: 69.6 % (ref 44–72)
NITRITE UR QL STRIP.AUTO: NEGATIVE
NRBC # BLD AUTO: 0 K/UL
NRBC BLD-RTO: 0 /100 WBC
PH UR STRIP.AUTO: 5.5 [PH] (ref 5–8)
PLATELET # BLD AUTO: 229 K/UL (ref 164–446)
PMV BLD AUTO: 10.3 FL (ref 9–12.9)
POTASSIUM SERPL-SCNC: 3.9 MMOL/L (ref 3.6–5.5)
PROT SERPL-MCNC: 6.4 G/DL (ref 6–8.2)
PROT UR QL STRIP: NEGATIVE MG/DL
PROTHROMBIN TIME: 11.9 SEC (ref 12–14.6)
RBC # BLD AUTO: 4.21 M/UL (ref 4.2–5.4)
RBC UR QL AUTO: NEGATIVE
RH BLD: NORMAL
SODIUM SERPL-SCNC: 142 MMOL/L (ref 135–145)
SP GR UR STRIP.AUTO: <=1.005
TROPONIN T SERPL-MCNC: <6 NG/L (ref 6–19)
TSH SERPL DL<=0.005 MIU/L-ACNC: 3.13 UIU/ML (ref 0.38–5.33)
UROBILINOGEN UR STRIP.AUTO-MCNC: 0.2 MG/DL
VIT B12 SERPL-MCNC: 1156 PG/ML (ref 211–911)
WBC # BLD AUTO: 8 K/UL (ref 4.8–10.8)

## 2019-10-09 PROCEDURE — 71045 X-RAY EXAM CHEST 1 VIEW: CPT

## 2019-10-09 PROCEDURE — 36415 COLL VENOUS BLD VENIPUNCTURE: CPT

## 2019-10-09 PROCEDURE — 85730 THROMBOPLASTIN TIME PARTIAL: CPT

## 2019-10-09 PROCEDURE — 85610 PROTHROMBIN TIME: CPT

## 2019-10-09 PROCEDURE — A9270 NON-COVERED ITEM OR SERVICE: HCPCS | Performed by: HOSPITALIST

## 2019-10-09 PROCEDURE — 70498 CT ANGIOGRAPHY NECK: CPT

## 2019-10-09 PROCEDURE — 96374 THER/PROPH/DIAG INJ IV PUSH: CPT

## 2019-10-09 PROCEDURE — 93306 TTE W/DOPPLER COMPLETE: CPT

## 2019-10-09 PROCEDURE — 99285 EMERGENCY DEPT VISIT HI MDM: CPT

## 2019-10-09 PROCEDURE — 700102 HCHG RX REV CODE 250 W/ 637 OVERRIDE(OP): Performed by: HOSPITALIST

## 2019-10-09 PROCEDURE — 96375 TX/PRO/DX INJ NEW DRUG ADDON: CPT

## 2019-10-09 PROCEDURE — 85025 COMPLETE CBC W/AUTO DIFF WBC: CPT

## 2019-10-09 PROCEDURE — 86850 RBC ANTIBODY SCREEN: CPT

## 2019-10-09 PROCEDURE — 82140 ASSAY OF AMMONIA: CPT

## 2019-10-09 PROCEDURE — 0042T CT-CEREBRAL PERFUSION ANALYSIS: CPT

## 2019-10-09 PROCEDURE — 70450 CT HEAD/BRAIN W/O DYE: CPT

## 2019-10-09 PROCEDURE — 700105 HCHG RX REV CODE 258: Performed by: EMERGENCY MEDICINE

## 2019-10-09 PROCEDURE — G0378 HOSPITAL OBSERVATION PER HR: HCPCS

## 2019-10-09 PROCEDURE — 86901 BLOOD TYPING SEROLOGIC RH(D): CPT

## 2019-10-09 PROCEDURE — 93306 TTE W/DOPPLER COMPLETE: CPT | Mod: 26 | Performed by: INTERNAL MEDICINE

## 2019-10-09 PROCEDURE — 700117 HCHG RX CONTRAST REV CODE 255

## 2019-10-09 PROCEDURE — 82607 VITAMIN B-12: CPT

## 2019-10-09 PROCEDURE — 86900 BLOOD TYPING SEROLOGIC ABO: CPT

## 2019-10-09 PROCEDURE — 84443 ASSAY THYROID STIM HORMONE: CPT

## 2019-10-09 PROCEDURE — 700117 HCHG RX CONTRAST REV CODE 255: Performed by: EMERGENCY MEDICINE

## 2019-10-09 PROCEDURE — 99220 PR INITIAL OBSERVATION CARE,LEVL III: CPT | Performed by: HOSPITALIST

## 2019-10-09 PROCEDURE — 70551 MRI BRAIN STEM W/O DYE: CPT

## 2019-10-09 PROCEDURE — 84484 ASSAY OF TROPONIN QUANT: CPT

## 2019-10-09 PROCEDURE — 83036 HEMOGLOBIN GLYCOSYLATED A1C: CPT

## 2019-10-09 PROCEDURE — 700111 HCHG RX REV CODE 636 W/ 250 OVERRIDE (IP): Performed by: EMERGENCY MEDICINE

## 2019-10-09 PROCEDURE — 700111 HCHG RX REV CODE 636 W/ 250 OVERRIDE (IP)

## 2019-10-09 PROCEDURE — 84425 ASSAY OF VITAMIN B-1: CPT

## 2019-10-09 PROCEDURE — 80053 COMPREHEN METABOLIC PANEL: CPT

## 2019-10-09 PROCEDURE — 70496 CT ANGIOGRAPHY HEAD: CPT

## 2019-10-09 PROCEDURE — 94760 N-INVAS EAR/PLS OXIMETRY 1: CPT

## 2019-10-09 PROCEDURE — 81003 URINALYSIS AUTO W/O SCOPE: CPT

## 2019-10-09 PROCEDURE — 93005 ELECTROCARDIOGRAM TRACING: CPT

## 2019-10-09 RX ORDER — MECLIZINE HYDROCHLORIDE 25 MG/1
25 TABLET ORAL 3 TIMES DAILY PRN
COMMUNITY
End: 2020-01-30

## 2019-10-09 RX ORDER — ASPIRIN 325 MG
325 TABLET ORAL DAILY
Status: DISCONTINUED | OUTPATIENT
Start: 2019-10-09 | End: 2019-10-10 | Stop reason: HOSPADM

## 2019-10-09 RX ORDER — ONDANSETRON 2 MG/ML
INJECTION INTRAMUSCULAR; INTRAVENOUS
Status: COMPLETED
Start: 2019-10-09 | End: 2019-10-09

## 2019-10-09 RX ORDER — ONDANSETRON 4 MG/1
4 TABLET, FILM COATED ORAL EVERY 4 HOURS PRN
COMMUNITY
End: 2020-01-30

## 2019-10-09 RX ORDER — METOCLOPRAMIDE HYDROCHLORIDE 5 MG/ML
10 INJECTION INTRAMUSCULAR; INTRAVENOUS ONCE
Status: COMPLETED | OUTPATIENT
Start: 2019-10-09 | End: 2019-10-09

## 2019-10-09 RX ORDER — SODIUM CHLORIDE 9 MG/ML
1000 INJECTION, SOLUTION INTRAVENOUS ONCE
Status: COMPLETED | OUTPATIENT
Start: 2019-10-09 | End: 2019-10-09

## 2019-10-09 RX ORDER — FLUTICASONE PROPIONATE 50 MCG
2 SPRAY, SUSPENSION (ML) NASAL
COMMUNITY

## 2019-10-09 RX ORDER — ASPIRIN 300 MG/1
300 SUPPOSITORY RECTAL DAILY
Status: DISCONTINUED | OUTPATIENT
Start: 2019-10-09 | End: 2019-10-10 | Stop reason: HOSPADM

## 2019-10-09 RX ORDER — LISINOPRIL 20 MG/1
20 TABLET ORAL EVERY MORNING
COMMUNITY
End: 2020-11-18

## 2019-10-09 RX ORDER — ONDANSETRON 2 MG/ML
4 INJECTION INTRAMUSCULAR; INTRAVENOUS ONCE
Status: COMPLETED | OUTPATIENT
Start: 2019-10-09 | End: 2019-10-09

## 2019-10-09 RX ORDER — EZETIMIBE 10 MG/1
10 TABLET ORAL DAILY
Status: DISCONTINUED | OUTPATIENT
Start: 2019-10-09 | End: 2019-10-10 | Stop reason: HOSPADM

## 2019-10-09 RX ORDER — BISACODYL 10 MG
10 SUPPOSITORY, RECTAL RECTAL
Status: DISCONTINUED | OUTPATIENT
Start: 2019-10-09 | End: 2019-10-10 | Stop reason: HOSPADM

## 2019-10-09 RX ORDER — SCOLOPAMINE TRANSDERMAL SYSTEM 1 MG/1
1 PATCH, EXTENDED RELEASE TRANSDERMAL
COMMUNITY
End: 2022-03-04

## 2019-10-09 RX ORDER — LABETALOL HYDROCHLORIDE 5 MG/ML
10 INJECTION, SOLUTION INTRAVENOUS EVERY 4 HOURS PRN
Status: DISCONTINUED | OUTPATIENT
Start: 2019-10-09 | End: 2019-10-10 | Stop reason: HOSPADM

## 2019-10-09 RX ORDER — ATORVASTATIN CALCIUM 80 MG/1
80 TABLET, FILM COATED ORAL EVERY EVENING
Status: DISCONTINUED | OUTPATIENT
Start: 2019-10-09 | End: 2019-10-09

## 2019-10-09 RX ORDER — AMOXICILLIN 250 MG
2 CAPSULE ORAL 2 TIMES DAILY
Status: DISCONTINUED | OUTPATIENT
Start: 2019-10-09 | End: 2019-10-10 | Stop reason: HOSPADM

## 2019-10-09 RX ORDER — DIPHENHYDRAMINE HYDROCHLORIDE 50 MG/ML
25 INJECTION INTRAMUSCULAR; INTRAVENOUS ONCE
Status: COMPLETED | OUTPATIENT
Start: 2019-10-09 | End: 2019-10-09

## 2019-10-09 RX ORDER — OMEPRAZOLE 20 MG/1
40 CAPSULE, DELAYED RELEASE ORAL DAILY
Status: DISCONTINUED | OUTPATIENT
Start: 2019-10-09 | End: 2019-10-10 | Stop reason: HOSPADM

## 2019-10-09 RX ORDER — ACETAMINOPHEN 325 MG/1
650 TABLET ORAL EVERY 4 HOURS PRN
Status: DISCONTINUED | OUTPATIENT
Start: 2019-10-09 | End: 2019-10-10 | Stop reason: HOSPADM

## 2019-10-09 RX ORDER — ASPIRIN 81 MG/1
324 TABLET, CHEWABLE ORAL DAILY
Status: DISCONTINUED | OUTPATIENT
Start: 2019-10-09 | End: 2019-10-10 | Stop reason: HOSPADM

## 2019-10-09 RX ORDER — EZETIMIBE 10 MG/1
10 TABLET ORAL EVERY MORNING
COMMUNITY
End: 2020-10-09

## 2019-10-09 RX ORDER — POLYETHYLENE GLYCOL 3350 17 G/17G
1 POWDER, FOR SOLUTION ORAL
Status: DISCONTINUED | OUTPATIENT
Start: 2019-10-09 | End: 2019-10-10 | Stop reason: HOSPADM

## 2019-10-09 RX ORDER — HYDRALAZINE HYDROCHLORIDE 20 MG/ML
10 INJECTION INTRAMUSCULAR; INTRAVENOUS
Status: DISCONTINUED | OUTPATIENT
Start: 2019-10-09 | End: 2019-10-10 | Stop reason: HOSPADM

## 2019-10-09 RX ADMIN — ASPIRIN 325 MG: 325 TABLET, FILM COATED ORAL at 06:13

## 2019-10-09 RX ADMIN — SODIUM CHLORIDE 1000 ML: 9 INJECTION, SOLUTION INTRAVENOUS at 05:12

## 2019-10-09 RX ADMIN — ACETAMINOPHEN 650 MG: 325 TABLET, FILM COATED ORAL at 21:10

## 2019-10-09 RX ADMIN — ONDANSETRON 4 MG: 2 INJECTION INTRAMUSCULAR; INTRAVENOUS at 04:25

## 2019-10-09 RX ADMIN — ACETAMINOPHEN 650 MG: 325 TABLET, FILM COATED ORAL at 16:51

## 2019-10-09 RX ADMIN — IOHEXOL 40 ML: 350 INJECTION, SOLUTION INTRAVENOUS at 04:34

## 2019-10-09 RX ADMIN — ACETAMINOPHEN 650 MG: 325 TABLET, FILM COATED ORAL at 10:44

## 2019-10-09 RX ADMIN — EZETIMIBE 10 MG: 10 TABLET ORAL at 16:51

## 2019-10-09 RX ADMIN — IOHEXOL 80 ML: 350 INJECTION, SOLUTION INTRAVENOUS at 04:35

## 2019-10-09 RX ADMIN — METOCLOPRAMIDE 10 MG: 5 INJECTION, SOLUTION INTRAMUSCULAR; INTRAVENOUS at 05:18

## 2019-10-09 RX ADMIN — OMEPRAZOLE 40 MG: 20 CAPSULE, DELAYED RELEASE ORAL at 06:12

## 2019-10-09 RX ADMIN — DIPHENHYDRAMINE HYDROCHLORIDE 25 MG: 50 INJECTION INTRAMUSCULAR; INTRAVENOUS at 05:14

## 2019-10-09 ASSESSMENT — LIFESTYLE VARIABLES
ALCOHOL_USE: YES
EVER_SMOKED: NEVER
HOW MANY TIMES IN THE PAST YEAR HAVE YOU HAD 5 OR MORE DRINKS IN A DAY: 0
SUBSTANCE_ABUSE: 0
HAVE PEOPLE ANNOYED YOU BY CRITICIZING YOUR DRINKING: NO
TOTAL SCORE: 0
ON A TYPICAL DAY WHEN YOU DRINK ALCOHOL HOW MANY DRINKS DO YOU HAVE: 1
CONSUMPTION TOTAL: NEGATIVE
EVER HAD A DRINK FIRST THING IN THE MORNING TO STEADY YOUR NERVES TO GET RID OF A HANGOVER: NO
TOTAL SCORE: 0
HAVE YOU EVER FELT YOU SHOULD CUT DOWN ON YOUR DRINKING: NO
TOTAL SCORE: 0
AVERAGE NUMBER OF DAYS PER WEEK YOU HAVE A DRINK CONTAINING ALCOHOL: 7
EVER FELT BAD OR GUILTY ABOUT YOUR DRINKING: NO

## 2019-10-09 ASSESSMENT — COPD QUESTIONNAIRES
DO YOU EVER COUGH UP ANY MUCUS OR PHLEGM?: YES, EVERY DAY
IN THE PAST 12 MONTHS DO YOU DO LESS THAN YOU USED TO BECAUSE OF YOUR BREATHING PROBLEMS: DISAGREE/UNSURE
HAVE YOU SMOKED AT LEAST 100 CIGARETTES IN YOUR ENTIRE LIFE: NO/DON'T KNOW
COPD SCREENING SCORE: 4
DURING THE PAST 4 WEEKS HOW MUCH DID YOU FEEL SHORT OF BREATH: NONE/LITTLE OF THE TIME

## 2019-10-09 ASSESSMENT — COGNITIVE AND FUNCTIONAL STATUS - GENERAL
DAILY ACTIVITIY SCORE: 22
MOBILITY SCORE: 22
SUGGESTED CMS G CODE MODIFIER DAILY ACTIVITY: CJ
TOILETING: A LITTLE
SUGGESTED CMS G CODE MODIFIER MOBILITY: CJ
WALKING IN HOSPITAL ROOM: A LITTLE
STANDING UP FROM CHAIR USING ARMS: A LITTLE
HELP NEEDED FOR BATHING: A LITTLE

## 2019-10-09 ASSESSMENT — ENCOUNTER SYMPTOMS
CHILLS: 0
DEPRESSION: 0
FEVER: 0
VOMITING: 0
SENSORY CHANGE: 0
FLANK PAIN: 0
DOUBLE VISION: 0
FOCAL WEAKNESS: 0
ABDOMINAL PAIN: 0
BRUISES/BLEEDS EASILY: 0
COUGH: 0
WEAKNESS: 0
NAUSEA: 0
HEARTBURN: 0
DIZZINESS: 0
MYALGIAS: 0
SHORTNESS OF BREATH: 0
HEMOPTYSIS: 0
PALPITATIONS: 0
EYE DISCHARGE: 0
HALLUCINATIONS: 0
SPEECH CHANGE: 0
BLURRED VISION: 0
LOSS OF CONSCIOUSNESS: 1

## 2019-10-09 ASSESSMENT — PATIENT HEALTH QUESTIONNAIRE - PHQ9
1. LITTLE INTEREST OR PLEASURE IN DOING THINGS: NOT AT ALL
SUM OF ALL RESPONSES TO PHQ9 QUESTIONS 1 AND 2: 0
2. FEELING DOWN, DEPRESSED, IRRITABLE, OR HOPELESS: NOT AT ALL

## 2019-10-09 NOTE — ED NOTES
Medication reconciliation updated and complete per pt at bedside  Allergies have been verified and updated   No oral ABX within the last 14 days  Pt home pharmacy:[facility]

## 2019-10-09 NOTE — ED NOTES
Introduced self to pt, discussed POC, updated on wait status, answered questions, addressed needs, ensured call light within reach. Provided emotional support for pt. Communication board updated

## 2019-10-09 NOTE — ED TRIAGE NOTES
Chief Complaint   Patient presents with   • Possible Stroke     Sudden onset memory loss. No other defecits.     Pt brought in by EMS for above complaint. Pt's daughter states she talked to pt last night at 1930 and she was normal. Pt called her daughter at 0330 this AM to tell her that she didn't feel good and then hung up to vomit. Pt called daughter 3 more times and repeated the same conversation with no memory of previous calls. Pt's daughter denies any knowledge of pt injury or hitting her head. Pt is not on blood thinners and FSBS en route was 88. Pt was vomiting upon arrival to ER and EMS gave 4mg Zofran IVP en route.

## 2019-10-09 NOTE — CONSULTS
"Telestroke Consultation     Please note that I could not evaluate the patient in person at the site. All examination and evaluation of the patient and information gathering from the patient and/or the family was performed jointly by myself (via licensed and encrypted tele-video communication equipment) and the requesting physician, Dr. Nata Boyd M.D..  As such, my assessments and recommendations are limited as far as such limited evaluation allows.    Hospital: [facility] ED  Consulting Physician: Willis Machuca M.D. - Users credentials  Requesting Physician: Nata Boyd M.D. - Attending Provider  Patient name:      Danii Rodriguez  :         1960  MRN:         [id number]  Video Time:        0424     Date of Service: 10/9/2019    Chief Complaint: ED Acute stroke code Chief Complaint   Patient presents with   • Possible Stroke     Sudden onset memory loss. No other defecits.       History of Present Illness:  Danii Rodriguez is a 58 year old woman with a hx of HTN and HLD for whom a code stroke was called for altered mental status. The patient's daughter called the patient on the phone earlier this AM and noted her to be confused and disoriented.  Per EMS physician, patient did not know the president or the current date.  Per EMS physician, pt. Arrived \"nonfocal\" without weakness, numbness, changes in vision, or speech.  Mental status noted for confusion and disorientation.    Time of symptom onset: 330  TLKW: 1930 10/8/19  Associated symptoms: nausea and vomitting  Alleviating/exacerbating factors: none   Blood glucose: per EMS.    Review of systems: In addition to what is detailed in the HPI above, (and scanned into the chart if and when application), all other systems reviewed and are negative.    Allergies:  Allergies   Allergen Reactions   • Ciprofloxacin Hives     Okay to take cipro ear drops, no reaction with ear drop   • Latex Rash     Tape burns and rash- paper " tape OK   • Other Environmental      seasonal   • Sulfa Drugs Itching     Not had but was told not to use   • Vicodin [Hydrocodone-Acetaminophen] Rash     Rash      Hospital Medications:    Current Facility-Administered Medications:   •  ONDANSETRON HCL 4 MG/2ML INJ SOLN, , , ,     Current Outpatient Medications:   •  Plant Sterols and Stanols (CHOLESTOFF PO), Take 1 Tab by mouth 2 Times a Day., Disp: , Rfl:   •  ezetimibe (ZETIA) 10 MG Tab, Take 1 Tab by mouth every day., Disp: 90 Tab, Rfl: 3  •  lisinopril (PRINIVIL) 20 MG Tab, TAKE 1 TABLET BY MOUTH EVERY DAY, Disp: 100 Tab, Rfl: 3  •  fluticasone (FLONASE) 50 MCG/ACT nasal spray, SPRAY 2 SPRAYS IN NOSE EVERY DAY., Disp: 16 Bottle, Rfl: 3  •  Diclofenac Sodium 1 % Gel, Apply 2 gram on upper extremities twice a day as needed., Disp: 1 Tube, Rfl: 1  •  ondansetron (ZOFRAN) 4 MG Tab tablet, Take 1 Tab by mouth every four hours as needed for Nausea/Vomiting., Disp: 30 Tab, Rfl: 1  •  meclizine (ANTIVERT) 25 MG Tab, Take 1 Tab by mouth 3 times a day as needed., Disp: 30 Tab, Rfl: 0  •  scopolamine (TRANSDERM-SCOP) 1 mg/72hr PATCH 72 HR, Apply 1 Patch to skin as directed every 72 hours., Disp: 4 Patch, Rfl: 3  •  coenzyme Q-10 30 MG capsule, Take 60 mg by mouth every day., Disp: , Rfl:   •  Multiple Vitamins-Minerals (WOMENS 50+ MULTI VITAMIN/MIN PO), Take  by mouth., Disp: , Rfl:   •  albuterol 108 (90 Base) MCG/ACT Aero Soln inhalation aerosol, Inhale 2 Puffs by mouth every 6 hours as needed for Shortness of Breath., Disp: , Rfl:   •  TURMERIC PO, Take 1 Tab by mouth every day., Disp: , Rfl:   •  omeprazole (PRILOSEC) 40 MG delayed-release capsule, Take 40 mg by mouth every day., Disp: , Rfl: 5  •  Probiotic Product (PROBIOTIC COLON SUPPORT PO), Take 1 Cap by mouth every day., Disp: , Rfl:   •  Cholecalciferol (VITAMIN D3) 2000 UNIT Cap, Take 4,000 Units by mouth every day., Disp: , Rfl:     Antithrombotic: n/a    Past Medical History:  Past Medical History:    Diagnosis Date   • Abdominal pain    • Allergic rhinitis    • Anesthesia     ponv   • Back pain    • Blood in urine    • Chest tightness    • Constipation    • Cough    • Daytime sleepiness    • Diarrhea    • Earache    • Eye drainage    • Fatigue    • Frequent headaches    • GERD (gastroesophageal reflux disease)    • Heartburn    • High cholesterol    • Hoarseness, persistent    • Hypertension    • Indigestion    • Irregular periods    • Morning headache    • MRSA (methicillin resistant staph aureus) culture positive     from spider bite- no open wounds as of    • Nasal drainage    • Painful joint    • Painful urination    • Palpitations    • Restless leg syndrome    • Rhinitis    • Ringing in ears    • Sore muscles    • Swelling of lower extremity    • Unspecified disorder of thyroid    • Unspecified urinary incontinence    • Wears glasses        Social History:  Social History     Socioeconomic History   • Marital status:      Spouse name: Not on file   • Number of children: Not on file   • Years of education: Not on file   • Highest education level: Not on file   Occupational History   • Not on file   Social Needs   • Financial resource strain: Not on file   • Food insecurity:     Worry: Not on file     Inability: Not on file   • Transportation needs:     Medical: Not on file     Non-medical: Not on file   Tobacco Use   • Smoking status: Former Smoker     Last attempt to quit: 1976     Years since quittin.7   • Smokeless tobacco: Never Used   Substance and Sexual Activity   • Alcohol use: Yes     Alcohol/week: 3.6 oz     Types: 6 Glasses of wine per week     Frequency: 4 or more times a week     Drinks per session: 1 or 2     Binge frequency: Never   • Drug use: No   • Sexual activity: Not Currently   Lifestyle   • Physical activity:     Days per week: Not on file     Minutes per session: Not on file   • Stress: Not on file   Relationships   • Social connections:     Talks on phone:  "Not on file     Gets together: Not on file     Attends Congregation service: Not on file     Active member of club or organization: Not on file     Attends meetings of clubs or organizations: Not on file     Relationship status: Not on file   • Intimate partner violence:     Fear of current or ex partner: Not on file     Emotionally abused: Not on file     Physically abused: Not on file     Forced sexual activity: Not on file   Other Topics Concern   • Not on file   Social History Narrative   • Not on file       Family History (If relevant):  Family History   Problem Relation Age of Onset   • Cancer Paternal Grandmother    • Other Paternal Grandmother         lupus   • Heart Disease Paternal Grandmother          70s   • Cancer Paternal Aunt    • Other Mother         Parkinson disease   • Bipolar disorder Mother    • Hypertension Mother    • Thyroid Mother         Hypothyroid   • Heart Disease Father         Congenital heart disease, CHF   • Hypertension Father    • Diabetes Father    • Stroke Sister         age 53, smoker   • Bipolar disorder Sister    • Other Sister         alcoholic        Vitals:  Vitals:    10/09/19 0425   Weight: 62.1 kg (136 lb 14.5 oz)   Height: 1.6 m (5' 3\")     Decision NOT to give alteplase: TLKW>4.5H and unlikely to be stroke    Physical examination:     Vitals:    10/09/19 0730 10/09/19 0800 10/09/19 0842 10/09/19 0855   BP: 131/64 123/55 128/62    Pulse: 66 75 71    Resp:   16    Temp:   36.9 °C (98.5 °F)    TempSrc:   Temporal    SpO2: 96% 96%     Weight:    (P) 63.2 kg (139 lb 5.3 oz)   Height:           General: Patient is awake and in no acute distress  Neck: There is normal range of motion  CV: RRR    NEUROLOGICAL EXAM:     Mental status: Awake, alert and disoriented, follows simple commands  Speech and language: speech is clear and fluent. The patient is able to name and repeat.  Cranial nerve exam: intact  Motor exam: antigravity x4 ext. No abnormal movements were seen on " exam.  Sensory exam: No sensory deficits identified   Deep tendon reflexes:  Toes down-going bilaterally.  Coordination: no ataxia   Gait: deferred    Laboratory Data:  Lab Results   Component Value Date/Time    PLATELETCT 278 09/05/2019 1049     Lab Results   Component Value Date/Time    PROTHROMBTM 12.1 06/05/2018 0727    INR 0.90 06/05/2018 0727    APTT 26.8 06/05/2018 0727     Lab Results   Component Value Date/Time    GLUCOSE 87 09/05/2019 1046     Lab Results   Component Value Date/Time    CREATININE 0.64 09/05/2019 1046     Lab Results   Component Value Date/Time    IFAFRICA >60 09/05/2019 1046    IFNOTAFR >60 09/05/2019 1046       Assessment:     58 year old woman with HTN and HLD presenting with confusion and disorientation.  Pt. Is presenting outside the window for thrombolytic therapy.  Differential for underlying etiology includes TGA, toxic metabolic derangement, vs. Seizure.  Lower on the ddx. Is a SAH vs. ICH.  Patient pending STAT neuroimaging.    Plan:  - STAT CT head and CTA head and neck  - admit and workup change in mental statuts  - toxic metabolic workup (UA, xray)  - serum workup: TSH, B12, thiamine, RPR, HIV, ammonia, LFTs  - if the above workup is unrevealing, MRI brain and rEEG  - can consider LP if patient does not return to baseline    Discussed with Dr. Nata Boyd.    Willis Machuca MD  Director, Comprehensive Stroke Center, [facility]  Neurohospitalist, [facility]  Clinical  of Neurology, [employer]  (t) [phone] (f) [phone]

## 2019-10-09 NOTE — DISCHARGE PLANNING
Medical Social Work    Referral: Stroke    Intervention: Pt is a 58 year old female brought in by [facility] from home for stroke-like symptoms.  Pt is Danii Michael (: 1960).  Pt's daughter, Gypsy ([phone]) arrived and was escorted to charge desk to speak to ERP as pt is not aware of what's happening.  Pt's daughter was provided with emotional support and escorted to RD01 while pt went to CT.    Plan: SW will remain available.

## 2019-10-09 NOTE — ASSESSMENT & PLAN NOTE
Code stroke called upon arrival to ER   Ct head, CTp/cta head and neck unremarkable   Will need Mri brain, eeg for further evaluation   Tsh,b12,ammonia,thiamine, UA   Permissive htn, asa, statin therapy   Consider Neuro vs psych consultation in the am

## 2019-10-09 NOTE — ED NOTES
Patient roomed, bedside report from Edelmira HOLT.    family at bedside, patient on monitor, in NAD, updated on POC

## 2019-10-09 NOTE — PROGRESS NOTES
Pt arrived to unit via [place] at 0830. Pt oriented to room, unit, and plan of care. Tele-monitor placed and monitor room notified. All questions answered at this time. Call light within reach; fall precautions in place.

## 2019-10-09 NOTE — DISCHARGE PLANNING
[facility] Rehabilitation Transitional Care Coordination     Referral from:  Dr. Morgan   Facesheet indicates: [facility]   Potential Rehab Diagnosis: Stochayo protocol          Physiatry consultation pended per protocol.        Ongoing work up no therapy evaluation or 6 clicks score.       Thank you for referral.

## 2019-10-09 NOTE — H&P
Hospital Medicine History & Physical Note    Date of Service  10/9/2019    Primary Care Physician  Juany Ralph M.D.    Consultants  none    Code Status  full    Chief Complaint  Mental status change     History of Presenting Illness  58 y.o. female who presented 10/9/2019 with past medical history of acid reflux, frequent headaches, hyperlipidemia, hypertension who presents with transient confusion.  This patient had a episode of sudden memory loss at 3:30 AM.  She called her daughter multiple times and was confused.  She was last seen normal at 7:30 PM yesterday.  EMS was called.  Patient had no focal deficits at that time.  Patient was disoriented and had evidence of memory loss.  However no slurred speech facial droop or numbness tingling or weakness.  Otherwise she has no known alleviating or exacerbating factors to her symptoms.  Her symptoms have improved.  She will be admitted to the hospital for further stroke work-up.    Review of Systems  Review of Systems   Constitutional: Positive for malaise/fatigue. Negative for chills and fever.   HENT: Negative for congestion, hearing loss and tinnitus.    Eyes: Negative for blurred vision, double vision and discharge.   Respiratory: Negative for cough, hemoptysis and shortness of breath.    Cardiovascular: Negative for chest pain, palpitations and leg swelling.   Gastrointestinal: Negative for abdominal pain, heartburn, nausea and vomiting.   Genitourinary: Negative for dysuria and flank pain.   Musculoskeletal: Negative for joint pain and myalgias.   Skin: Negative for rash.   Neurological: Positive for loss of consciousness. Negative for dizziness, sensory change, speech change, focal weakness and weakness.   Endo/Heme/Allergies: Negative for environmental allergies. Does not bruise/bleed easily.   Psychiatric/Behavioral: Negative for depression, hallucinations and substance abuse.       Past Medical History   has a past medical history of Abdominal pain,  Allergic rhinitis, Anesthesia, Back pain, Blood in urine, Chest tightness, Constipation, Cough, Daytime sleepiness, Diarrhea, Earache, Eye drainage, Fatigue, Frequent headaches, GERD (gastroesophageal reflux disease), Heartburn, High cholesterol, Hoarseness, persistent, Hypertension, Indigestion, Irregular periods, Morning headache, MRSA (methicillin resistant staph aureus) culture positive (2010), Nasal drainage, Painful joint, Painful urination, Palpitations, Restless leg syndrome, Rhinitis, Ringing in ears, Sore muscles, Swelling of lower extremity, Unspecified disorder of thyroid, Unspecified urinary incontinence, and Wears glasses.    Surgical History   has a past surgical history that includes carpal tunnel endoscopic (9/30/2014); abdominal hysterectomy total; nasal septal reconstruction; cystoscopy; bone graft; incision and drainage ent; trigger finger release (Right, 11/20/2018); joint injection diagnostic (Bilateral, 11/20/2018); carpal tunnel release; and hysterectomy laparoscopy.     Family History  family history includes Bipolar disorder in her mother and sister; Cancer in her paternal aunt and paternal grandmother; Diabetes in her father; Heart Disease in her father and paternal grandmother; Hypertension in her father and mother; Other in her mother, paternal grandmother, and sister; Stroke in her sister; Thyroid in her mother.     Social History   reports that she quit smoking about 43 years ago. She has never used smokeless tobacco. She reports that she drinks about 3.6 oz of alcohol per week. She reports that she does not use drugs.    Allergies  Allergies   Allergen Reactions   • Ciprofloxacin Hives     Okay to take cipro ear drops, no reaction with ear drop   • Latex Rash     Tape burns and rash- paper tape OK   • Other Environmental      seasonal   • Sulfa Drugs Itching     Not had but was told not to use   • Vicodin [Hydrocodone-Acetaminophen] Rash     Rash        Medications  Prior to Admission  Medications   Prescriptions Last Dose Informant Patient Reported? Taking?   Cholecalciferol (VITAMIN D3) 2000 UNIT Cap  Patient Yes No   Sig: Take 4,000 Units by mouth every day.   Diclofenac Sodium 1 % Gel   No No   Sig: Apply 2 gram on upper extremities twice a day as needed.   Multiple Vitamins-Minerals (WOMENS 50+ MULTI VITAMIN/MIN PO)   Yes No   Sig: Take  by mouth.   Plant Sterols and Stanols (CHOLESTOFF PO)   Yes No   Sig: Take 1 Tab by mouth 2 Times a Day.   Probiotic Product (PROBIOTIC COLON SUPPORT PO)  Patient Yes No   Sig: Take 1 Cap by mouth every day.   TURMERIC PO  Patient Yes No   Sig: Take 1 Tab by mouth every day.   albuterol 108 (90 Base) MCG/ACT Aero Soln inhalation aerosol   Yes No   Sig: Inhale 2 Puffs by mouth every 6 hours as needed for Shortness of Breath.   coenzyme Q-10 30 MG capsule   Yes No   Sig: Take 60 mg by mouth every day.   ezetimibe (ZETIA) 10 MG Tab   No No   Sig: Take 1 Tab by mouth every day.   fluticasone (FLONASE) 50 MCG/ACT nasal spray   No No   Sig: SPRAY 2 SPRAYS IN NOSE EVERY DAY.   lisinopril (PRINIVIL) 20 MG Tab   No No   Sig: TAKE 1 TABLET BY MOUTH EVERY DAY   meclizine (ANTIVERT) 25 MG Tab   No No   Sig: Take 1 Tab by mouth 3 times a day as needed.   omeprazole (PRILOSEC) 40 MG delayed-release capsule  Patient Yes No   Sig: Take 40 mg by mouth every day.   ondansetron (ZOFRAN) 4 MG Tab tablet   No No   Sig: Take 1 Tab by mouth every four hours as needed for Nausea/Vomiting.   scopolamine (TRANSDERM-SCOP) 1 mg/72hr PATCH 72 HR   No No   Sig: Apply 1 Patch to skin as directed every 72 hours.      Facility-Administered Medications: None       Physical Exam       Physical Exam   Constitutional: She appears well-developed and well-nourished. No distress.   HENT:   Head: Normocephalic and atraumatic.   Eyes: Pupils are equal, round, and reactive to light. Conjunctivae and EOM are normal.   Neck: Normal range of motion. Neck supple. No JVD present.   Cardiovascular: Normal  rate, regular rhythm, normal heart sounds and intact distal pulses.   No murmur heard.  Pulmonary/Chest: Effort normal and breath sounds normal. No respiratory distress. She has no wheezes.   Abdominal: Soft. Bowel sounds are normal. She exhibits no distension. There is no tenderness.   Musculoskeletal: Normal range of motion. She exhibits no edema.   Neurological: She is alert. She exhibits normal muscle tone.   Slowed speech and confused   Skin: Skin is warm and dry.   Psychiatric: She has a normal mood and affect. Her behavior is normal. Judgment and thought content normal.   Nursing note and vitals reviewed.      Laboratory:  Recent Labs     10/09/19  0415   WBC 8.0   RBC 4.21   HEMOGLOBIN 13.3   HEMATOCRIT 38.5   MCV 91.4   MCH 31.6   MCHC 34.5   RDW 43.4   PLATELETCT 229   MPV 10.3     Recent Labs     10/09/19  0415   SODIUM 142   POTASSIUM 3.9   CHLORIDE 109   CO2 24   GLUCOSE 94   BUN 14   CREATININE 0.70   CALCIUM 8.6     Recent Labs     10/09/19  0415   ALTSGPT 24   ASTSGOT 28   ALKPHOSPHAT 54   TBILIRUBIN 0.4   GLUCOSE 94     Recent Labs     10/09/19  0415   APTT 25.0   INR 0.86*     No results for input(s): NTPROBNP in the last 72 hours.      Recent Labs     10/09/19  0415   TROPONINT <6       Urinalysis:    No results found     Imaging:  DX-CHEST-PORTABLE (1 VIEW)   Final Result         1.  No acute cardiopulmonary disease.      CT-CTA HEAD WITH & W/O-POST PROCESS   Final Result         1.  CT angiogram of the [place] within normal limits.      CT-CTA NECK WITH & W/O-POST PROCESSING   Final Result         1.  CT angiogram of the neck within normal limits.   2.  Soft tissue prominence in the right superior hilum, could represent enlarged lymph node. Mild mediastinal adenopathy is seen. Recommend nonemergent follow-up contrast-enhanced CT chest for further evaluation.      CT-CEREBRAL PERFUSION ANALYSIS   Final Result         1.  Cerebral blood flow less than 30% likely representing completed  infarct = 0 mL.      2.  T Max more than 6 seconds likely representing combination of completed infarct and ischemia = 0 mL.      3.  Mismatched volume likely representing ischemic brain/penumbra = None      4.  Please note that the cerebral perfusion was performed on the limited brain tissue around the basal ganglia region. Infarct/ischemia outside the CT perfusion sections can be missed in this study.      CT-HEAD W/O   Final Result         1.  No acute intracranial abnormality.      MR-BRAIN-W/O    (Results Pending)         Assessment/Plan:  I anticipate this patient is appropriate for observation status at this time.    * Transient global amnesia  Assessment & Plan  Code stroke called upon arrival to ER   Ct head, CTp/cta head and neck unremarkable   Will need Mri brain, eeg for further evaluation   Tsh,b12,ammonia,thiamine, UA   Permissive htn, asa, statin therapy   Consider Neuro vs psych consultation in the am    Mediastinal adenopathy  Assessment & Plan  eventually needs follow up contrast CT chest    Essential hypertension- (present on admission)  Assessment & Plan  Allowing for permissive htn for now    Pure hypercholesterolemia- (present on admission)  Assessment & Plan  Resume home zetia, and statin therapy     History of hypothyroidism- (present on admission)  Assessment & Plan  Hx of    Gastroesophageal reflux disease without esophagitis- (present on admission)  Assessment & Plan  Resume home PPI       VTE prophylaxis: scd

## 2019-10-09 NOTE — ED NOTES
January 23, 2017         Anthony Hirsch MD  [address]  [address] [facility] [id number]      Patient: Gautam Null   YOB: 1943   Date of Visit: 1/23/2017       Dear Dr. Adore Christopher MD,    I saw your patient, Gautam Null, on 1/ Patient ambulated steady gait, stand by assist to bathroom, UA sent to lab. Patient in NAD, on monitor, denies any needs at this time.

## 2019-10-09 NOTE — ED PROVIDER NOTES
ED Provider Note    Scribed for Nata Boyd M.D. by Mirian Briggs. 10/9/2019  4:17 AM    Means of arrival: Ambulance  History obtained from: EMS  History limited by: None      CHIEF COMPLAINT  Chief Complaint   Patient presents with   • Possible Stroke     Sudden onset memory loss. No other defecits.       HPI  Danii Rodriguez is a 58 y.o. otherwise healthy female who presents to the Emergency Department as a stroke alert due to sudden loss of memory onset 3:30 AM. EMS states the patient called her daughter four times and was confused. She does not remember any of the phone call conversations. En route the ED, the patent had no neurological deficits and had stable vital signs. The patient denies any history of similar symptoms in the past. She does not have any history or family history of demnetia, memory loss, or stroke. Currently, the patient is disoriented to year and event. Negative for any slurred speech, facial droop, or shortness of breath.    Additional history from the patient's daughter reports the patient was last seen normal at 7:30 PM yesterday. During dinner, the patient was complaining of abdominal cramping and nausea after eating pizza. She later took Imodium and Prilosec for GI ulcer and the daughter did not observe any abnormal behavior when she left her home. The patient's daughter states she called her four times, the first phone call at 3:20 AM. She states the patient had to hang up the phone due to nausea and vomiting. The patient would then call the daughter again, however would not remember the conversation. Her daughter reports history of of hyperlipidemia and recent high stress, but states she is not taking any anticoagulants.       REVIEW OF SYSTEMS  Pertinent positive include memory loss and confusion. Pertinent negative include slurred speech, facial droop, or shortness of breath.. All other systems reviewed and are negative.      PAST MEDICAL HISTORY   has a past medical  history of Abdominal pain, Allergic rhinitis, Anesthesia, Back pain, Blood in urine, Chest tightness, Constipation, Cough, Daytime sleepiness, Diarrhea, Earache, Eye drainage, Fatigue, Frequent headaches, GERD (gastroesophageal reflux disease), Heartburn, High cholesterol, Hoarseness, persistent, Hypertension, Indigestion, Irregular periods, Morning headache, MRSA (methicillin resistant staph aureus) culture positive (), Nasal drainage, Painful joint, Painful urination, Palpitations, Restless leg syndrome, Rhinitis, Ringing in ears, Sore muscles, Swelling of lower extremity, Unspecified disorder of thyroid, Unspecified urinary incontinence, and Wears glasses.    SOCIAL HISTORY  Social History     Tobacco Use   • Smoking status: Former Smoker     Last attempt to quit: 1976     Years since quittin.7   • Smokeless tobacco: Never Used   Substance and Sexual Activity   • Alcohol use: Yes     Alcohol/week: 3.6 oz     Types: 6 Glasses of wine per week     Frequency: 4 or more times a week     Drinks per session: 1 or 2     Binge frequency: Never   • Drug use: No   • Sexual activity: Not Currently       SURGICAL HISTORY   has a past surgical history that includes carpal tunnel endoscopic (2014); abdominal hysterectomy total; nasal septal reconstruction; cystoscopy; bone graft; incision and drainage ent; trigger finger release (Right, 2018); joint injection diagnostic (Bilateral, 2018); carpal tunnel release; and hysterectomy laparoscopy.    CURRENT MEDICATIONS    Current Outpatient Medications on File Prior to Encounter   Medication Sig Dispense Refill   • Plant Sterols and Stanols (CHOLESTOFF PO) Take 1 Tab by mouth 2 Times a Day.     • ezetimibe (ZETIA) 10 MG Tab Take 1 Tab by mouth every day. 90 Tab 3   • lisinopril (PRINIVIL) 20 MG Tab TAKE 1 TABLET BY MOUTH EVERY  Tab 3   • fluticasone (FLONASE) 50 MCG/ACT nasal spray SPRAY 2 SPRAYS IN NOSE EVERY DAY. 16 Bottle 3   • Diclofenac  "Sodium 1 % Gel Apply 2 gram on upper extremities twice a day as needed. 1 Tube 1   • ondansetron (ZOFRAN) 4 MG Tab tablet Take 1 Tab by mouth every four hours as needed for Nausea/Vomiting. 30 Tab 1   • meclizine (ANTIVERT) 25 MG Tab Take 1 Tab by mouth 3 times a day as needed. 30 Tab 0   • scopolamine (TRANSDERM-SCOP) 1 mg/72hr PATCH 72 HR Apply 1 Patch to skin as directed every 72 hours. 4 Patch 3   • coenzyme Q-10 30 MG capsule Take 60 mg by mouth every day.     • Multiple Vitamins-Minerals (WOMENS 50+ MULTI VITAMIN/MIN PO) Take  by mouth.     • albuterol 108 (90 Base) MCG/ACT Aero Soln inhalation aerosol Inhale 2 Puffs by mouth every 6 hours as needed for Shortness of Breath.     • TURMERIC PO Take 1 Tab by mouth every day.     • omeprazole (PRILOSEC) 40 MG delayed-release capsule Take 40 mg by mouth every day.  5   • Probiotic Product (PROBIOTIC COLON SUPPORT PO) Take 1 Cap by mouth every day.     • Cholecalciferol (VITAMIN D3) 2000 UNIT Cap Take 4,000 Units by mouth every day.        ALLERGIES  Allergies   Allergen Reactions   • Ciprofloxacin Hives     Okay to take cipro ear drops, no reaction with ear drop   • Latex Rash     Tape burns and rash- paper tape OK   • Other Environmental      seasonal   • Sulfa Drugs Itching     Not had but was told not to use   • Vicodin [Hydrocodone-Acetaminophen] Rash     Rash        PHYSICAL EXAM   VITAL SIGNS: Ht 1.6 m (5' 3\")   Wt 62.1 kg (136 lb 14.5 oz)   BMI 24.25 kg/m²    Constitutional: Uncomfortable appearing middle-aged female.  Alert in mild distress.  HENT: Normocephalic, Atraumatic. Bilateral external ears normal. Nose normal.  Moist mucous membranes.  Oropharynx clear.  Eyes: Pupils are equal and reactive. Conjunctiva normal.   Neck: Supple, full range of motion  Heart: Regular rate and rhythm.  No murmurs.    Lungs: No respiratory distress, normal work of breathing. Lungs clear to auscultation bilaterally.  Abdomen Soft, no distention.  No tenderness to " palpation.  Musculoskeletal: Atraumatic. No obvious deformities noted.  No lower extremity edema.  Skin: Warm, Dry.  No erythema, No rash.   Neurologic: Alert and oriented x1-2. Moving all extremities spontaneously without focal deficits. Cranial nerves II-XII intact.  Strength 5/5 and sensation inact throughout all 4 extremities.  No pronator drift.  No dysmetria.  Normal speech. Normal gait.   Psychiatric: Confused however affect normal, Mood normal, Appears appropriate and not intoxicated.      DIAGNOSTIC STUDIES    EKG  EKG interpreted by me, as shown below.    Results for orders placed or performed during the hospital encounter of 18   EKG (ER)   Result Value Ref Range    Report       [facility] Emergency Dept.    Test Date:  2018  Pt Name:    CLAY VALENTINE               Department: [facility]  MRN:        [id number]                      [facility]  Gender:     Female                       Technician: [id number]  :        1960                   Requested By:DERRICK MCKOY  Order #:    [id number]                    Reading MD:    Measurements  Intervals                                Axis  Rate:       65                           P:          84  MN:         149                          QRS:        50  QRSD:       73                           T:          10  QT:         405  QTc:        422    Interpretive Statements  Sinus rhythm  LAE, consider biatrial enlargement  Probable left ventricular hypertrophy  Compared to ECG 2014 04:50:31  No significant changes             LABS  Personally reviewed by me  Labs Reviewed   PROTHROMBIN TIME - Abnormal; Notable for the following components:       Result Value    PT 11.9 (*)     INR 0.86 (*)     All other components within normal limits    Narrative:     Indicate which anticoagulants the patient is on:->UNKNOWN   CBC WITH DIFFERENTIAL    Narrative:     Indicate which anticoagulants the patient is on:->UNKNOWN   COMP  METABOLIC PANEL    Narrative:     Indicate which anticoagulants the patient is on:->UNKNOWN   APTT    Narrative:     Indicate which anticoagulants the patient is on:->UNKNOWN   TROPONIN    Narrative:     Indicate which anticoagulants the patient is on:->UNKNOWN   ABO RH CONFIRM   ESTIMATED GFR    Narrative:     Indicate which anticoagulants the patient is on:->UNKNOWN   COD (ADULT)   COMPONENT CELLULAR   URINALYSIS,CULTURE IF INDICATED          RADIOLOGY  Personally reviewed by me  DX-CHEST-PORTABLE (1 VIEW)   Final Result         1.  No acute cardiopulmonary disease.      CT-CTA HEAD WITH & W/O-POST PROCESS   Final Result         1.  CT angiogram of the [place] within normal limits.      CT-CTA NECK WITH & W/O-POST PROCESSING   Final Result         1.  CT angiogram of the neck within normal limits.   2.  Soft tissue prominence in the right superior hilum, could represent enlarged lymph node. Mild mediastinal adenopathy is seen. Recommend nonemergent follow-up contrast-enhanced CT chest for further evaluation.      CT-CEREBRAL PERFUSION ANALYSIS   Final Result         1.  Cerebral blood flow less than 30% likely representing completed infarct = 0 mL.      2.  T Max more than 6 seconds likely representing combination of completed infarct and ischemia = 0 mL.      3.  Mismatched volume likely representing ischemic brain/penumbra = None      4.  Please note that the cerebral perfusion was performed on the limited brain tissue around the basal ganglia region. Infarct/ischemia outside the CT perfusion sections can be missed in this study.      CT-HEAD W/O   Final Result         1.  No acute intracranial abnormality.           ED COURSE  Vitals:    10/09/19 0530 10/09/19 0600 10/09/19 0630 10/09/19 0700   BP: 125/78 148/56 123/59 134/60   Pulse: 85 72 69 66   SpO2: 97% 95% 96% 98%   Weight:       Height:             Medications administered:  Medications   NS infusion 1,000 mL (has no administration in time  range)   metoclopramide (REGLAN) injection 10 mg (has no administration in time range)   diphenhydrAMINE (BENADRYL) injection 25 mg (has no administration in time range)   ondansetron (ZOFRAN) syringe/vial injection 4 mg (4 mg Intravenous Given 10/9/19 9799)   iohexol (OMNIPAQUE) 350 mg/mL (40 mL Intravenous Given 10/9/19 7128)   iohexol (OMNIPAQUE) 350 mg/mL (80 mL Intravenous Given 10/9/19 3716)     Patient was given IV fluids for headache and vomiting.  IV hydration was used because oral hydration was not adequate alone.  Following fluid administration patient's symptoms and hydration status were improved.      4:17 AM Patient seen and examined at charged desk. The patient presents with acute memory loss onset 3:20 AM. Patient's daughter is with her.  Discussed plan of care with patient and family.  Ordered for DX Chest, CT Head, Ct cerebral perfusion analysis, Ct Head with contrast, CT CTA Neck, CBC with diff, CMP, PTT, APTT, COD, troponin, UA, EG to evaluate. Patient will be treated with Zofran 4 mg for her symptoms.     4:21 AM I discussed the patient's case and the above findings with Dr. Machuca (Neurology).  Patient is outside of the window for treatment with IV alteplase.  Her symptoms of only confusion are less likely consistent with a stroke however he does recommend MRI and will consult the patient in the morning.    MEDICAL DECISION MAKING  Otherwise healthy patient presents with acute onset of memory loss this morning.  She was last known normal by her daughter last night around 730.  She is alert on arrival however confused on arrival with reassuring vital signs.  Other than being confused, she has no other focal neurologic deficits on exam.  Stroke alert however was continued and patient was transported immediately to imaging.  She has no evidence of intracranial hemorrhage, mass, or large infarct.  Vascular imaging was performed without evidence of dissection or thrombosis.  EKG does not demonstrate  signs of ischemia or arrhythmia.  Labs are overall reassuring without evidence of acidosis or electrolyte abnormalities.  She has no fevers or infectious symptoms.    5:02 AM Patient was reevaluated at bedside. Discussed lab and radiology results with the patient and informed them that results were reassuring. The patient's daughter states that she is starting to remember event. She currently complains of headache and nausea, but denies any abdominal pain, fever, head trauma, or recent ground level falls. I informed the patient and family member that she will be admitted for MRI imaging and further observation.  I suspect that symptoms may be consistent with transient global amnesia as the patient has been under increasing stress recently.  Patient and family were understanding and agreeable with admit.    5:11 AM I discussed the patient's case and the above findings with Dr. Morgan ([facility]) who agrees to admit the patient. Patient's care was transferred at this time.      DISPOSITION:  Patient will be admitted to Dr. Morgan in guarded condition.      IMPRESSION  1. Acute encephalopathy    2. Non-intractable vomiting with nausea, unspecified vomiting type        Results, diagnoses, and treatment options were discussed with the patient and/or family. Patient verbalized understanding of plan of care.    New Prescriptions    No medications on file            Mirian LUIS (Scribe), am scribing for, and in the presence of, Nata Boyd M.D..    Electronically signed by: Mirian Briggs (Scribe), 10/9/2019    Nata LUIS M.D. personally performed the services described in this documentation, as scribed by Mirian Briggs in my presence, and it is both accurate and complete. C    The note accurately reflects work and decisions made by me.  Nata Boyd  10/9/2019  7:29 AM

## 2019-10-10 ENCOUNTER — PATIENT OUTREACH (OUTPATIENT)
Dept: HEALTH INFORMATION MANAGEMENT | Facility: OTHER | Age: 59
End: 2019-10-10

## 2019-10-10 VITALS
WEIGHT: 139.33 LBS | HEART RATE: 72 BPM | TEMPERATURE: 98.8 F | OXYGEN SATURATION: 92 % | SYSTOLIC BLOOD PRESSURE: 113 MMHG | DIASTOLIC BLOOD PRESSURE: 69 MMHG | BODY MASS INDEX: 24.69 KG/M2 | RESPIRATION RATE: 18 BRPM | HEIGHT: 63 IN

## 2019-10-10 LAB
ALBUMIN SERPL BCP-MCNC: 3.5 G/DL (ref 3.2–4.9)
ALBUMIN/GLOB SERPL: 1.8 G/DL
ALP SERPL-CCNC: 53 U/L (ref 30–99)
ALT SERPL-CCNC: 57 U/L (ref 2–50)
ANION GAP SERPL CALC-SCNC: 6 MMOL/L (ref 0–11.9)
AST SERPL-CCNC: 35 U/L (ref 12–45)
BASOPHILS # BLD AUTO: 0.4 % (ref 0–1.8)
BASOPHILS # BLD: 0.02 K/UL (ref 0–0.12)
BILIRUB SERPL-MCNC: 0.2 MG/DL (ref 0.1–1.5)
BUN SERPL-MCNC: 13 MG/DL (ref 8–22)
CALCIUM SERPL-MCNC: 7.8 MG/DL (ref 8.5–10.5)
CHLORIDE SERPL-SCNC: 109 MMOL/L (ref 96–112)
CHOLEST SERPL-MCNC: 157 MG/DL (ref 100–199)
CO2 SERPL-SCNC: 27 MMOL/L (ref 20–33)
CREAT SERPL-MCNC: 0.7 MG/DL (ref 0.5–1.4)
EOSINOPHIL # BLD AUTO: 0.12 K/UL (ref 0–0.51)
EOSINOPHIL NFR BLD: 2.5 % (ref 0–6.9)
ERYTHROCYTE [DISTWIDTH] IN BLOOD BY AUTOMATED COUNT: 44.2 FL (ref 35.9–50)
GLOBULIN SER CALC-MCNC: 1.9 G/DL (ref 1.9–3.5)
GLUCOSE SERPL-MCNC: 85 MG/DL (ref 65–99)
HAV IGM SERPL QL IA: NEGATIVE
HBV CORE IGM SER QL: NEGATIVE
HBV SURFACE AG SER QL: NEGATIVE
HCT VFR BLD AUTO: 32.4 % (ref 37–47)
HCV AB SER QL: NEGATIVE
HDLC SERPL-MCNC: 48 MG/DL
HGB BLD-MCNC: 10.8 G/DL (ref 12–16)
HIV 1+2 AB+HIV1 P24 AG SERPL QL IA: NON REACTIVE
IMM GRANULOCYTES # BLD AUTO: 0 K/UL (ref 0–0.11)
IMM GRANULOCYTES NFR BLD AUTO: 0 % (ref 0–0.9)
LDLC SERPL CALC-MCNC: 85 MG/DL
LYMPHOCYTES # BLD AUTO: 1.95 K/UL (ref 1–4.8)
LYMPHOCYTES NFR BLD: 41.4 % (ref 22–41)
MCH RBC QN AUTO: 30.8 PG (ref 27–33)
MCHC RBC AUTO-ENTMCNC: 33.3 G/DL (ref 33.6–35)
MCV RBC AUTO: 92.3 FL (ref 81.4–97.8)
MONOCYTES # BLD AUTO: 0.38 K/UL (ref 0–0.85)
MONOCYTES NFR BLD AUTO: 8.1 % (ref 0–13.4)
NEUTROPHILS # BLD AUTO: 2.24 K/UL (ref 2–7.15)
NEUTROPHILS NFR BLD: 47.6 % (ref 44–72)
NRBC # BLD AUTO: 0 K/UL
NRBC BLD-RTO: 0 /100 WBC
PLATELET # BLD AUTO: 171 K/UL (ref 164–446)
PMV BLD AUTO: 10.1 FL (ref 9–12.9)
POTASSIUM SERPL-SCNC: 3.6 MMOL/L (ref 3.6–5.5)
PROT SERPL-MCNC: 5.4 G/DL (ref 6–8.2)
RBC # BLD AUTO: 3.51 M/UL (ref 4.2–5.4)
SODIUM SERPL-SCNC: 142 MMOL/L (ref 135–145)
TRIGL SERPL-MCNC: 122 MG/DL (ref 0–149)
WBC # BLD AUTO: 4.7 K/UL (ref 4.8–10.8)

## 2019-10-10 PROCEDURE — 700102 HCHG RX REV CODE 250 W/ 637 OVERRIDE(OP): Performed by: HOSPITALIST

## 2019-10-10 PROCEDURE — 36415 COLL VENOUS BLD VENIPUNCTURE: CPT

## 2019-10-10 PROCEDURE — 80053 COMPREHEN METABOLIC PANEL: CPT

## 2019-10-10 PROCEDURE — 99217 PR OBSERVATION CARE DISCHARGE: CPT | Performed by: INTERNAL MEDICINE

## 2019-10-10 PROCEDURE — 80061 LIPID PANEL: CPT

## 2019-10-10 PROCEDURE — G0378 HOSPITAL OBSERVATION PER HR: HCPCS

## 2019-10-10 PROCEDURE — A9270 NON-COVERED ITEM OR SERVICE: HCPCS | Performed by: HOSPITALIST

## 2019-10-10 PROCEDURE — 87389 HIV-1 AG W/HIV-1&-2 AB AG IA: CPT

## 2019-10-10 PROCEDURE — 85025 COMPLETE CBC W/AUTO DIFF WBC: CPT

## 2019-10-10 PROCEDURE — 80074 ACUTE HEPATITIS PANEL: CPT

## 2019-10-10 PROCEDURE — 97161 PT EVAL LOW COMPLEX 20 MIN: CPT

## 2019-10-10 PROCEDURE — 97165 OT EVAL LOW COMPLEX 30 MIN: CPT

## 2019-10-10 RX ADMIN — ACETAMINOPHEN 650 MG: 325 TABLET, FILM COATED ORAL at 06:25

## 2019-10-10 RX ADMIN — OMEPRAZOLE 40 MG: 20 CAPSULE, DELAYED RELEASE ORAL at 04:48

## 2019-10-10 RX ADMIN — ASPIRIN 325 MG: 325 TABLET, FILM COATED ORAL at 04:48

## 2019-10-10 ASSESSMENT — COGNITIVE AND FUNCTIONAL STATUS - GENERAL
SUGGESTED CMS G CODE MODIFIER DAILY ACTIVITY: CH
MOBILITY SCORE: 24
DAILY ACTIVITIY SCORE: 24
SUGGESTED CMS G CODE MODIFIER MOBILITY: CH

## 2019-10-10 ASSESSMENT — GAIT ASSESSMENTS
GAIT LEVEL OF ASSIST: SUPERVISED
DISTANCE (FEET): 500

## 2019-10-10 ASSESSMENT — ACTIVITIES OF DAILY LIVING (ADL): TOILETING: INDEPENDENT

## 2019-10-10 NOTE — THERAPY
"Physical Therapy Evaluation completed.   Bed Mobility:  Supine to Sit: Supervised  Transfers: Sit to Stand: Supervised  Gait: Level Of Assist: Supervised with No Equipment Needed       Plan of Care: Patient with no further skilled PT needs in the acute care setting at this time  Discharge Recommendations: Equipment: No Equipment Needed. Post-acute therapy: Currently anticipate no further skilled therapy needs once patient is discharged from the inpatient setting.    See \"Rehab Therapy-Acute\" Patient Summary Report for complete documentation.    Patient is a pleasant 59 YO female that was admitted following complaints of transient confusion and AMS. Imaging negative for acute CVA. PMHx significant for acid reflux, frequent headaches, HLD, HTN. She appears to be at baseline functional mobility and with no obvious neurological deficits at time of evaluation. Patient reported headache this morning with blurry vision and paresthesias to L side of face which have resolved. She ambulated approximately 500ft without AD and ascended/descended 3 steps with supervision. Anticipate she will self progress to PLOF following DC home. Patient will not be actively followed for physical therapy services at this time, however may be seen if requested by physician for 1 more visit within 30 days to address any discharge or equipment needs.  "

## 2019-10-10 NOTE — CARE PLAN
Problem: Communication  Goal: The ability to communicate needs accurately and effectively will improve  Outcome: PROGRESSING AS EXPECTED     Problem: Safety  Goal: Will remain free from injury  Outcome: PROGRESSING AS EXPECTED  Goal: Will remain free from falls  Outcome: PROGRESSING AS EXPECTED     Problem: Bowel/Gastric:  Goal: Normal bowel function is maintained or improved  Outcome: PROGRESSING AS EXPECTED

## 2019-10-10 NOTE — PROGRESS NOTES
Bedside report received. Patient A&O x4, NIH Is 0. Complains of headache, currently a 3/10. Will medicate appropriately per MAR. Family is currently at bedside.    POC discussed with patient. Patient verbalized understanding. Call light and belongings within reach. Bed locked and in lowest position, alarm and fall precautions in place.

## 2019-10-10 NOTE — PROGRESS NOTES
Patient seen and examined today  Patient was admitted earlier this morning.  Please see H&P for specific information  Possible TIA MRI negative for acute stroke  Echocardiogram pending.  If echo negative patient probably will be able to get discharged home  Symptoms have recovered.  Patient unable to take statin due to allergy  Continue aspirin.

## 2019-10-10 NOTE — DISCHARGE INSTRUCTIONS
Discharge patient Home by Dirk Pryor MD  Diet healthy with 9-13 servings of fruits and vegetables  Activities as tolerated  Follow ups with PCP in 7-10 days, call for appointment  Meds per med rec sheet  No smoking, no alcohol, no caffeine  Wear seat belt in motorized vehicle  Take medications as perscribed  Keep appointments  If symptoms worsen call PCP, 911 or urgent care.    Discharge Instructions    Discharged to home by car with relative. Discharged via walking, hospital escort: Yes.  Special equipment needed: Not Applicable    Be sure to schedule a follow-up appointment with your primary care doctor or any specialists as instructed.     Discharge Plan:   Diet Plan: (P) Discussed  Activity Level: (P) Discussed  Confirmed Follow up Appointment: (P) Patient to Call and Schedule Appointment  Confirmed Symptoms Management: (P) Discussed  Medication Reconciliation Updated: (P) Yes  Influenza Vaccine Indication: Not indicated: Previously immunized this influenza season and > 8 years of age    I understand that a diet low in cholesterol, fat, and sodium is recommended for good health. Unless I have been given specific instructions below for another diet, I accept this instruction as my diet prescription.   Other diet: Healthy    Special Instructions: None    · Is patient discharged on Warfarin / Coumadin?   No     Depression / Suicide Risk    As you are discharged from this [facility] facility, it is important to learn how to keep safe from harming yourself.    Recognize the warning signs:  · Abrupt changes in personality, positive or negative- including increase in energy   · Giving away possessions  · Change in eating patterns- significant weight changes-  positive or negative  · Change in sleeping patterns- unable to sleep or sleeping all the time   · Unwillingness or inability to communicate  · Depression  · Unusual sadness, discouragement and loneliness  · Talk of wanting to die  · Neglect of personal  appearance   · Rebelliousness- reckless behavior  · Withdrawal from people/activities they love  · Confusion- inability to concentrate     If you or a loved one observes any of these behaviors or has concerns about self-harm, here's what you can do:  · Talk about it- your feelings and reasons for harming yourself  · Remove any means that you might use to hurt yourself (examples: pills, rope, extension cords, firearm)  · Get professional help from the community (Mental Health, Substance Abuse, psychological counseling)  · Do not be alone:Call your Safe Contact- someone whom you trust who will be there for you.  · Call your local CRISIS HOTLINE 596-4012 or 641-439-3597  · Call your local Children's Mobile Crisis Response Team [place] (716) 938-6259 or www.[facility]  · Call the toll free National Suicide Prevention Hotlines   · National Suicide Prevention Lifeline 397-774-QUQS (4800)  · Mobile Labs Line Network 800-SUICIDE (312-6425)    Transient Global Amnesia  Transient global amnesia causes a sudden and temporary (transient) loss of memory (amnesia). While you may recall memories from your distant past, including being able to recognize people you know well, you may not recall things that happened more recently in the past days, months, or even year. A transient global amnesia episode does not last longer than 24 hours.  Transient global amnesia does not affect your other brain functions. Your memory usually returns to normal after an episode is over. One episode of transient global amnesia does not make you more likely to have a stroke, a relapse, or other complications.  What are the causes?  The cause of this condition is not known.  What increases the risk?  Transient global amnesia is more likely to develop in people who:  · Are 50-70 years old.  · Have a history of migraine headaches.  What are the signs or symptoms?  The main symptoms of this condition include:  · The inability to remember  recent events.  · Asking repetitive questions about the situation and surroundings and not recalling the answers to these questions.  Other symptoms include:  · Restlessness and nervousness.  · Confusion.  · Headaches.  · Dizziness.  · Nausea.  How is this diagnosed?  Your health care provider may suspect transient global amnesia based on your symptoms. Your health care provider will do a physical exam. This may include a test to check your mental abilities (cognitive evaluation). You may also have imaging studies done to check your brain function. These may include:  · Electroencephalography (EEG).  · Diffusion-weighted imaging (DWI).  · MRI.  How is this treated?  There is no treatment for this condition. An episode typically goes away on its own after a few hours. If you also have a seizure or migraine during an episode, you will receive treatment for these conditions. This may include medicines.  Follow these instructions at home:  · Take medicines only as directed by your health care provider.  · Tell your family or friends that you have transient global amnesia. Ask them to help you avoid physical exertion, including sexual intercourse, swimming, and straining while holding your breath (Valsalva maneuver), until the episode passes. These are events that can bring on transient global amnesia attacks.  Contact a health care provider if:  · You have a migraine and it does not go away after you have followed your treatment plan for this condition.  · You have a seizure for the first time, or a seizure that is different from seizures you normally have.  · You experience transient global amnesia repeatedly.  This information is not intended to replace advice given to you by your health care provider. Make sure you discuss any questions you have with your health care provider.  Document Released: 01/25/2006 Document Revised: 08/21/2017 Document Reviewed: 09/02/2015  Elsevier Interactive Patient Education © 2017  "[employer]    Stress  Stress-related medical problems are becoming increasingly common.  The body has a built-in physical response to stressful situations. Faced with pressure, challenge or danger, we need to react quickly. Our bodies release hormones such as cortisol and adrenaline to help do this. These hormones are part of the \"fight or flight\" response and affect the metabolic rate, heart rate and blood pressure, resulting in a heightened, stressed state that prepares the body for optimum performance in dealing with a stressful situation.  It is likely that early man required these mechanisms to stay alive, but usually modern stresses do not call for this, and the same hormones released in today's world can damage health and reduce coping ability.  CAUSES  · Pressure to perform at work, at school or in sports.  · Threats of physical violence.  · Money worries.  · Arguments.  · Family conflicts.  · Divorce or separation from significant other.  · Bereavement.  · New job or unemployment.  · Changes in location.  · Alcohol or drug abuse.  SOMETIMES, THERE IS NO PARTICULAR REASON FOR DEVELOPING STRESS.  Almost all people are at risk of being stressed at some time in their lives. It is important to know that some stress is temporary and some is long term.  · Temporary stress will go away when a situation is resolved. Most people can cope with short periods of stress, and it can often be relieved by relaxing, taking a walk, chatting through issues with friends, or having a good night's sleep.  · Chronic (long-term, continuous) stress is much harder to deal with. It can be psychologically and emotionally damaging. It can be harmful both for an individual and for friends and family.  SYMPTOMS  Everyone reacts to stress differently. There are some common effects that help us recognize it. In times of extreme stress, people may:  · Shake uncontrollably.  · Breathe faster and deeper than normal " (hyperventilate).  · Vomit.  · For people with asthma, stress can trigger an attack.  · For some people, stress may trigger migraine headaches, ulcers, and body pain.  PHYSICAL EFFECTS OF STRESS MAY INCLUDE:  · Loss of energy.  · Skin problems.  · Aches and pains resulting from tense muscles, including neck ache, backache and tension headaches.  · Increased pain from arthritis and other conditions.  · Irregular heart beat (palpitations).  · Periods of irritability or anger.  · Apathy or depression.  · Anxiety (feeling uptight or worrying).  · Unusual behavior.  · Loss of appetite.  · Comfort eating.  · Lack of concentration.  · Loss of, or decreased, sex-drive.  · Increased smoking, drinking, or recreational drug use.  · For women, missed periods.  · Ulcers, joint pain, and muscle pain.  Post-traumatic stress is the stress caused by any serious accident, strong emotional damage, or extremely difficult or violent experience such as rape or war.  Post-traumatic stress victims can experience mixtures of emotions such as fear, shame, depression, guilt or anger. It may include recurrent memories or images that may be haunting. These feelings can last for weeks, months or even years after the traumatic event that triggered them. Specialized treatment, possibly with medicines and psychological therapies, is available.  If stress is causing physical symptoms, severe distress or making it difficult for you to function as normal, it is worth seeing your caregiver. It is important to remember that although stress is a usual part of life, extreme or prolonged stress can lead to other illnesses that will need treatment. It is better to visit a doctor sooner rather than later. Stress has been linked to the development of high blood pressure and heart disease, as well as insomnia and depression.  There is no diagnostic test for stress since everyone reacts to it differently. But a caregiver will be able to spot the physical  symptoms, such as:  · Headaches.  · Shingles.  · Ulcers.  Emotional distress such as intense worry, low mood or irritability should be detected when the doctor asks pertinent questions to identify any underlying problems that might be the cause. In case there are physical reasons for the symptoms, the doctor may also want to do some tests to exclude certain conditions.  If you feel that you are suffering from stress, try to identify the aspects of your life that are causing it. Sometimes you may not be able to change or avoid them, but even a small change can have a positive ripple effect. A simple lifestyle change can make all the difference.  STRATEGIES THAT CAN HELP DEAL WITH STRESS:  · Delegating or sharing responsibilities.  · Avoiding confrontations.  · Learning to be more assertive.  · Regular exercise.  · Avoid using alcohol or street drugs to cope.  · Eating a healthy, balanced diet, rich in fruit and vegetables and proteins.  · Finding humor or absurdity in stressful situations.  · Never taking on more than you know you can handle comfortably.  · Organizing your time better to get as much done as possible.  · Talking to friends or family and sharing your thoughts and fears.  · Listening to music or relaxation tapes.  · Tensing and then relaxing your muscles, starting at the toes and working up to the head and neck.  If you think that you would benefit from help, either in identifying the things that are causing your stress or in learning techniques to help you relax, see a caregiver who is capable of helping you with this. Rather than relying on medications, it is usually better to try and identify the things in your life that are causing stress and try to deal with them.  There are many techniques of managing stress including counseling, psychotherapy, aromatherapy, yoga, and exercise. Your caregiver can help you determine what is best for you.  Document Released: 03/09/2004 Document Revised: 03/11/2013  Document Reviewed: 02/03/2009  ExitCare® Patient Information ©2014 [employer].      Stroke/CVA/TIA/Hemorrhagic Ischemia Discharge Instructions  You have had a stroke. Your risk factors have been identified as follows:  Age - Over 55  It is important that you reduce your risk factors to avoid another stroke in the future. Here are some general guidelines to follow:  · Eat healthy - avoid food high in fat.  · Get regular exercise.  · Maintain a healthy weight.  · Avoid smoking.  · Avoid alcohol and illegal drug use.  · Take your medications as directed.  For more information regarding risk factors, refer to pages 17-19 in your Stroke Patient Education Guide. Stroke Education Guide was given to patient.    Warning signs of a stroke include (which can also be found on page 3 of your Stroke Patient Education Guide):  · Sudden numbness of weakness of the face, arm or leg (especially on one side of the body).  · Sudden confusion, trouble speaking or understanding.  · Sudden trouble seeing in one or both eyes.  · Sudden trouble walking, dizziness, loss of balance or coordination.  · Sudden severe headache with no known cause.  It is very important to get treatment quickly when a stroke occurs. If you experience any of the above warning signs, call [phone] immediately.     Some patients who have had a stroke will be going home on a blood thinner medication called Warfarin (Coumadin).  This medication requires very close monitoring and follow up.  This follow up can be provided by either your Primary Care Physician or by [facility] Outpatient Anticoagulation Service.  The Outpatient Anticoagulation Service is located at the [facility] at [facility] ([facility]  If you do not know when your follow up appointment is scheduled, call [phone] to verify your appointment time.        Depression / Suicide Risk    As you are discharged from this [facility], it is  important to learn how to keep safe from harming yourself.    Recognize the warning signs:  · Abrupt changes in personality, positive or negative- including increase in energy   · Giving away possessions  · Change in eating patterns- significant weight changes-  positive or negative  · Change in sleeping patterns- unable to sleep or sleeping all the time   · Unwillingness or inability to communicate  · Depression  · Unusual sadness, discouragement and loneliness  · Talk of wanting to die  · Neglect of personal appearance   · Rebelliousness- reckless behavior  · Withdrawal from people/activities they love  · Confusion- inability to concentrate     If you or a loved one observes any of these behaviors or has concerns about self-harm, here's what you can do:  · Talk about it- your feelings and reasons for harming yourself  · Remove any means that you might use to hurt yourself (examples: pills, rope, extension cords, firearm)  · Get professional help from the community (Mental Health, Substance Abuse, psychological counseling)  · Do not be alone:Call your Safe Contact- someone whom you trust who will be there for you.  · Call your local CRISIS HOTLINE 649-6001 or 555-499-1605  · Call your local Children's Mobile Crisis Response Team [place] (680) 410-5274 or www.[employer] Newsletter  · Call the toll free National Suicide Prevention Hotlines   · National Suicide Prevention Lifeline 337-288-USOJ (5472)  · National Hope Line Network 800-SUICIDE (574-7885)

## 2019-10-10 NOTE — DISCHARGE PLANNING
Follow up for rehab MRI negative for acute event. Therapy evaluation indicating no need for inpatient post acute therapy. No physiatry consult ordered per protocol.

## 2019-10-10 NOTE — PROGRESS NOTES
Pt dc'd Home. IV and monitor removed; monitor room notified. Pt left unit via walking with family. Personal belongings with pt when leaving unit. Pt given discharge instructions prior to leaving unit including when to visit with physician; verbalizes understanding. Copy of discharge instructions with pt and in the chart.

## 2019-10-10 NOTE — THERAPY
"Occupational Therapy Evaluation completed.   Functional Status:  Supervision supine to sit ->sit to stand ->sit to supine.  Pt able to dress self without assistance.  Pt reports no difficulty with toileting/transfers.  Plan of Care: Patient with no further skilled OT needs in the acute care setting at this time  Discharge Recommendations:  Equipment: No Equipment Needed. .Anticipate that the patient will have no further occupational therapy needs after discharge from the hospital.     Pt is 57 y/o F seen for OT evalaution.  Pt admitted with confusion and diagnosed with transient global amnesia.  Pt reports memory deficits have resolved but she has lingering headache.  Pt reports very high amounts of stress recently.  Pt demonstrated no deficits in self-care, UE's, or vision.  Pt completed Short Blessed Test (cognitive screen) without any errors.  Pt with no further acute OT needs.     See \"Rehab Therapy-Acute\" Patient Summary Report for complete documentation.    "

## 2019-10-10 NOTE — PROGRESS NOTES
· 2 RN skin check complete with JONATHON Pennington.  · Devices in place: None  · Skin assessed under devices: None  · Confirmed pressure ulcers found on: None  · New potential pressure ulcers noted on: None  · Heels red and blanching, no redness or discoloration on sacrum, small lump under skin on left posterior mid back.   · The following interventions in place: Encouraged frequent turning, will continue to monitor.

## 2019-10-11 NOTE — DISCHARGE SUMMARY
Discharge Summary    CHIEF COMPLAINT ON ADMISSION  Chief Complaint   Patient presents with   • Possible Stroke     Sudden onset memory loss. No other defecits.       Reason for Admission  EMS     Admission Date  10/9/2019    CODE STATUS  Prior    HPI & HOSPITAL COURSE  This is a 58 y.o. female here with above medical issues. Patient presented with total global amnesia. Code stroke was called in the ER. Outside the window both tPA and thrombolytics. Initial CT imaging was normal. She was admitted to the telemetry floor and no noted arrhythmias. MRI brain negative. Symptoms resolved and she returned to baseline. Her ECHo showed possible PFO/ASD, but otherwise normal. Patients TGA is likely related to stress.  Her hepatitis panel and HIV screening were both negative. She is stable to go home and we discussed stress reduction techniques.       Therefore, she is discharged in fair and stable condition to home with close outpatient follow-up.      Discharge Date  10/10/2019    FOLLOW UP ITEMS POST DISCHARGE  F/U with her PCP in 2 weeks    DISCHARGE DIAGNOSES  Principal Problem:    Transient global amnesia POA: Unknown  Active Problems:    Gastroesophageal reflux disease without esophagitis POA: Yes      Overview: EGD on 10/16/14 showed non bleeding esophageal ulcer. Biopsy ruled out       eosinophilic esophagitis, Dewitt's esophagus, celiac disease and H       pylori.      Follow with digestive health.    History of hypothyroidism POA: Yes    Pure hypercholesterolemia POA: Yes    Essential hypertension POA: Yes    Mediastinal adenopathy POA: Unknown  Resolved Problems:    * No resolved hospital problems. *      FOLLOW UP  Future Appointments   Date Time Provider Department Center   10/14/2019  4:20 PM Juany Ralph M.D. [address]   3/18/2020  2:20 PM VASCULAR NURSE PRACTITIONER [facility] None     [address] [id number]  [phone]            MEDICATIONS ON DISCHARGE     Medication  List      CONTINUE taking these medications      Instructions   albuterol 108 (90 Base) MCG/ACT Aers inhalation aerosol   Inhale 2 Puffs by mouth every 6 hours as needed for Shortness of Breath.  Dose:  2 Puff     CHOLESTOFF PO   Take 1 Tab by mouth 2 Times a Day.  Dose:  1 Tab     coenzyme Q-10 30 MG capsule   Take 30 mg by mouth every morning.  Dose:  30 mg     Diclofenac Sodium 1 % Gel   Apply 2 gram on upper extremities twice a day as needed.     ezetimibe 10 MG Tabs  Commonly known as:  ZETIA   Take 10 mg by mouth every morning.  Dose:  10 mg     fluticasone 50 MCG/ACT nasal spray  Commonly known as:  FLONASE   Spray 2 Sprays in nose every morning as needed (ALLERGY).  Dose:  2 Spray     lisinopril 20 MG Tabs  Commonly known as:  PRINIVIL   Take 20 mg by mouth every morning.  Dose:  20 mg     meclizine 25 MG Tabs  Commonly known as:  ANTIVERT   Take 25 mg by mouth 3 times a day as needed for Nausea/Vomiting.  Dose:  25 mg     NON SPECIFIED   1 Each by Injection route Q30 DAYS. SEASONAL SHOT  Dose:  1 Each     omeprazole 40 MG delayed-release capsule  Commonly known as:  PRILOSEC   Take 40 mg by mouth every evening.  Dose:  40 mg     PROBIOTIC COLON SUPPORT PO   Take 1 Cap by mouth every morning.  Dose:  1 Cap     scopolamine 1 mg/72hr Pt72  Commonly known as:  TRANSDERM-SCOP   Apply 1 Patch to skin as directed every 3 days as needed (NAUSEA).  Dose:  1 Patch     Vitamin D3 2000 UNIT Caps   Take 4,000 Units by mouth every day.  Dose:  4,000 Units     WOMENS 50+ MULTI VITAMIN/MIN PO   Take 1 Tab by mouth every morning.  Dose:  1 Tab     ZOFRAN 4 MG Tabs tablet  Generic drug:  ondansetron   Take 4 mg by mouth every four hours as needed for Nausea/Vomiting.  Dose:  4 mg            Allergies  Allergies   Allergen Reactions   • Ciprofloxacin Hives and Nausea     Okay to take cipro ear drops, no reaction with ear drop   • Hydrocodone Rash     RASH     • Sulfa Drugs Rash and Itching     Not had but was told not to use    • Latex Rash and Itching     Tape burns and rash- paper tape OK   • Other Environmental Itching     seasonal   • Statins [Hmg-Coa-R Inhibitors] Unspecified     Hypertensive Crisis       DIET  Heart healthy    ACTIVITY  As tolerated.  Weight bearing as tolerated    CONSULTATIONS  Neurology    PROCEDURES  EC-ECHOCARDIOGRAM COMPLETE W/O CONT   Final Result      MR-BRAIN-W/O   Final Result         1. Age-related cerebral atrophy.      2. Mild periventricular white matter changes consistent with chronic microvascular ischemic gliosis.      DX-CHEST-PORTABLE (1 VIEW)   Final Result         1.  No acute cardiopulmonary disease.      CT-CTA HEAD WITH & W/O-POST PROCESS   Final Result         1.  CT angiogram of the [place] within normal limits.      CT-CTA NECK WITH & W/O-POST PROCESSING   Final Result         1.  CT angiogram of the neck within normal limits.   2.  Soft tissue prominence in the right superior hilum, could represent enlarged lymph node. Mild mediastinal adenopathy is seen. Recommend nonemergent follow-up contrast-enhanced CT chest for further evaluation.      CT-CEREBRAL PERFUSION ANALYSIS   Final Result         1.  Cerebral blood flow less than 30% likely representing completed infarct = 0 mL.      2.  T Max more than 6 seconds likely representing combination of completed infarct and ischemia = 0 mL.      3.  Mismatched volume likely representing ischemic brain/penumbra = None      4.  Please note that the cerebral perfusion was performed on the limited brain tissue around the basal ganglia region. Infarct/ischemia outside the CT perfusion sections can be missed in this study.      CT-HEAD W/O   Final Result         1.  No acute intracranial abnormality.            LABORATORY  Lab Results   Component Value Date    SODIUM 142 10/10/2019    POTASSIUM 3.6 10/10/2019    CHLORIDE 109 10/10/2019    CO2 27 10/10/2019    GLUCOSE 85 10/10/2019    BUN 13 10/10/2019    CREATININE 0.70 10/10/2019        Lab  Results   Component Value Date    WBC 4.7 (L) 10/10/2019    HEMOGLOBIN 10.8 (L) 10/10/2019    HEMATOCRIT 32.4 (L) 10/10/2019    PLATELETCT 171 10/10/2019        Total time of the discharge process exceeds 45 minutes.

## 2019-10-13 LAB — VIT B1 BLD-MCNC: 114 NMOL/L (ref 70–180)

## 2019-10-14 ENCOUNTER — OFFICE VISIT (OUTPATIENT)
Dept: MEDICAL GROUP | Age: 59
End: 2019-10-14
Payer: COMMERCIAL

## 2019-10-14 VITALS
DIASTOLIC BLOOD PRESSURE: 68 MMHG | SYSTOLIC BLOOD PRESSURE: 144 MMHG | TEMPERATURE: 98.4 F | HEIGHT: 63 IN | WEIGHT: 137.6 LBS | BODY MASS INDEX: 24.38 KG/M2 | HEART RATE: 66 BPM | OXYGEN SATURATION: 97 %

## 2019-10-14 DIAGNOSIS — Z09 HOSPITAL DISCHARGE FOLLOW-UP: ICD-10-CM

## 2019-10-14 DIAGNOSIS — F43.29 STRESS AND ADJUSTMENT REACTION: ICD-10-CM

## 2019-10-14 DIAGNOSIS — I10 ESSENTIAL HYPERTENSION: ICD-10-CM

## 2019-10-14 DIAGNOSIS — G45.4 TRANSIENT GLOBAL AMNESIA: ICD-10-CM

## 2019-10-14 PROCEDURE — 99214 OFFICE O/P EST MOD 30 MIN: CPT | Performed by: INTERNAL MEDICINE

## 2019-10-14 ASSESSMENT — PAIN SCALES - GENERAL: PAINLEVEL: 3=SLIGHT PAIN
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
## 2019-10-31 ENCOUNTER — OFFICE VISIT (OUTPATIENT)
Dept: VASCULAR LAB | Facility: MEDICAL CENTER | Age: 59
End: 2019-10-31
Attending: FAMILY MEDICINE
Payer: COMMERCIAL

## 2019-10-31 VITALS
BODY MASS INDEX: 23.67 KG/M2 | HEIGHT: 63 IN | SYSTOLIC BLOOD PRESSURE: 114 MMHG | HEART RATE: 65 BPM | DIASTOLIC BLOOD PRESSURE: 62 MMHG | WEIGHT: 133.6 LBS

## 2019-10-31 DIAGNOSIS — D50.9 IRON DEFICIENCY ANEMIA, UNSPECIFIED IRON DEFICIENCY ANEMIA TYPE: ICD-10-CM

## 2019-10-31 DIAGNOSIS — E55.9 VITAMIN D INSUFFICIENCY: ICD-10-CM

## 2019-10-31 DIAGNOSIS — Z78.9 STATIN INTOLERANCE: ICD-10-CM

## 2019-10-31 DIAGNOSIS — I10 ESSENTIAL HYPERTENSION: ICD-10-CM

## 2019-10-31 DIAGNOSIS — E78.49 OTHER HYPERLIPIDEMIA: ICD-10-CM

## 2019-10-31 PROCEDURE — 99212 OFFICE O/P EST SF 10 MIN: CPT

## 2019-10-31 PROCEDURE — 99214 OFFICE O/P EST MOD 30 MIN: CPT | Performed by: FAMILY MEDICINE

## 2019-10-31 ASSESSMENT — ENCOUNTER SYMPTOMS
PALPITATIONS: 1
DOUBLE VISION: 0
ORTHOPNEA: 0
BLURRED VISION: 0
COUGH: 0
FEVER: 0
NAUSEA: 0
SORE THROAT: 0
NERVOUS/ANXIOUS: 0
SHORTNESS OF BREATH: 0
BLOOD IN STOOL: 0
BRUISES/BLEEDS EASILY: 0
MYALGIAS: 0
VOMITING: 0
CHILLS: 0
TREMORS: 0
DIARRHEA: 0
SEIZURES: 0
FOCAL WEAKNESS: 0
DEPRESSION: 0
HEMOPTYSIS: 0
INSOMNIA: 0
WEAKNESS: 0
ABDOMINAL PAIN: 0
WHEEZING: 0
HEADACHES: 0
DIZZINESS: 0

## 2019-10-31 NOTE — PATIENT INSTRUCTIONS
1) continue current medications   2) keep BP log - call if >140/>90 consistently for 3-4 days   3) continue stress management and 4-7-8 breathing   4) see counseling, continue all other medications   5) review internet on paroxysmal hypertension     Physical activity:  - engage in moderate to vigorous physical activity such as brisk walking, swimming, cycling, >150 minutes per week at 30-40 minutes per session, 3 to 5 times weekly or as directed at today's visit     General healthly nutrition advice:  - the USDA food pattern (https://www.cnpp.usda.gov/USDAFoodPatterns)  - plate method (https://www.choosemyplate.gov/)  - consume diet that emphasizes intake of vegetables, fruits, and whole grains,  - use low fat diary products, poultry, fish, legumes, nontropical vegetable oils, nuts  - limit intake of sweets, sugar-sweetned beverages, and red meats   - reduce saturated and trans fats to <6% of your daily calories     BP and BP lower diet:  SODIUM RESTRICTIONS: - consume no more than 2,300mg of sodium daily (look at food labels) ,or if you have high BP then reduce to no more than 1,500mg of sodium daily   For more information visit: https://www.Haowj.com/contents/low-sodium-diet-the-basics/print?irikbYoe=9809&source=see_link     - if you notice BP > 140/>90 for more than 3 days, then contact our office for further instructions    - DASH diet   (https://www.heart.org/en/health-topics/high-blood-pressure/changes-you-can-make-to-manage-high-blood-pressure/managing-blood-pressure-with-a-heart-healthy-diet)    Cholesterol-reducing diets:   - AHA diet  (http://www.heart.org/en/healthy-living/healthy-eating/eat-smart/nutrition-basics/aha-diet-and-lifestyle-recommendations)   - Mediterranean diet (http://www.heart.org/en/healthy-living/healthy-eating/eat-smart/nutrition-basics/mediterranean-diet)   - lowering triglycerides:  (http://my.americanheart.org/idc/groups/ahamah-public/@[facility]/@sop/@[facility]/documents/downloadable/[facility]_425988.pdf)

## 2019-10-31 NOTE — PROGRESS NOTES
FOLLOW-UP VASCULAR MED VISIT  Subjective:   Danii Rodriguez is a 59 y.o. female who presents today 10/31/19 for   Chief Complaint   Patient presents with   • Follow-Up     Pure hypercholesterolemia, Hypertension   Referred by Dr. Ralph (PCP) for evaluation and mgmt of HLD in context of prior statin intolerance    HPI:     HTN:  Current HTN concerns: Recently admitted for global amnesia.  Had normal recovery, no abnl imaging.  Noted to have 3 pt drop in hgb over 1 day w/o any bleeding - could be lab error (?).  No other etiologies found.   Denies etoh, drugs, new meds or supplements.   HTN sx:  No current blurred or changed vision, chest pain, shortness of breath, headache, nausea, dizziness/vertigo   Home BP log:  Had been >130s/70s  Adherence to current HTN meds: compliant all of the time     Antiplatelet/anticoagulation:   No stopped as not in qualifying group    Hyperlipidemia:    Stable, no current concerns  Current treatment: zetia, plant sterols, no statin   Myalgias? no  Other adverse drug reactions? Prior use of rosuvastatin led to depressive sx that improved after cessation   Lipid profile:   Lab Results   Component Value Date/Time    CHOLSTRLTOT 157 10/10/2019 0231    TRIGLYCERIDE 122 10/10/2019 0231    HDL 48 10/10/2019 0231    LDL 85 10/10/2019 0231     LDL (mg/dL)   Date Value   10/10/2019 85   09/05/2019 152 (H)     HDL (mg/dL)   Date Value   10/10/2019 48   09/05/2019 80     Pertinent HTN hx:   Age at HTN dx:  1 year ago   (if female, any hx of pregnancy-related HTN):  None   Past HTN medications: had been on metoprolol and felt fatigued and had to stop due to allergy testing and fatigue.  Currently lisinopril   HTN crises:  No prior hospitalization or crises, 6/2018 - admitted for elevated BP with negative cardiac w/u  ASCVD risk factors:     DM: No   CKD:  No  Lifestyle factors:     High salt: No   EtOH: wine, 6 drinks/week   Interfering substances:     NSAIDs: avoids    Decongestants. No, using  allergy shots to help     ASCVD calculator (contraindicated if ASCVD+, [id number])   10yr-risk ca.7%     Clinical evidence of ASCVD:     1) hx of MI/ACS:  No   2) coronary or other revascularization procedure: No   3) TIA/ischemic CVA: No   4) PAD (including CLARIBEL <0.9): No   5) documented (CAD, Renal athero, AA due to athero, carotid plaque >50% stenosis: No    Major RFs:     1) Age (Men>44, women>54):  yes   2) Fhx early CAD (<55 men, <65 women):  no   3) cigarette smoking:  No   4) high BP >139/89 or on tx: yes   5) Low HDL-C (<40 men, <50 women):  no    Social History     Tobacco Use   Smoking Status Former Smoker   • Last attempt to quit: 1976   • Years since quittin.8   Smokeless Tobacco Never Used     Social History     Tobacco Use   • Smoking status: Former Smoker     Last attempt to quit: 1976     Years since quittin.8   • Smokeless tobacco: Never Used   Substance Use Topics   • Alcohol use: Yes     Alcohol/week: 3.6 oz     Types: 6 Glasses of wine per week     Frequency: 4 or more times a week     Drinks per session: 1 or 2     Binge frequency: Never   • Drug use: No     Outpatient Encounter Medications as of 10/31/2019   Medication Sig Dispense Refill   • ezetimibe (ZETIA) 10 MG Tab Take 10 mg by mouth every morning.     • fluticasone (FLONASE) 50 MCG/ACT nasal spray Spray 2 Sprays in nose every morning as needed (ALLERGY).     • lisinopril (PRINIVIL) 20 MG Tab Take 20 mg by mouth every morning.     • meclizine (ANTIVERT) 25 MG Tab Take 25 mg by mouth 3 times a day as needed for Nausea/Vomiting.     • ondansetron (ZOFRAN) 4 MG Tab tablet Take 4 mg by mouth every four hours as needed for Nausea/Vomiting.     • scopolamine (TRANSDERM-SCOP) 1 mg/72hr PATCH 72 HR Apply 1 Patch to skin as directed every 3 days as needed (NAUSEA).     • NON SPECIFIED 1 Each by Injection route Q30 DAYS. SEASONAL SHOT     • Plant Sterols and Stanols (CHOLESTOFF PO) Take 1 Tab by mouth 2 Times a Day.     •  Diclofenac Sodium 1 % Gel Apply 2 gram on upper extremities twice a day as needed. 1 Tube 1   • coenzyme Q-10 30 MG capsule Take 30 mg by mouth every morning.     • Multiple Vitamins-Minerals (WOMENS 50+ MULTI VITAMIN/MIN PO) Take 1 Tab by mouth every morning.     • albuterol 108 (90 Base) MCG/ACT Aero Soln inhalation aerosol Inhale 2 Puffs by mouth every 6 hours as needed for Shortness of Breath.     • omeprazole (PRILOSEC) 40 MG delayed-release capsule Take 40 mg by mouth every evening.  5   • Probiotic Product (PROBIOTIC COLON SUPPORT PO) Take 1 Cap by mouth every morning.     • Cholecalciferol (VITAMIN D3) 2000 UNIT Cap Take 4,000 Units by mouth every day.       No facility-administered encounter medications on file as of 10/31/2019.      Allergies   Allergen Reactions   • Ciprofloxacin Hives and Nausea     Okay to take cipro ear drops, no reaction with ear drop   • Hydrocodone Rash     RASH     • Sulfa Drugs Rash and Itching     Not had but was told not to use   • Latex Rash and Itching     Tape burns and rash- paper tape OK   • Other Environmental Itching     seasonal   • Statins [Hmg-Coa-R Inhibitors] Unspecified     Hypertensive Crisis     DIET AND EXERCISE:  Weight Change: stable  BMI Readings from Last 4 Encounters:   04/18/19 24.80 kg/m²   03/28/19 24.80 kg/m²   12/11/18 24.27 kg/m²   11/28/18 24.98 kg/m²     Diet: low fat  Exercise: minimal exercise     Review of Systems   Constitutional: Negative for chills, fever and malaise/fatigue.   HENT: Negative for nosebleeds, sore throat and tinnitus.    Eyes: Negative for blurred vision and double vision.   Respiratory: Negative for cough, hemoptysis, shortness of breath and wheezing.    Cardiovascular: Positive for palpitations. Negative for chest pain, orthopnea and leg swelling.   Gastrointestinal: Negative for abdominal pain, blood in stool, diarrhea, melena, nausea and vomiting.   Genitourinary: Negative for hematuria.   Musculoskeletal: Negative for joint  "pain and myalgias.   Skin: Negative for itching and rash.   Neurological: Negative for dizziness, tremors, focal weakness, seizures, weakness and headaches.   Endo/Heme/Allergies: Does not bruise/bleed easily.   Psychiatric/Behavioral: Negative for depression. The patient is not nervous/anxious and does not have insomnia.       Objective:     Vitals:    10/31/19 1110 10/31/19 1116   BP: (!) 97/58 114/62   BP Location: Right arm Right arm   Patient Position: Sitting Sitting   BP Cuff Size: Small adult Small adult   Pulse: 68 65   Weight: 60.6 kg (133 lb 9.6 oz)    Height: 1.6 m (5' 3\")       BP Readings from Last 4 Encounters:   10/31/19 114/62   10/14/19 144/68   10/10/19 113/69   10/03/19 104/56     Body mass index is 23.67 kg/m².     Physical Exam   Constitutional: She is oriented to person, place, and time. She appears well-developed and well-nourished. No distress.   Cardiovascular: Normal rate, regular rhythm, normal heart sounds and intact distal pulses. Exam reveals no gallop and no friction rub.   No murmur heard.  Pulses:       Carotid pulses are 2+ on the right side, and 2+ on the left side.       Radial pulses are 2+ on the right side, and 2+ on the left side.        Dorsalis pedis pulses are 2+ on the right side, and 2+ on the left side.        Posterior tibial pulses are 2+ on the right side, and 2+ on the left side.   Edema     RLE: none     LLE: none   Spider telangectasia:       RLE:  None      LLE: none   Varicosities:         RLE: none      LLE: none   Corona phlebectatica:      RLE:  None       LLE:  None   Cording:         RLE:  None     LLE: None      Pulmonary/Chest: Effort normal and breath sounds normal.   Musculoskeletal: Normal range of motion. She exhibits no edema or tenderness.   Neurological: She is alert and oriented to person, place, and time. She exhibits normal muscle tone. Coordination normal.   Skin: Skin is warm and dry. She is not diaphoretic.   Psychiatric: She has a normal " mood and affect.     Lab Results   Component Value Date    CHOLSTRLTOT 157 10/10/2019    LDL 85 10/10/2019    HDL 48 10/10/2019    TRIGLYCERIDE 122 10/10/2019    LDLPART 21.2 09/05/2019    LDLPART 1726 (H) 09/05/2019    SMLLDL 572 09/05/2019    LHDLPART 9.2 09/05/2019    LVLDLPT 4.0 (H) 09/05/2019    LDLCHOL 143 (H) 09/05/2019    HDLSIZE 9.0 09/05/2019    VLDLSIZE 48.8 (H) 09/05/2019    HDLPART >41.0 09/05/2019      Lab Results   Component Value Date    PROTHROMBTM 11.9 (L) 10/09/2019    INR 0.86 (L) 10/09/2019       Lab Results   Component Value Date    HBA1C 5.2 10/09/2019      Lab Results   Component Value Date    SODIUM 142 10/10/2019    POTASSIUM 3.6 10/10/2019    CHLORIDE 109 10/10/2019    CO2 27 10/10/2019    GLUCOSE 85 10/10/2019    BUN 13 10/10/2019    CREATININE 0.70 10/10/2019    IFAFRICA >60 10/10/2019    IFNOTAFR >60 10/10/2019        Lab Results   Component Value Date    WBC 4.7 (L) 10/10/2019    RBC 3.51 (L) 10/10/2019    HEMOGLOBIN 10.8 (L) 10/10/2019    HEMATOCRIT 32.4 (L) 10/10/2019    MCV 92.3 10/10/2019    MCH 30.8 10/10/2019    MCHC 33.3 (L) 10/10/2019    MPV 10.1 10/10/2019      VASCULAR IMAGING:     MPI 6/5/18   NUCLEAR IMAGING INTERPRETATION   Normal left ventricular size, ejection fraction, and wall motion.   No evidence of significant jeopardized viable myocardium or prior myocardial    infarction.   ECG INTERPRETATION   Negative stress ECG for ischemia.    Echo 10/9/19  No prior study is available for comparison.   Evidence of early (0-5 beats) right to left shunt suggestive of   intracardiac shunt (ASD or PFO).  Left ventricular ejection fraction is visually estimated to be 60%.  Normal left ventricular systolic function.   No significant valve disease or flow abnormalities.     CT perfusion 10/9/19  1.  Cerebral blood flow less than 30% likely representing completed infarct = 0 mL.  2.  T Max more than 6 seconds likely representing combination of completed infarct and ischemia = 0  mL.  3.  Mismatched volume likely representing ischemic brain/penumbra = None  4.  Please note that the cerebral perfusion was performed on the limited brain tissue around the basal ganglia region. Infarct/ischemia outside the CT perfusion sections can be missed in this study.    CTA head 10/9/19  1.  CT angiogram of the [place] within normal limits.    CTA neck 10/9/19  1.  CT angiogram of the neck within normal limits.  2.  Soft tissue prominence in the right superior hilum, could represent enlarged lymph node. Mild mediastinal adenopathy is seen. Recommend nonemergent follow-up contrast-enhanced CT chest for further evaluation.    MRI brain 10/9/19  1. Age-related cerebral atrophy.  2. Mild periventricular white matter changes consistent with chronic microvascular ischemic gliosis.    Medical Decision Making:  Today's Assessment / Status / Plan:     1. Other hyperlipidemia     2. Essential hypertension     3. Vitamin D insufficiency     4. Statin intolerance      lipitor, crestor, pravastatin    5. Iron deficiency anemia, unspecified iron deficiency anemia type  CBC WITHOUT DIFFERENTIAL    FERRITIN    IRON/TOTAL IRON BIND (FEIBC)    RETICULOCYTES COUNT     Patient Type: Primary Prevention    Etiology of Established CVD if Present: n/a    Lipid Management: Qualifies for Statin Therapy Based on 2013 ACC/AHA Guidelines: yes  Calculated 10-Year Risk of ASCVD: 4.0%  Currently on Statin: Yes  TSH normal.  Multiple statin intolerance in past including Lipitor, Crestor and Prava including significant depression with Crestor.  Discussed option of trying Livalo but patient not willing to trial any other statins.  Potential for Zetia to decrease her LDL 20-30%, plus TLC could likely get her levels to goal.    Interestingly her lipid panel shows an increase of her LDL by about 25 pts despite starting Zetia.  She states the diarrhea just recently began to resolve.  I recommended she continue Zetia currently, which  she is willing to do since her body seems to have adjusted and can likely now get full absorption of drug.     NLA Risk Category:   Moderate: 2 major RFs, risk scoring >10%, other risk scoring (CAC, advanced lipid, hsCRP)  Tx threshold:  non-HDL-C >159, LDL-C >129  Tx goal:  non-HDL <130, LDL-C <100 (optional: apoB<90)   At goal:  yes  Tx plan:  - Intensify TLC  - Continue Zetia 10mg daily   - Continue Vit D3 4000 units daily   - Continue cholestoff   - additional options include welchol, pcsk9i - not currently indicated  - repeat labs in 6-12mo    Blood Pressure Management:Goal: ACC/AHA (2017) goal <130/80  Home BP at goal:  yes  Office BP at goal:  yes  Echo: no LVH  ACR: normal  Device candidate? no  Plan:   Monitoring:   - start/continue home BP monitoring, reviewed correct technique:  Yes   - order 24h ABPM:  UNDECIDED, if white coat effect   - monitor lytes/gfr routinely   Medications:  ACEi/ARB: continue lisinopril 20mg daily   DHP-CCB: none  Thiazide: none  Other:   - consider peripheral alpha blocker or beta-blocker if paroxysmal HTN considered     Glycemic Status: Normal    Anti-Platelet/Anti-Coagulant Tx: Not in qualifying group based on recent literature    Smoking: continued complete avoidance of all tobacco products     Physical Activity: continue healthy activity to improve CV fitness, see care instructions for additional details     Weight Management and Nutrition: Dietary plan was discussed with patient at this visit including DASH, low sodium and/or as outlined in care instructions     Other:  1) transiet global amnesia, all imaging and labs stable, ? stress-related  - recommend ongoing care with PCP and consider neurology evaluation    2) stress/anxiety, using coping skills   - continue stress mgmt, 4-7-8 breathing exercises   - pending with counseling, declines meds     3) ? ASD vs PFO based upon echo.  No further w/u at this time   - consider repeat echo in the future     4) anemia, appears  microcytic.    - update labs, consider further eval if abnl and start iron     Instructed to follow-up with PCP for remainder of adult medical needs: yes  We will partner with other providers in the management of established vascular disease and cardiometabolic risk factors.    Studies to Be Obtained:  None   Labs to Be Obtained: anemia labs     Follow up in:  6mo with joe Neal M.D.

## 2020-01-10 ENCOUNTER — APPOINTMENT (RX ONLY)
Dept: URBAN - METROPOLITAN AREA CLINIC 4 | Facility: CLINIC | Age: 60
Setting detail: DERMATOLOGY
End: 2020-01-10

## 2020-01-10 DIAGNOSIS — D17 BENIGN LIPOMATOUS NEOPLASM: ICD-10-CM

## 2020-01-10 DIAGNOSIS — L71.0 PERIORAL DERMATITIS: ICD-10-CM

## 2020-01-10 PROBLEM — D17.1 BENIGN LIPOMATOUS NEOPLASM OF SKIN AND SUBCUTANEOUS TISSUE OF TRUNK: Status: ACTIVE | Noted: 2020-01-10

## 2020-01-10 PROCEDURE — ? PRESCRIPTION

## 2020-01-10 PROCEDURE — ? ADDITIONAL NOTES

## 2020-01-10 PROCEDURE — ? COUNSELING

## 2020-01-10 PROCEDURE — 99202 OFFICE O/P NEW SF 15 MIN: CPT

## 2020-01-10 PROCEDURE — ? OBSERVATION AND MEASURE

## 2020-01-10 ASSESSMENT — LOCATION ZONE DERM
LOCATION ZONE: LIP
LOCATION ZONE: TRUNK
LOCATION ZONE: FACE

## 2020-01-10 ASSESSMENT — LOCATION SIMPLE DESCRIPTION DERM
LOCATION SIMPLE: LEFT UPPER BACK
LOCATION SIMPLE: LEFT CHEEK
LOCATION SIMPLE: RIGHT LIP

## 2020-01-10 ASSESSMENT — LOCATION DETAILED DESCRIPTION DERM
LOCATION DETAILED: LEFT MEDIAL UPPER BACK
LOCATION DETAILED: RIGHT NASOLABIAL FOLD
LOCATION DETAILED: LEFT INFERIOR MEDIAL MALAR CHEEK

## 2020-01-10 NOTE — PROCEDURE: ADDITIONAL NOTES
Detail Level: Simple
Additional Notes: Recommended cerave and other gentle cleansers, stop other products\\nStop hydrocortisone.
Additional Notes: Painful \\nPA will be submitted to insurance

## 2020-01-10 NOTE — HPI: SKIN LESION
Is This A New Presentation, Or A Follow-Up?: Growth
How Severe Is Your Skin Lesion?: mild
Has Your Skin Lesion Been Treated?: not been treated
Which Family Member (Optional)?: Mother and sister

## 2020-01-21 ENCOUNTER — RX ONLY (OUTPATIENT)
Age: 60
Setting detail: RX ONLY
End: 2020-01-21

## 2020-01-21 RX ORDER — METRONIDAZOLE 10 MG/G
GEL TOPICAL
Qty: 1 | Refills: 3 | Status: ERX | COMMUNITY
Start: 2020-01-21

## 2020-01-27 ENCOUNTER — HOSPITAL ENCOUNTER (OUTPATIENT)
Dept: LAB | Facility: MEDICAL CENTER | Age: 60
End: 2020-01-27
Attending: INTERNAL MEDICINE
Payer: COMMERCIAL

## 2020-01-27 ENCOUNTER — HOSPITAL ENCOUNTER (OUTPATIENT)
Dept: LAB | Facility: MEDICAL CENTER | Age: 60
End: 2020-01-27
Attending: FAMILY MEDICINE
Payer: COMMERCIAL

## 2020-01-27 DIAGNOSIS — E55.9 VITAMIN D INSUFFICIENCY: ICD-10-CM

## 2020-01-27 DIAGNOSIS — D50.9 IRON DEFICIENCY ANEMIA, UNSPECIFIED IRON DEFICIENCY ANEMIA TYPE: ICD-10-CM

## 2020-01-27 DIAGNOSIS — I10 ESSENTIAL HYPERTENSION: ICD-10-CM

## 2020-01-27 LAB
25(OH)D3 SERPL-MCNC: 43 NG/ML (ref 30–100)
BASOPHILS # BLD AUTO: 0.6 % (ref 0–1.8)
BASOPHILS # BLD: 0.03 K/UL (ref 0–0.12)
EOSINOPHIL # BLD AUTO: 0.05 K/UL (ref 0–0.51)
EOSINOPHIL NFR BLD: 1 % (ref 0–6.9)
ERYTHROCYTE [DISTWIDTH] IN BLOOD BY AUTOMATED COUNT: 41.1 FL (ref 35.9–50)
ERYTHROCYTE [DISTWIDTH] IN BLOOD BY AUTOMATED COUNT: 41.9 FL (ref 35.9–50)
FERRITIN SERPL-MCNC: 262.8 NG/ML (ref 10–291)
HCT VFR BLD AUTO: 41.4 % (ref 37–47)
HCT VFR BLD AUTO: 41.4 % (ref 37–47)
HGB BLD-MCNC: 13.8 G/DL (ref 12–16)
HGB BLD-MCNC: 13.8 G/DL (ref 12–16)
HGB RETIC QN AUTO: 32.2 PG/CELL (ref 29–35)
IMM GRANULOCYTES # BLD AUTO: 0.01 K/UL (ref 0–0.11)
IMM GRANULOCYTES NFR BLD AUTO: 0.2 % (ref 0–0.9)
IMM RETICS NFR: 9.1 % (ref 9.3–17.4)
IRON SATN MFR SERPL: 22 % (ref 15–55)
IRON SERPL-MCNC: 81 UG/DL (ref 40–170)
LYMPHOCYTES # BLD AUTO: 2.59 K/UL (ref 1–4.8)
LYMPHOCYTES NFR BLD: 51.6 % (ref 22–41)
MCH RBC QN AUTO: 29.9 PG (ref 27–33)
MCH RBC QN AUTO: 30 PG (ref 27–33)
MCHC RBC AUTO-ENTMCNC: 33.3 G/DL (ref 33.6–35)
MCHC RBC AUTO-ENTMCNC: 33.3 G/DL (ref 33.6–35)
MCV RBC AUTO: 89.6 FL (ref 81.4–97.8)
MCV RBC AUTO: 90 FL (ref 81.4–97.8)
MONOCYTES # BLD AUTO: 0.4 K/UL (ref 0–0.85)
MONOCYTES NFR BLD AUTO: 8 % (ref 0–13.4)
NEUTROPHILS # BLD AUTO: 1.94 K/UL (ref 2–7.15)
NEUTROPHILS NFR BLD: 38.6 % (ref 44–72)
NRBC # BLD AUTO: 0 K/UL
NRBC BLD-RTO: 0 /100 WBC
PLATELET # BLD AUTO: 201 K/UL (ref 164–446)
PLATELET # BLD AUTO: 205 K/UL (ref 164–446)
PMV BLD AUTO: 11 FL (ref 9–12.9)
PMV BLD AUTO: 11.1 FL (ref 9–12.9)
RBC # BLD AUTO: 4.6 M/UL (ref 4.2–5.4)
RBC # BLD AUTO: 4.62 M/UL (ref 4.2–5.4)
RETICS # AUTO: 0.06 M/UL (ref 0.04–0.06)
RETICS/RBC NFR: 1.3 % (ref 0.8–2.1)
TIBC SERPL-MCNC: 371 UG/DL (ref 250–450)
WBC # BLD AUTO: 5 K/UL (ref 4.8–10.8)
WBC # BLD AUTO: 5.4 K/UL (ref 4.8–10.8)

## 2020-01-27 PROCEDURE — 83540 ASSAY OF IRON: CPT

## 2020-01-27 PROCEDURE — 85027 COMPLETE CBC AUTOMATED: CPT

## 2020-01-27 PROCEDURE — 85046 RETICYTE/HGB CONCENTRATE: CPT

## 2020-01-27 PROCEDURE — 83550 IRON BINDING TEST: CPT

## 2020-01-27 PROCEDURE — 82306 VITAMIN D 25 HYDROXY: CPT

## 2020-01-27 PROCEDURE — 85025 COMPLETE CBC W/AUTO DIFF WBC: CPT

## 2020-01-27 PROCEDURE — 36415 COLL VENOUS BLD VENIPUNCTURE: CPT

## 2020-01-27 PROCEDURE — 82728 ASSAY OF FERRITIN: CPT

## 2020-01-30 ENCOUNTER — OFFICE VISIT (OUTPATIENT)
Dept: MEDICAL GROUP | Facility: MEDICAL CENTER | Age: 60
End: 2020-01-30
Payer: COMMERCIAL

## 2020-01-30 VITALS
SYSTOLIC BLOOD PRESSURE: 108 MMHG | RESPIRATION RATE: 16 BRPM | DIASTOLIC BLOOD PRESSURE: 62 MMHG | HEIGHT: 63 IN | BODY MASS INDEX: 23.07 KG/M2 | HEART RATE: 73 BPM | WEIGHT: 130.2 LBS | OXYGEN SATURATION: 98 % | TEMPERATURE: 98.9 F

## 2020-01-30 DIAGNOSIS — R59.0 LOCALIZED ENLARGED LYMPH NODES: ICD-10-CM

## 2020-01-30 DIAGNOSIS — H93.8X2 PRESSURE SENSATION IN LEFT EAR: ICD-10-CM

## 2020-01-30 DIAGNOSIS — G45.4 TRANSIENT GLOBAL AMNESIA: ICD-10-CM

## 2020-01-30 DIAGNOSIS — R00.2 PALPITATIONS: ICD-10-CM

## 2020-01-30 DIAGNOSIS — R29.818 SUSPECTED SLEEP APNEA: ICD-10-CM

## 2020-01-30 DIAGNOSIS — R42 DIZZINESS: ICD-10-CM

## 2020-01-30 DIAGNOSIS — M54.9 BACK PAIN WITHOUT RADIATION: ICD-10-CM

## 2020-01-30 DIAGNOSIS — K21.9 GASTROESOPHAGEAL REFLUX DISEASE WITHOUT ESOPHAGITIS: ICD-10-CM

## 2020-01-30 DIAGNOSIS — R59.0 MEDIASTINAL ADENOPATHY: ICD-10-CM

## 2020-01-30 PROCEDURE — 99214 OFFICE O/P EST MOD 30 MIN: CPT | Performed by: INTERNAL MEDICINE

## 2020-01-30 ASSESSMENT — PATIENT HEALTH QUESTIONNAIRE - PHQ9: CLINICAL INTERPRETATION OF PHQ2 SCORE: 0

## 2020-01-30 NOTE — ASSESSMENT & PLAN NOTE
EGD on 10/16/14 showed non bleeding esophageal ulcer. Biopsy ruled out eosinophilic esophagitis, Dewitt's esophagus, celiac disease and H pylori.  Follow with digestive health.  GERD controlled with diet modification. Last EGD approx 2018 showed no ulcer and no f/u egd needed per pt.

## 2020-01-30 NOTE — PROGRESS NOTES
"CC:  Diagnoses of Dizziness, Palpitations, Transient global amnesia, Suspected sleep apnea, Localized enlarged lymph nodes, Mediastinal adenopathy, Pressure sensation in left ear, left temporal area and left TM joint, Gastroesophageal reflux disease without esophagitis, and Back pain without radiation were pertinent to this visit.    HISTORY OF THE PRESENT ILLNESS: Patient is a 59 y.o. female. This pleasant patient is here today to establish care.    Patient had recent hospitalization for transient global amnesia, brain imaging did not show any infarct or cause.  She was under a lot of stress.  No current focal weakness or paresthesias.  Though she says that she does frequently and intermittently have new sensation of entire body feeling weak, sensation like she is \"treading water \"generalized severe exhaustion after 9 hours of sleep.  Symptoms seem to be worse even with very small glass of wine.  Symptoms of generalized dizziness without vertigo.  She continues to be under a lot of stress with her mother living with her and working as a dispatcher in the [employer].  She is in therapy currently and trying to do daily meditation.    Palpitations are occurring nearly nightly generally lasting a few seconds.  No associated chest pain, dyspnea, nausea or other symptoms.  Sleep notes were reviewed from Dr. English from 5/10/2019 recommending sleep apnea studies.  She was confused she thought she did not need a sleep study, she is willing to proceed as this may be a factor in her palpitations and exhaustion after good sleep.    Imaging while she was admitted in the hospital did show mediastinal adenopathy on 10/9/2019 specifically prominence of the right superior hilum which could represent enlarged lymph node and mild mediastinal adenopathy.  Patient has no night sweats though she says she has chronically had hot flashes from menopause for over 20 years.  She says that she has " unintentionally lost 20 pounds over the past 6 months.  She is agreeable to repeat imaging to see if there has been any change.    Chronic mid back pain, she denies any focal weakness or paresthesias, no loss of bowel/bladder, no groin numbness.  She says she has a lipoma in this region that her dermatologist is planning to remove.  She does not feel she wants any physical therapy this time.  She is requesting x-ray.    With the history of pressure sensation left ear and left temporal area she says that this seems to resolve when she wears her nightly mouthguard from her dentist.  This is not an active issue for patient.    Noted recent labs from 1/27/2020 show resolution of anemia, vitamin D at 43.     Gastroesophageal reflux disease without esophagitis  EGD on 10/16/14 showed non bleeding esophageal ulcer. Biopsy ruled out eosinophilic esophagitis, Dewitt's esophagus, celiac disease and H pylori.  Follow with digestive health.  GERD controlled with diet modification. Last EGD approx 2018 showed no ulcer and no f/u egd needed per pt.     Allergies: Ciprofloxacin; Hydrocodone; Sulfa drugs; Latex; Other environmental; and Statins [hmg-coa-r inhibitors]    Current Outpatient Medications Ordered in Epic   Medication Sig Dispense Refill   • ezetimibe (ZETIA) 10 MG Tab Take 10 mg by mouth every morning.     • fluticasone (FLONASE) 50 MCG/ACT nasal spray Spray 2 Sprays in nose every morning as needed (ALLERGY).     • lisinopril (PRINIVIL) 20 MG Tab Take 20 mg by mouth every morning.     • scopolamine (TRANSDERM-SCOP) 1 mg/72hr PATCH 72 HR Apply 1 Patch to skin as directed every 3 days as needed (NAUSEA).     • NON SPECIFIED 1 Each by Injection route Q30 DAYS. SEASONAL SHOT     • Plant Sterols and Stanols (CHOLESTOFF PO) Take 1 Tab by mouth 2 Times a Day.     • coenzyme Q-10 30 MG capsule Take 30 mg by mouth every morning.     • Multiple Vitamins-Minerals (WOMENS 50+ MULTI VITAMIN/MIN PO) Take 1 Tab by mouth every  morning.     • albuterol 108 (90 Base) MCG/ACT Aero Soln inhalation aerosol Inhale 2 Puffs by mouth every 6 hours as needed for Shortness of Breath.     • Probiotic Product (PROBIOTIC COLON SUPPORT PO) Take 1 Cap by mouth every morning.     • Cholecalciferol (VITAMIN D3) 2000 UNIT Cap Take 4,000 Units by mouth every day.       No current [facility]-ordered facility-administered medications on file.        Past Medical History:   Diagnosis Date   • Abdominal pain    • Allergic rhinitis    • Anesthesia     ponv   • Back pain    • Blood in urine    • Chest tightness    • Constipation    • Cough    • Daytime sleepiness    • Diarrhea    • Earache    • Eye drainage    • Fatigue    • Frequent headaches    • GERD (gastroesophageal reflux disease)    • Heartburn    • High cholesterol    • Hoarseness, persistent    • Hypertension    • Indigestion    • Irregular periods    • Morning headache    • MRSA (methicillin resistant staph aureus) culture positive 2010    from spider bite- no open wounds as of 2018   • Nasal drainage    • Painful joint    • Painful urination    • Palpitations    • Restless leg syndrome    • Rhinitis    • Ringing in ears    • Sore muscles    • Swelling of lower extremity    • Unspecified disorder of thyroid    • Unspecified urinary incontinence    • Wears glasses        Past Surgical History:   Procedure Laterality Date   • TRIGGER FINGER RELEASE Right 11/20/2018    Procedure: TRIGGER FINGER RELEASE - THUMB;  Surgeon: Edgar Leblanc M.D.;  Location: SURGERY [facility] ER;  Service: Orthopedics   • JOINT INJECTION DIAGNOSTIC Bilateral 11/20/2018    Procedure: JOINT INJECTION DIAGNOSTIC - [facility] JOINTS;  Surgeon: Edgar Leblanc M.D.;  Location: [facility];  Service: Orthopedics   • CARPAL TUNNEL ENDOSCOPIC  9/30/2014    Both side Performed by Edgar Leblanc M.D. at [facility]   • ABDOMINAL HYSTERECTOMY TOTAL      Hysterectomy and removal Ovaries and tubes, Total Abdominal in 2000    • BONE GRAFT      bone graft surgery on left second toe in  due to fracture   • CARPAL TUNNEL RELEASE     • CYSTOSCOPY      Removal of polyps from urinary bladder and dilation of urethra in    • HYSTERECTOMY LAPAROSCOPY     • INCISION AND DRAINAGE ENT      I&D of left ear for ear infection twice in  and  by Dr Kahn, ENT   • NASAL SEPTAL RECONSTRUCTION             Social History     Tobacco Use   • Smoking status: Former Smoker     Packs/day: 0.00     Last attempt to quit: 1976     Years since quittin.0   • Smokeless tobacco: Never Used   Substance Use Topics   • Alcohol use: Yes     Alcohol/week: 3.6 oz     Types: 6 Glasses of wine per week     Frequency: 4 or more times a week     Drinks per session: 1 or 2     Binge frequency: Never   • Drug use: No       Social History     Patient does not qualify to have social determinant information on file (likely too young).   Social History Narrative   • Not on file       Family History   Problem Relation Age of Onset   • Cancer Paternal Grandmother    • Other Paternal Grandmother         lupus   • Heart Disease Paternal Grandmother          70s   • Cancer Paternal Aunt    • Other Mother         Parkinson disease   • Bipolar disorder Mother    • Hypertension Mother    • Thyroid Mother         Hypothyroid   • Heart Disease Father         Congenital heart disease, CHF   • Hypertension Father    • Diabetes Father    • Stroke Sister         age 53, smoker   • Bipolar disorder Sister    • Other Sister         alcoholic        ROS:     - Constitutional: See HPI  - Eyes: No acute vision loss    - ENT:  Negative for sore throat     - Respiratory: Negative for cough, sputum production, chest congestion, dyspnea, wheezing, and crackles.      - Cardiovascular: Negative for chest pain, palpitations, orthopnea, and bilateral lower extremity edema.     - Gastrointestinal: Negative for  nausea, vomiting, abdominal pain, hematochezia, melena, diarrhea,  "constipation, and greasy/foul-smelling stools.     - Genitourinary: Negative for dysuria    - Musculoskeletal: See HPI.     - Skin: Negative for rash, itching, cyanotic skin color change.     - Neurological: See HPI    - Endo:Negative for polyuria, heat/cold intolerance, excessive thirst    - Hem/lymphatic: Negative for easy bruising, blood clots, lymphedema, swollen glands    -Allergic/immun: Negative for allergic rhinitis    - Psychiatric/Behavioral: Negative for depression, suicidal/homicidal ideation and memory loss.      Exam: /62 (BP Location: Left arm, Patient Position: Sitting, BP Cuff Size: Adult)   Pulse 73   Temp 37.2 °C (98.9 °F) (Temporal)   Resp 16   Ht 1.6 m (5' 3\")   Wt 59.1 kg (130 lb 3.2 oz)   SpO2 98%  Body mass index is 23.06 kg/m².    General: Normal appearing. No distress.  EYES: Conjunctiva clear lids without ptosis, pupils equal  EARS: Normal shape and contour   NOSE, THROAT: nasal mucosa benign. oropharynx is without erythema, edema or exudates.   Neck: Supple without LAD. Thyroid is not enlarged.  Pulmonary: Clear to ausculation.  Normal effort. No rales or wheezing.  Cardiovascular: Regular rate and rhythm without significant murmur.   Abdomen: Soft, nontender, nondistended. Normal bowel sounds.  Neurologic: Cranial nerves grossly nonfocal  Lymph: No cervical, supraclavicular nodes palpable  Skin: Warm and dry.  No obvious lesions.  Musculoskeletal: Normal gait. No extremity cyanosis, clubbing, or edema.  Psych: Normal mood and affect. Alert and oriented x3. Judgment and insight is normal.      Assessment/Plan  1. Dizziness  Unclear if her symptoms of dizziness, intermittent extreme fatigue despite good sleep and sensation to treading water with her history of recent transient global nature could be an underlying neurologic disease patient would like to see neurology.  Could consider multiple sclerosis?  - REFERRAL TO NEUROLOGY    2. Palpitations  Advised patient she should " have her sleep study done for suspicion of sleep apnea.  Palpitations are daily, we will obtain monitor to define the rhythm.  - [facility] Zio Patch Monitor; Future    3. Transient global amnesia  Brain imaging without signs of stroke, she was under a lot of stress, ultimately diagnosed with TGA during recent mission.  Currently in therapy for stress.  - REFERRAL TO NEUROLOGY    4. Suspected sleep apnea  Follow-up pulmonary medicine, will send a note to her specialist to see if she needs to return to clinic or sleep study can be arranged now.    5. Localized enlarged lymph nodes  Follow-up CT scan for interval change.  - CT-CHEST (THORAX) WITH; Future    6. Mediastinal adenopathy  See #5 above  - CT-CHEST (THORAX) WITH; Future    7. Pressure sensation in left ear, left temporal area and left TM joint  Resolved with dental guard, chronic, stable issue.    8. Gastroesophageal reflux disease without esophagitis  Stable, controlled with diet modification no dysphasia or other GI symptoms.    9. Back pain without radiation  Chronic, stable condition without any focal neurologic symptoms.  Patient declined physical therapy reasonable to obtain x-ray per her request.  - DX-THORACIC SPINE-2 VIEWS; Future        Return to clinic 3 months or sooner if needed      Please note that this dictation was created using voice recognition software. I have made every reasonable attempt to correct obvious errors, but I expect that there are errors of grammar and possibly content that I did not discover before finalizing the note.

## 2020-02-06 ENCOUNTER — TELEPHONE (OUTPATIENT)
Dept: VASCULAR LAB | Facility: MEDICAL CENTER | Age: 60
End: 2020-02-06

## 2020-02-25 ENCOUNTER — APPOINTMENT (RX ONLY)
Dept: URBAN - METROPOLITAN AREA CLINIC 4 | Facility: CLINIC | Age: 60
Setting detail: DERMATOLOGY
End: 2020-02-25

## 2020-02-25 DIAGNOSIS — D17 BENIGN LIPOMATOUS NEOPLASM: ICD-10-CM

## 2020-02-25 PROBLEM — D17.1 BENIGN LIPOMATOUS NEOPLASM OF SKIN AND SUBCUTANEOUS TISSUE OF TRUNK: Status: ACTIVE | Noted: 2020-02-25

## 2020-02-25 PROCEDURE — 11406 EXC TR-EXT B9+MARG >4.0 CM: CPT

## 2020-02-25 PROCEDURE — ? COUNSELING

## 2020-02-25 PROCEDURE — ? EXCISION

## 2020-02-25 PROCEDURE — 12032 INTMD RPR S/A/T/EXT 2.6-7.5: CPT

## 2020-02-25 ASSESSMENT — LOCATION SIMPLE DESCRIPTION DERM: LOCATION SIMPLE: LEFT UPPER BACK

## 2020-02-25 ASSESSMENT — LOCATION ZONE DERM: LOCATION ZONE: TRUNK

## 2020-02-25 ASSESSMENT — LOCATION DETAILED DESCRIPTION DERM: LOCATION DETAILED: LEFT MEDIAL UPPER BACK

## 2020-02-25 NOTE — PROCEDURE: EXCISION
Medical Necessity Information: It is in your best interest to select a reason for this procedure from the list below. All of these items fulfill various CMS LCD requirements except lesion extends to a margin.
Include Z78.9 (Other Specified Conditions Influencing Health Status) As An Associated Diagnosis?: No
Medical Necessity Clause: This procedure was medically necessary because the lesion that was treated was:
Lab: [id number]
Lab Facility: 
Size Of Lesion In Cm: 6
X Size Of Lesion In Cm (Optional): 4
Size Of Margin In Cm: 0
Excision Method: Slit
Repair Type: Intermediate
Intermediate / Complex Repair - Final Wound Length In Cm: 2.8
Undermining Type: Entire Wound
Debridement Text: The wound edges were debrided prior to proceeding with the closure to facilitate wound healing.
Helical Rim Text: The closure involved the helical rim.
Vermilion Border Text: The closure involved the vermilion border.
Nostril Rim Text: The closure involved the nostril rim.
Retention Suture Text: Retention sutures were placed to support the closure and prevent dehiscence.
Suture Removal: 14 days
Epidermal Closure Graft Donor Site (Optional): simple interrupted
Graft Donor Site Bandage (Optional-Leave Blank If You Don't Want In Note): Steri-strips and a pressure bandage were applied to the donor site.
Detail Level: Detailed
Excision Depth: adipose tissue
Scalpel Size: 15 blade
Anesthesia Type: 1% lidocaine with epinephrine
Hemostasis: Electrocautery
Estimated Blood Loss (Cc): minimal
Deep Sutures: 3-0 Vicryl
Epidermal Sutures: 4-0 Ethilon
Epidermal Closure: running
Wound Care: Petrolatum
Dressing: steri-strips and pressure dressing
Complex Repair Preamble Text (Leave Blank If You Do Not Want): Extensive wide undermining was performed.
Intermediate Repair Preamble Text (Leave Blank If You Do Not Want): Undermining was performed with blunt dissection.
Fusiform Excision Additional Text (Leave Blank If You Do Not Want): The margin was drawn around the clinically apparent lesion.  A fusiform shape was then drawn on the skin incorporating the lesion and margins.  Incisions were then made along these lines to the appropriate tissue plane and the lesion was extirpated.
Eliptical Excision Additional Text (Leave Blank If You Do Not Want): The margin was drawn around the clinically apparent lesion.  An elliptical shape was then drawn on the skin incorporating the lesion and margins.  Incisions were then made along these lines to the appropriate tissue plane and the lesion was extirpated.
Saucerization Excision Additional Text (Leave Blank If You Do Not Want): The margin was drawn around the clinically apparent lesion.  Incisions were then made along these lines, in a tangential fashion, to the appropriate tissue plane and the lesion was extirpated.
Slit Excision Additional Text (Leave Blank If You Do Not Want): A linear line was drawn on the skin overlying the lesion. An incision was made slowly until the lesion was visualized.  Once visualized, the lesion was removed with blunt dissection.
Excisional Biopsy Additional Text (Leave Blank If You Do Not Want): The margin was drawn around the clinically apparent lesion. An elliptical shape was then drawn on the skin incorporating the lesion and margins.  Incisions were then made along these lines to the appropriate tissue plane and the lesion was extirpated.
Perilesional Excision Additional Text (Leave Blank If You Do Not Want): The margin was drawn around the clinically apparent lesion. Incisions were then made along these lines to the appropriate tissue plane and the lesion was extirpated.
Repair Performed By Another Provider Text (Leave Blank If You Do Not Want): After the tissue was excised the defect was repaired by another provider.
No Repair - Repaired With Adjacent Surgical Defect Text (Leave Blank If You Do Not Want): After the excision the defect was repaired concurrently with another surgical defect which was in close approximation.
Advancement Flap (Single) Text: The defect edges were debeveled with a #15 scalpel blade.  Given the location of the defect and the proximity to free margins a single advancement flap was deemed most appropriate.  Using a sterile surgical marker, an appropriate advancement flap was drawn incorporating the defect and placing the expected incisions within the relaxed skin tension lines where possible.    The area thus outlined was incised deep to adipose tissue with a #15 scalpel blade.  The skin margins were undermined to an appropriate distance in all directions utilizing iris scissors.
Advancement Flap (Double) Text: The defect edges were debeveled with a #15 scalpel blade.  Given the location of the defect and the proximity to free margins a double advancement flap was deemed most appropriate.  Using a sterile surgical marker, the appropriate advancement flaps were drawn incorporating the defect and placing the expected incisions within the relaxed skin tension lines where possible.    The area thus outlined was incised deep to adipose tissue with a #15 scalpel blade.  The skin margins were undermined to an appropriate distance in all directions utilizing iris scissors.
Burow's Advancement Flap Text: The defect edges were debeveled with a #15 scalpel blade.  Given the location of the defect and the proximity to free margins a Burow's advancement flap was deemed most appropriate.  Using a sterile surgical marker, the appropriate advancement flap was drawn incorporating the defect and placing the expected incisions within the relaxed skin tension lines where possible.    The area thus outlined was incised deep to adipose tissue with a #15 scalpel blade.  The skin margins were undermined to an appropriate distance in all directions utilizing iris scissors.
Chonodrocutaneous Helical Advancement Flap Text: The defect edges were debeveled with a #15 scalpel blade.  Given the location of the defect and the proximity to free margins a chondrocutaneous helical advancement flap was deemed most appropriate.  Using a sterile surgical marker, the appropriate advancement flap was drawn incorporating the defect and placing the expected incisions within the relaxed skin tension lines where possible.    The area thus outlined was incised deep to adipose tissue with a #15 scalpel blade.  The skin margins were undermined to an appropriate distance in all directions utilizing iris scissors.
Crescentic Advancement Flap Text: The defect edges were debeveled with a #15 scalpel blade.  Given the location of the defect and the proximity to free margins a crescentic advancement flap was deemed most appropriate.  Using a sterile surgical marker, the appropriate advancement flap was drawn incorporating the defect and placing the expected incisions within the relaxed skin tension lines where possible.    The area thus outlined was incised deep to adipose tissue with a #15 scalpel blade.  The skin margins were undermined to an appropriate distance in all directions utilizing iris scissors.
A-T Advancement Flap Text: The defect edges were debeveled with a #15 scalpel blade.  Given the location of the defect, shape of the defect and the proximity to free margins an A-T advancement flap was deemed most appropriate.  Using a sterile surgical marker, an appropriate advancement flap was drawn incorporating the defect and placing the expected incisions within the relaxed skin tension lines where possible.    The area thus outlined was incised deep to adipose tissue with a #15 scalpel blade.  The skin margins were undermined to an appropriate distance in all directions utilizing iris scissors.
O-T Advancement Flap Text: The defect edges were debeveled with a #15 scalpel blade.  Given the location of the defect, shape of the defect and the proximity to free margins an O-T advancement flap was deemed most appropriate.  Using a sterile surgical marker, an appropriate advancement flap was drawn incorporating the defect and placing the expected incisions within the relaxed skin tension lines where possible.    The area thus outlined was incised deep to adipose tissue with a #15 scalpel blade.  The skin margins were undermined to an appropriate distance in all directions utilizing iris scissors.
O-L Flap Text: The defect edges were debeveled with a #15 scalpel blade.  Given the location of the defect, shape of the defect and the proximity to free margins an O-L flap was deemed most appropriate.  Using a sterile surgical marker, an appropriate advancement flap was drawn incorporating the defect and placing the expected incisions within the relaxed skin tension lines where possible.    The area thus outlined was incised deep to adipose tissue with a #15 scalpel blade.  The skin margins were undermined to an appropriate distance in all directions utilizing iris scissors.
O-Z Flap Text: The defect edges were debeveled with a #15 scalpel blade.  Given the location of the defect, shape of the defect and the proximity to free margins an O-Z flap was deemed most appropriate.  Using a sterile surgical marker, an appropriate transposition flap was drawn incorporating the defect and placing the expected incisions within the relaxed skin tension lines where possible. The area thus outlined was incised deep to adipose tissue with a #15 scalpel blade.  The skin margins were undermined to an appropriate distance in all directions utilizing iris scissors.
Double O-Z Flap Text: The defect edges were debeveled with a #15 scalpel blade.  Given the location of the defect, shape of the defect and the proximity to free margins a Double O-Z flap was deemed most appropriate.  Using a sterile surgical marker, an appropriate transposition flap was drawn incorporating the defect and placing the expected incisions within the relaxed skin tension lines where possible. The area thus outlined was incised deep to adipose tissue with a #15 scalpel blade.  The skin margins were undermined to an appropriate distance in all directions utilizing iris scissors.
V-Y Flap Text: The defect edges were debeveled with a #15 scalpel blade.  Given the location of the defect, shape of the defect and the proximity to free margins a V-Y flap was deemed most appropriate.  Using a sterile surgical marker, an appropriate advancement flap was drawn incorporating the defect and placing the expected incisions within the relaxed skin tension lines where possible.    The area thus outlined was incised deep to adipose tissue with a #15 scalpel blade.  The skin margins were undermined to an appropriate distance in all directions utilizing iris scissors.
Mercedes Flap Text: The defect edges were debeveled with a #15 scalpel blade.  Given the location of the defect, shape of the defect and the proximity to free margins a Mercedes flap was deemed most appropriate.  Using a sterile surgical marker, an appropriate advancement flap was drawn incorporating the defect and placing the expected incisions within the relaxed skin tension lines where possible. The area thus outlined was incised deep to adipose tissue with a #15 scalpel blade.  The skin margins were undermined to an appropriate distance in all directions utilizing iris scissors.
Modified Advancement Flap Text: The defect edges were debeveled with a #15 scalpel blade.  Given the location of the defect, shape of the defect and the proximity to free margins a modified advancement flap was deemed most appropriate.  Using a sterile surgical marker, an appropriate advancement flap was drawn incorporating the defect and placing the expected incisions within the relaxed skin tension lines where possible.    The area thus outlined was incised deep to adipose tissue with a #15 scalpel blade.  The skin margins were undermined to an appropriate distance in all directions utilizing iris scissors.
Mucosal Advancement Flap Text: Given the location of the defect, shape of the defect and the proximity to free margins a mucosal advancement flap was deemed most appropriate. Incisions were made with a 15 blade scalpel in the appropriate fashion along the cutaneous vermillion border and the mucosal lip. The remaining actinically damaged mucosal tissue was excised.  The mucosal advancement flap was then elevated to the gingival sulcus with care taken to preserve the neurovascular structures and advanced into the primary defect. Care was taken to ensure that precise realignment of the vermilion border was achieved.
Hatchet Flap Text: The defect edges were debeveled with a #15 scalpel blade.  Given the location of the defect, shape of the defect and the proximity to free margins a hatchet flap was deemed most appropriate.  Using a sterile surgical marker, an appropriate hatchet flap was drawn incorporating the defect and placing the expected incisions within the relaxed skin tension lines where possible.    The area thus outlined was incised deep to adipose tissue with a #15 scalpel blade.  The skin margins were undermined to an appropriate distance in all directions utilizing iris scissors.
Rotation Flap Text: The defect edges were debeveled with a #15 scalpel blade.  Given the location of the defect, shape of the defect and the proximity to free margins a rotation flap was deemed most appropriate.  Using a sterile surgical marker, an appropriate rotation flap was drawn incorporating the defect and placing the expected incisions within the relaxed skin tension lines where possible.    The area thus outlined was incised deep to adipose tissue with a #15 scalpel blade.  The skin margins were undermined to an appropriate distance in all directions utilizing iris scissors.
Spiral Flap Text: The defect edges were debeveled with a #15 scalpel blade.  Given the location of the defect, shape of the defect and the proximity to free margins a spiral flap was deemed most appropriate.  Using a sterile surgical marker, an appropriate rotation flap was drawn incorporating the defect and placing the expected incisions within the relaxed skin tension lines where possible. The area thus outlined was incised deep to adipose tissue with a #15 scalpel blade.  The skin margins were undermined to an appropriate distance in all directions utilizing iris scissors.
Star Wedge Flap Text: The defect edges were debeveled with a #15 scalpel blade.  Given the location of the defect, shape of the defect and the proximity to free margins a star wedge flap was deemed most appropriate.  Using a sterile surgical marker, an appropriate rotation flap was drawn incorporating the defect and placing the expected incisions within the relaxed skin tension lines where possible. The area thus outlined was incised deep to adipose tissue with a #15 scalpel blade.  The skin margins were undermined to an appropriate distance in all directions utilizing iris scissors.
Transposition Flap Text: The defect edges were debeveled with a #15 scalpel blade.  Given the location of the defect and the proximity to free margins a transposition flap was deemed most appropriate.  Using a sterile surgical marker, an appropriate transposition flap was drawn incorporating the defect.    The area thus outlined was incised deep to adipose tissue with a #15 scalpel blade.  The skin margins were undermined to an appropriate distance in all directions utilizing iris scissors.
Muscle Hinge Flap Text: The defect edges were debeveled with a #15 scalpel blade.  Given the size, depth and location of the defect and the proximity to free margins a muscle hinge flap was deemed most appropriate.  Using a sterile surgical marker, an appropriate hinge flap was drawn incorporating the defect. The area thus outlined was incised with a #15 scalpel blade.  The skin margins were undermined to an appropriate distance in all directions utilizing iris scissors.
Melolabial Transposition Flap Text: The defect edges were debeveled with a #15 scalpel blade.  Given the location of the defect and the proximity to free margins a melolabial flap was deemed most appropriate.  Using a sterile surgical marker, an appropriate melolabial transposition flap was drawn incorporating the defect.    The area thus outlined was incised deep to adipose tissue with a #15 scalpel blade.  The skin margins were undermined to an appropriate distance in all directions utilizing iris scissors.
Rhombic Flap Text: The defect edges were debeveled with a #15 scalpel blade.  Given the location of the defect and the proximity to free margins a rhombic flap was deemed most appropriate.  Using a sterile surgical marker, an appropriate rhombic flap was drawn incorporating the defect.    The area thus outlined was incised deep to adipose tissue with a #15 scalpel blade.  The skin margins were undermined to an appropriate distance in all directions utilizing iris scissors.
Rhomboid Transposition Flap Text: The defect edges were debeveled with a #15 scalpel blade.  Given the location of the defect and the proximity to free margins a rhomboid transposition flap was deemed most appropriate.  Using a sterile surgical marker, an appropriate rhomboid flap was drawn incorporating the defect.    The area thus outlined was incised deep to adipose tissue with a #15 scalpel blade.  The skin margins were undermined to an appropriate distance in all directions utilizing iris scissors.
Bi-Rhombic Flap Text: The defect edges were debeveled with a #15 scalpel blade.  Given the location of the defect and the proximity to free margins a bi-rhombic flap was deemed most appropriate.  Using a sterile surgical marker, an appropriate rhombic flap was drawn incorporating the defect. The area thus outlined was incised deep to adipose tissue with a #15 scalpel blade.  The skin margins were undermined to an appropriate distance in all directions utilizing iris scissors.
Helical Rim Advancement Flap Text: The defect edges were debeveled with a #15 blade scalpel.  Given the location of the defect and the proximity to free margins (helical rim) a double helical rim advancement flap was deemed most appropriate.  Using a sterile surgical marker, the appropriate advancement flaps were drawn incorporating the defect and placing the expected incisions between the helical rim and antihelix where possible.  The area thus outlined was incised through and through with a #15 scalpel blade.  With a skin hook and iris scissors, the flaps were gently and sharply undermined and freed up.
Bilateral Helical Rim Advancement Flap Text: The defect edges were debeveled with a #15 blade scalpel.  Given the location of the defect and the proximity to free margins (helical rim) a bilateral helical rim advancement flap was deemed most appropriate.  Using a sterile surgical marker, the appropriate advancement flaps were drawn incorporating the defect and placing the expected incisions between the helical rim and antihelix where possible.  The area thus outlined was incised through and through with a #15 scalpel blade.  With a skin hook and iris scissors, the flaps were gently and sharply undermined and freed up.
Ear Star Wedge Flap Text: The defect edges were debeveled with a #15 blade scalpel.  Given the location of the defect and the proximity to free margins (helical rim) an ear star wedge flap was deemed most appropriate.  Using a sterile surgical marker, the appropriate flap was drawn incorporating the defect and placing the expected incisions between the helical rim and antihelix where possible.  The area thus outlined was incised through and through with a #15 scalpel blade.
Banner Transposition Flap Text: The defect edges were debeveled with a #15 scalpel blade.  Given the location of the defect and the proximity to free margins a Banner transposition flap was deemed most appropriate.  Using a sterile surgical marker, an appropriate flap drawn around the defect. The area thus outlined was incised deep to adipose tissue with a #15 scalpel blade.  The skin margins were undermined to an appropriate distance in all directions utilizing iris scissors.
Bilobed Flap Text: The defect edges were debeveled with a #15 scalpel blade.  Given the location of the defect and the proximity to free margins a bilobe flap was deemed most appropriate.  Using a sterile surgical marker, an appropriate bilobe flap drawn around the defect.    The area thus outlined was incised deep to adipose tissue with a #15 scalpel blade.  The skin margins were undermined to an appropriate distance in all directions utilizing iris scissors.
Bilobed Transposition Flap Text: The defect edges were debeveled with a #15 scalpel blade.  Given the location of the defect and the proximity to free margins a bilobed transposition flap was deemed most appropriate.  Using a sterile surgical marker, an appropriate bilobe flap drawn around the defect.    The area thus outlined was incised deep to adipose tissue with a #15 scalpel blade.  The skin margins were undermined to an appropriate distance in all directions utilizing iris scissors.
Trilobed Flap Text: The defect edges were debeveled with a #15 scalpel blade.  Given the location of the defect and the proximity to free margins a trilobed flap was deemed most appropriate.  Using a sterile surgical marker, an appropriate trilobed flap drawn around the defect.    The area thus outlined was incised deep to adipose tissue with a #15 scalpel blade.  The skin margins were undermined to an appropriate distance in all directions utilizing iris scissors.
Dorsal Nasal Flap Text: The defect edges were debeveled with a #15 scalpel blade.  Given the location of the defect and the proximity to free margins a dorsal nasal flap was deemed most appropriate.  Using a sterile surgical marker, an appropriate dorsal nasal flap was drawn around the defect.    The area thus outlined was incised deep to adipose tissue with a #15 scalpel blade.  The skin margins were undermined to an appropriate distance in all directions utilizing iris scissors.
Island Pedicle Flap Text: The defect edges were debeveled with a #15 scalpel blade.  Given the location of the defect, shape of the defect and the proximity to free margins an island pedicle advancement flap was deemed most appropriate.  Using a sterile surgical marker, an appropriate advancement flap was drawn incorporating the defect, outlining the appropriate donor tissue and placing the expected incisions within the relaxed skin tension lines where possible.    The area thus outlined was incised deep to adipose tissue with a #15 scalpel blade.  The skin margins were undermined to an appropriate distance in all directions around the primary defect and laterally outward around the island pedicle utilizing iris scissors.  There was minimal undermining beneath the pedicle flap.
Island Pedicle Flap With Canthal Suspension Text: The defect edges were debeveled with a #15 scalpel blade.  Given the location of the defect, shape of the defect and the proximity to free margins an island pedicle advancement flap was deemed most appropriate.  Using a sterile surgical marker, an appropriate advancement flap was drawn incorporating the defect, outlining the appropriate donor tissue and placing the expected incisions within the relaxed skin tension lines where possible. The area thus outlined was incised deep to adipose tissue with a #15 scalpel blade.  The skin margins were undermined to an appropriate distance in all directions around the primary defect and laterally outward around the island pedicle utilizing iris scissors.  There was minimal undermining beneath the pedicle flap. A suspension suture was placed in the canthal tendon to prevent tension and prevent ectropion.
Alar Island Pedicle Flap Text: The defect edges were debeveled with a #15 scalpel blade.  Given the location of the defect, shape of the defect and the proximity to the alar rim an island pedicle advancement flap was deemed most appropriate.  Using a sterile surgical marker, an appropriate advancement flap was drawn incorporating the defect, outlining the appropriate donor tissue and placing the expected incisions within the nasal ala running parallel to the alar rim. The area thus outlined was incised with a #15 scalpel blade.  The skin margins were undermined minimally to an appropriate distance in all directions around the primary defect and laterally outward around the island pedicle utilizing iris scissors.  There was minimal undermining beneath the pedicle flap.
Double Island Pedicle Flap Text: The defect edges were debeveled with a #15 scalpel blade.  Given the location of the defect, shape of the defect and the proximity to free margins a double island pedicle advancement flap was deemed most appropriate.  Using a sterile surgical marker, an appropriate advancement flap was drawn incorporating the defect, outlining the appropriate donor tissue and placing the expected incisions within the relaxed skin tension lines where possible.    The area thus outlined was incised deep to adipose tissue with a #15 scalpel blade.  The skin margins were undermined to an appropriate distance in all directions around the primary defect and laterally outward around the island pedicle utilizing iris scissors.  There was minimal undermining beneath the pedicle flap.
Island Pedicle Flap-Requiring Vessel Identification Text: The defect edges were debeveled with a #15 scalpel blade.  Given the location of the defect, shape of the defect and the proximity to free margins an island pedicle advancement flap was deemed most appropriate.  Using a sterile surgical marker, an appropriate advancement flap was drawn, based on the axial vessel mentioned above, incorporating the defect, outlining the appropriate donor tissue and placing the expected incisions within the relaxed skin tension lines where possible.    The area thus outlined was incised deep to adipose tissue with a #15 scalpel blade.  The skin margins were undermined to an appropriate distance in all directions around the primary defect and laterally outward around the island pedicle utilizing iris scissors.  There was minimal undermining beneath the pedicle flap.
Keystone Flap Text: The defect edges were debeveled with a #15 scalpel blade.  Given the location of the defect, shape of the defect a keystone flap was deemed most appropriate.  Using a sterile surgical marker, an appropriate keystone flap was drawn incorporating the defect, outlining the appropriate donor tissue and placing the expected incisions within the relaxed skin tension lines where possible. The area thus outlined was incised deep to adipose tissue with a #15 scalpel blade.  The skin margins were undermined to an appropriate distance in all directions around the primary defect and laterally outward around the flap utilizing iris scissors.
O-T Plasty Text: The defect edges were debeveled with a #15 scalpel blade.  Given the location of the defect, shape of the defect and the proximity to free margins an O-T plasty was deemed most appropriate.  Using a sterile surgical marker, an appropriate O-T plasty was drawn incorporating the defect and placing the expected incisions within the relaxed skin tension lines where possible.    The area thus outlined was incised deep to adipose tissue with a #15 scalpel blade.  The skin margins were undermined to an appropriate distance in all directions utilizing iris scissors.
O-Z Plasty Text: The defect edges were debeveled with a #15 scalpel blade.  Given the location of the defect, shape of the defect and the proximity to free margins an O-Z plasty (double transposition flap) was deemed most appropriate.  Using a sterile surgical marker, the appropriate transposition flaps were drawn incorporating the defect and placing the expected incisions within the relaxed skin tension lines where possible.    The area thus outlined was incised deep to adipose tissue with a #15 scalpel blade.  The skin margins were undermined to an appropriate distance in all directions utilizing iris scissors.  Hemostasis was achieved with electrocautery.  The flaps were then transposed into place, one clockwise and the other counterclockwise, and anchored with interrupted buried subcutaneous sutures.
Double O-Z Plasty Text: The defect edges were debeveled with a #15 scalpel blade.  Given the location of the defect, shape of the defect and the proximity to free margins a Double O-Z plasty (double transposition flap) was deemed most appropriate.  Using a sterile surgical marker, the appropriate transposition flaps were drawn incorporating the defect and placing the expected incisions within the relaxed skin tension lines where possible. The area thus outlined was incised deep to adipose tissue with a #15 scalpel blade.  The skin margins were undermined to an appropriate distance in all directions utilizing iris scissors.  Hemostasis was achieved with electrocautery.  The flaps were then transposed into place, one clockwise and the other counterclockwise, and anchored with interrupted buried subcutaneous sutures.
S Plasty Text: Given the location and shape of the defect, and the orientation of relaxed skin tension lines, an S-plasty was deemed most appropriate for repair.  Using a sterile surgical marker, the appropriate outline of the S-plasty was drawn, incorporating the defect and placing the expected incisions within the relaxed skin tension lines where possible.  The area thus outlined was incised deep to adipose tissue with a #15 scalpel blade.  The skin margins were undermined to an appropriate distance in all directions utilizing iris scissors. The skin flaps were advanced over the defect.  The opposing margins were then approximated with interrupted buried subcutaneous sutures.
V-Y Plasty Text: The defect edges were debeveled with a #15 scalpel blade.  Given the location of the defect, shape of the defect and the proximity to free margins an V-Y advancement flap was deemed most appropriate.  Using a sterile surgical marker, an appropriate advancement flap was drawn incorporating the defect and placing the expected incisions within the relaxed skin tension lines where possible.    The area thus outlined was incised deep to adipose tissue with a #15 scalpel blade.  The skin margins were undermined to an appropriate distance in all directions utilizing iris scissors.
H Plasty Text: Given the location of the defect, shape of the defect and the proximity to free margins a H-plasty was deemed most appropriate for repair.  Using a sterile surgical marker, the appropriate advancement arms of the H-plasty were drawn incorporating the defect and placing the expected incisions within the relaxed skin tension lines where possible. The area thus outlined was incised deep to adipose tissue with a #15 scalpel blade. The skin margins were undermined to an appropriate distance in all directions utilizing iris scissors.  The opposing advancement arms were then advanced into place in opposite direction and anchored with interrupted buried subcutaneous sutures.
W Plasty Text: The lesion was extirpated to the level of the fat with a #15 scalpel blade.  Given the location of the defect, shape of the defect and the proximity to free margins a W-plasty was deemed most appropriate for repair.  Using a sterile surgical marker, the appropriate transposition arms of the W-plasty were drawn incorporating the defect and placing the expected incisions within the relaxed skin tension lines where possible.    The area thus outlined was incised deep to adipose tissue with a #15 scalpel blade.  The skin margins were undermined to an appropriate distance in all directions utilizing iris scissors.  The opposing transposition arms were then transposed into place in opposite direction and anchored with interrupted buried subcutaneous sutures.
Z Plasty Text: The lesion was extirpated to the level of the fat with a #15 scalpel blade.  Given the location of the defect, shape of the defect and the proximity to free margins a Z-plasty was deemed most appropriate for repair.  Using a sterile surgical marker, the appropriate transposition arms of the Z-plasty were drawn incorporating the defect and placing the expected incisions within the relaxed skin tension lines where possible.    The area thus outlined was incised deep to adipose tissue with a #15 scalpel blade.  The skin margins were undermined to an appropriate distance in all directions utilizing iris scissors.  The opposing transposition arms were then transposed into place in opposite direction and anchored with interrupted buried subcutaneous sutures.
Cheek Interpolation Flap Text: A decision was made to reconstruct the defect utilizing an interpolation axial flap and a staged reconstruction.  A telfa template was made of the defect.  This telfa template was then used to outline the Cheek Interpolation flap.  The donor area for the pedicle flap was then injected with anesthesia.  The flap was excised through the skin and subcutaneous tissue down to the layer of the underlying musculature.  The interpolation flap was carefully excised within this deep plane to maintain its blood supply.  The edges of the donor site were undermined.   The donor site was closed in a primary fashion.  The pedicle was then rotated into position and sutured.  Once the tube was sutured into place, adequate blood supply was confirmed with blanching and refill.  The pedicle was then wrapped with xeroform gauze and dressed appropriately with a telfa and gauze bandage to ensure continued blood supply and protect the attached pedicle.
Cheek-To-Nose Interpolation Flap Text: A decision was made to reconstruct the defect utilizing an interpolation axial flap and a staged reconstruction.  A telfa template was made of the defect.  This telfa template was then used to outline the Cheek-To-Nose Interpolation flap.  The donor area for the pedicle flap was then injected with anesthesia.  The flap was excised through the skin and subcutaneous tissue down to the layer of the underlying musculature.  The interpolation flap was carefully excised within this deep plane to maintain its blood supply.  The edges of the donor site were undermined.   The donor site was closed in a primary fashion.  The pedicle was then rotated into position and sutured.  Once the tube was sutured into place, adequate blood supply was confirmed with blanching and refill.  The pedicle was then wrapped with xeroform gauze and dressed appropriately with a telfa and gauze bandage to ensure continued blood supply and protect the attached pedicle.
Interpolation Flap Text: A decision was made to reconstruct the defect utilizing an interpolation axial flap and a staged reconstruction.  A telfa template was made of the defect.  This telfa template was then used to outline the interpolation flap.  The donor area for the pedicle flap was then injected with anesthesia.  The flap was excised through the skin and subcutaneous tissue down to the layer of the underlying musculature.  The interpolation flap was carefully excised within this deep plane to maintain its blood supply.  The edges of the donor site were undermined.   The donor site was closed in a primary fashion.  The pedicle was then rotated into position and sutured.  Once the tube was sutured into place, adequate blood supply was confirmed with blanching and refill.  The pedicle was then wrapped with xeroform gauze and dressed appropriately with a telfa and gauze bandage to ensure continued blood supply and protect the attached pedicle.
Melolabial Interpolation Flap Text: A decision was made to reconstruct the defect utilizing an interpolation axial flap and a staged reconstruction.  A telfa template was made of the defect.  This telfa template was then used to outline the melolabial interpolation flap.  The donor area for the pedicle flap was then injected with anesthesia.  The flap was excised through the skin and subcutaneous tissue down to the layer of the underlying musculature.  The pedicle flap was carefully excised within this deep plane to maintain its blood supply.  The edges of the donor site were undermined.   The donor site was closed in a primary fashion.  The pedicle was then rotated into position and sutured.  Once the tube was sutured into place, adequate blood supply was confirmed with blanching and refill.  The pedicle was then wrapped with xeroform gauze and dressed appropriately with a telfa and gauze bandage to ensure continued blood supply and protect the attached pedicle.
Mastoid Interpolation Flap Text: A decision was made to reconstruct the defect utilizing an interpolation axial flap and a staged reconstruction.  A telfa template was made of the defect.  This telfa template was then used to outline the mastoid interpolation flap.  The donor area for the pedicle flap was then injected with anesthesia.  The flap was excised through the skin and subcutaneous tissue down to the layer of the underlying musculature.  The pedicle flap was carefully excised within this deep plane to maintain its blood supply.  The edges of the donor site were undermined.   The donor site was closed in a primary fashion.  The pedicle was then rotated into position and sutured.  Once the tube was sutured into place, adequate blood supply was confirmed with blanching and refill.  The pedicle was then wrapped with xeroform gauze and dressed appropriately with a telfa and gauze bandage to ensure continued blood supply and protect the attached pedicle.
Posterior Auricular Interpolation Flap Text: A decision was made to reconstruct the defect utilizing an interpolation axial flap and a staged reconstruction.  A telfa template was made of the defect.  This telfa template was then used to outline the posterior auricular interpolation flap.  The donor area for the pedicle flap was then injected with anesthesia.  The flap was excised through the skin and subcutaneous tissue down to the layer of the underlying musculature.  The pedicle flap was carefully excised within this deep plane to maintain its blood supply.  The edges of the donor site were undermined.   The donor site was closed in a primary fashion.  The pedicle was then rotated into position and sutured.  Once the tube was sutured into place, adequate blood supply was confirmed with blanching and refill.  The pedicle was then wrapped with xeroform gauze and dressed appropriately with a telfa and gauze bandage to ensure continued blood supply and protect the attached pedicle.
Paramedian Forehead Flap Text: A decision was made to reconstruct the defect utilizing an interpolation axial flap and a staged reconstruction.  A telfa template was made of the defect.  This telfa template was then used to outline the paramedian forehead pedicle flap.  The donor area for the pedicle flap was then injected with anesthesia.  The flap was excised through the skin and subcutaneous tissue down to the layer of the underlying musculature.  The pedicle flap was carefully excised within this deep plane to maintain its blood supply.  The edges of the donor site were undermined.   The donor site was closed in a primary fashion.  The pedicle was then rotated into position and sutured.  Once the tube was sutured into place, adequate blood supply was confirmed with blanching and refill.  The pedicle was then wrapped with xeroform gauze and dressed appropriately with a telfa and gauze bandage to ensure continued blood supply and protect the attached pedicle.
Lip Wedge Excision Repair Text: Given the location of the defect and the proximity to free margins a full thickness wedge repair was deemed most appropriate.  Using a sterile surgical marker, the appropriate repair was drawn incorporating the defect and placing the expected incisions perpendicular to the vermilion border.  The vermilion border was also meticulously outlined to ensure appropriate reapproximation during the repair.  The area thus outlined was incised through and through with a #15 scalpel blade.  The muscularis and dermis were reaproximated with deep sutures following hemostasis. Care was taken to realign the vermilion border before proceeding with the superficial closure.  Once the vermilion was realigned the superfical and mucosal closure was finished.
Ftsg Text: The defect edges were debeveled with a #15 scalpel blade.  Given the location of the defect, shape of the defect and the proximity to free margins a full thickness skin graft was deemed most appropriate.  Using a sterile surgical marker, the primary defect shape was transferred to the donor site. The area thus outlined was incised deep to adipose tissue with a #15 scalpel blade.  The harvested graft was then trimmed of adipose tissue until only dermis and epidermis was left.  The skin margins of the secondary defect were undermined to an appropriate distance in all directions utilizing iris scissors.  The secondary defect was closed with interrupted buried subcutaneous sutures.  The skin edges were then re-apposed with running  sutures.  The skin graft was then placed in the primary defect and oriented appropriately.
Split-Thickness Skin Graft Text: The defect edges were debeveled with a #15 scalpel blade.  Given the location of the defect, shape of the defect and the proximity to free margins a split thickness skin graft was deemed most appropriate.  Using a sterile surgical marker, the primary defect shape was transferred to the donor site. The split thickness graft was then harvested.  The skin graft was then placed in the primary defect and oriented appropriately.
Cartilage Graft Text: The defect edges were debeveled with a #15 scalpel blade.  Given the location of the defect, shape of the defect, the fact the defect involved a full thickness cartilage defect a cartilage graft was deemed most appropriate.  An appropriate donor site was identified, cleansed, and anesthetized. The cartilage graft was then harvested and transferred to the recipient site, oriented appropriately and then sutured into place.  The secondary defect was then repaired using a primary closure.
Composite Graft Text: The defect edges were debeveled with a #15 scalpel blade.  Given the location of the defect, shape of the defect, the proximity to free margins and the fact the defect was full thickness a composite graft was deemed most appropriate.  The defect was outline and then transferred to the donor site.  A full thickness graft was then excised from the donor site. The graft was then placed in the primary defect, oriented appropriately and then sutured into place.  The secondary defect was then repaired using a primary closure.
Epidermal Autograft Text: The defect edges were debeveled with a #15 scalpel blade.  Given the location of the defect, shape of the defect and the proximity to free margins an epidermal autograft was deemed most appropriate.  Using a sterile surgical marker, the primary defect shape was transferred to the donor site. The epidermal graft was then harvested.  The skin graft was then placed in the primary defect and oriented appropriately.
Dermal Autograft Text: The defect edges were debeveled with a #15 scalpel blade.  Given the location of the defect, shape of the defect and the proximity to free margins a dermal autograft was deemed most appropriate.  Using a sterile surgical marker, the primary defect shape was transferred to the donor site. The area thus outlined was incised deep to adipose tissue with a #15 scalpel blade.  The harvested graft was then trimmed of adipose and epidermal tissue until only dermis was left.  The skin graft was then placed in the primary defect and oriented appropriately.
Skin Substitute Text: The defect edges were debeveled with a #15 scalpel blade.  Given the location of the defect, shape of the defect and the proximity to free margins a skin substitute graft was deemed most appropriate.  The graft material was trimmed to fit the size of the defect. The graft was then placed in the primary defect and oriented appropriately.
Tissue Cultured Epidermal Autograft Text: The defect edges were debeveled with a #15 scalpel blade.  Given the location of the defect, shape of the defect and the proximity to free margins a tissue cultured epidermal autograft was deemed most appropriate.  The graft was then trimmed to fit the size of the defect.  The graft was then placed in the primary defect and oriented appropriately.
Xenograft Text: The defect edges were debeveled with a #15 scalpel blade.  Given the location of the defect, shape of the defect and the proximity to free margins a xenograft was deemed most appropriate.  The graft was then trimmed to fit the size of the defect.  The graft was then placed in the primary defect and oriented appropriately.
Purse String (Intermediate) Text: Given the location of the defect and the characteristics of the surrounding skin a purse string intermediate closure was deemed most appropriate.  Undermining was performed circumferentially around the surgical defect.  A purse string suture was then placed and tightened.
Purse String (Simple) Text: Given the location of the defect and the characteristics of the surrounding skin a purse string simple closure was deemed most appropriate.  Undermining was performed circumferentially around the surgical defect.  A purse string suture was then placed and tightened.
Complex Repair And Single Advancement Flap Text: The defect edges were debeveled with a #15 scalpel blade.  The primary defect was closed partially with a complex linear closure.  Given the location of the remaining defect, shape of the defect and the proximity to free margins a single advancement flap was deemed most appropriate for complete closure of the defect.  Using a sterile surgical marker, an appropriate advancement flap was drawn incorporating the defect and placing the expected incisions within the relaxed skin tension lines where possible.    The area thus outlined was incised deep to adipose tissue with a #15 scalpel blade.  The skin margins were undermined to an appropriate distance in all directions utilizing iris scissors.
Complex Repair And Double Advancement Flap Text: The defect edges were debeveled with a #15 scalpel blade.  The primary defect was closed partially with a complex linear closure.  Given the location of the remaining defect, shape of the defect and the proximity to free margins a double advancement flap was deemed most appropriate for complete closure of the defect.  Using a sterile surgical marker, an appropriate advancement flap was drawn incorporating the defect and placing the expected incisions within the relaxed skin tension lines where possible.    The area thus outlined was incised deep to adipose tissue with a #15 scalpel blade.  The skin margins were undermined to an appropriate distance in all directions utilizing iris scissors.
Complex Repair And Modified Advancement Flap Text: The defect edges were debeveled with a #15 scalpel blade.  The primary defect was closed partially with a complex linear closure.  Given the location of the remaining defect, shape of the defect and the proximity to free margins a modified advancement flap was deemed most appropriate for complete closure of the defect.  Using a sterile surgical marker, an appropriate advancement flap was drawn incorporating the defect and placing the expected incisions within the relaxed skin tension lines where possible.    The area thus outlined was incised deep to adipose tissue with a #15 scalpel blade.  The skin margins were undermined to an appropriate distance in all directions utilizing iris scissors.
Complex Repair And A-T Advancement Flap Text: The defect edges were debeveled with a #15 scalpel blade.  The primary defect was closed partially with a complex linear closure.  Given the location of the remaining defect, shape of the defect and the proximity to free margins an A-T advancement flap was deemed most appropriate for complete closure of the defect.  Using a sterile surgical marker, an appropriate advancement flap was drawn incorporating the defect and placing the expected incisions within the relaxed skin tension lines where possible.    The area thus outlined was incised deep to adipose tissue with a #15 scalpel blade.  The skin margins were undermined to an appropriate distance in all directions utilizing iris scissors.
Complex Repair And O-T Advancement Flap Text: The defect edges were debeveled with a #15 scalpel blade.  The primary defect was closed partially with a complex linear closure.  Given the location of the remaining defect, shape of the defect and the proximity to free margins an O-T advancement flap was deemed most appropriate for complete closure of the defect.  Using a sterile surgical marker, an appropriate advancement flap was drawn incorporating the defect and placing the expected incisions within the relaxed skin tension lines where possible.    The area thus outlined was incised deep to adipose tissue with a #15 scalpel blade.  The skin margins were undermined to an appropriate distance in all directions utilizing iris scissors.
Complex Repair And O-L Flap Text: The defect edges were debeveled with a #15 scalpel blade.  The primary defect was closed partially with a complex linear closure.  Given the location of the remaining defect, shape of the defect and the proximity to free margins an O-L flap was deemed most appropriate for complete closure of the defect.  Using a sterile surgical marker, an appropriate flap was drawn incorporating the defect and placing the expected incisions within the relaxed skin tension lines where possible.    The area thus outlined was incised deep to adipose tissue with a #15 scalpel blade.  The skin margins were undermined to an appropriate distance in all directions utilizing iris scissors.
Complex Repair And Bilobe Flap Text: The defect edges were debeveled with a #15 scalpel blade.  The primary defect was closed partially with a complex linear closure.  Given the location of the remaining defect, shape of the defect and the proximity to free margins a bilobe flap was deemed most appropriate for complete closure of the defect.  Using a sterile surgical marker, an appropriate advancement flap was drawn incorporating the defect and placing the expected incisions within the relaxed skin tension lines where possible.    The area thus outlined was incised deep to adipose tissue with a #15 scalpel blade.  The skin margins were undermined to an appropriate distance in all directions utilizing iris scissors.
Complex Repair And Melolabial Flap Text: The defect edges were debeveled with a #15 scalpel blade.  The primary defect was closed partially with a complex linear closure.  Given the location of the remaining defect, shape of the defect and the proximity to free margins a melolabial flap was deemed most appropriate for complete closure of the defect.  Using a sterile surgical marker, an appropriate advancement flap was drawn incorporating the defect and placing the expected incisions within the relaxed skin tension lines where possible.    The area thus outlined was incised deep to adipose tissue with a #15 scalpel blade.  The skin margins were undermined to an appropriate distance in all directions utilizing iris scissors.
Complex Repair And Rotation Flap Text: The defect edges were debeveled with a #15 scalpel blade.  The primary defect was closed partially with a complex linear closure.  Given the location of the remaining defect, shape of the defect and the proximity to free margins a rotation flap was deemed most appropriate for complete closure of the defect.  Using a sterile surgical marker, an appropriate advancement flap was drawn incorporating the defect and placing the expected incisions within the relaxed skin tension lines where possible.    The area thus outlined was incised deep to adipose tissue with a #15 scalpel blade.  The skin margins were undermined to an appropriate distance in all directions utilizing iris scissors.
Complex Repair And Rhombic Flap Text: The defect edges were debeveled with a #15 scalpel blade.  The primary defect was closed partially with a complex linear closure.  Given the location of the remaining defect, shape of the defect and the proximity to free margins a rhombic flap was deemed most appropriate for complete closure of the defect.  Using a sterile surgical marker, an appropriate advancement flap was drawn incorporating the defect and placing the expected incisions within the relaxed skin tension lines where possible.    The area thus outlined was incised deep to adipose tissue with a #15 scalpel blade.  The skin margins were undermined to an appropriate distance in all directions utilizing iris scissors.
Complex Repair And Transposition Flap Text: The defect edges were debeveled with a #15 scalpel blade.  The primary defect was closed partially with a complex linear closure.  Given the location of the remaining defect, shape of the defect and the proximity to free margins a transposition flap was deemed most appropriate for complete closure of the defect.  Using a sterile surgical marker, an appropriate advancement flap was drawn incorporating the defect and placing the expected incisions within the relaxed skin tension lines where possible.    The area thus outlined was incised deep to adipose tissue with a #15 scalpel blade.  The skin margins were undermined to an appropriate distance in all directions utilizing iris scissors.
Complex Repair And V-Y Plasty Text: The defect edges were debeveled with a #15 scalpel blade.  The primary defect was closed partially with a complex linear closure.  Given the location of the remaining defect, shape of the defect and the proximity to free margins a V-Y plasty was deemed most appropriate for complete closure of the defect.  Using a sterile surgical marker, an appropriate advancement flap was drawn incorporating the defect and placing the expected incisions within the relaxed skin tension lines where possible.    The area thus outlined was incised deep to adipose tissue with a #15 scalpel blade.  The skin margins were undermined to an appropriate distance in all directions utilizing iris scissors.
Complex Repair And M Plasty Text: The defect edges were debeveled with a #15 scalpel blade.  The primary defect was closed partially with a complex linear closure.  Given the location of the remaining defect, shape of the defect and the proximity to free margins an M plasty was deemed most appropriate for complete closure of the defect.  Using a sterile surgical marker, an appropriate advancement flap was drawn incorporating the defect and placing the expected incisions within the relaxed skin tension lines where possible.    The area thus outlined was incised deep to adipose tissue with a #15 scalpel blade.  The skin margins were undermined to an appropriate distance in all directions utilizing iris scissors.
Complex Repair And Double M Plasty Text: The defect edges were debeveled with a #15 scalpel blade.  The primary defect was closed partially with a complex linear closure.  Given the location of the remaining defect, shape of the defect and the proximity to free margins a double M plasty was deemed most appropriate for complete closure of the defect.  Using a sterile surgical marker, an appropriate advancement flap was drawn incorporating the defect and placing the expected incisions within the relaxed skin tension lines where possible.    The area thus outlined was incised deep to adipose tissue with a #15 scalpel blade.  The skin margins were undermined to an appropriate distance in all directions utilizing iris scissors.
Complex Repair And W Plasty Text: The defect edges were debeveled with a #15 scalpel blade.  The primary defect was closed partially with a complex linear closure.  Given the location of the remaining defect, shape of the defect and the proximity to free margins a W plasty was deemed most appropriate for complete closure of the defect.  Using a sterile surgical marker, an appropriate advancement flap was drawn incorporating the defect and placing the expected incisions within the relaxed skin tension lines where possible.    The area thus outlined was incised deep to adipose tissue with a #15 scalpel blade.  The skin margins were undermined to an appropriate distance in all directions utilizing iris scissors.
Complex Repair And Z Plasty Text: The defect edges were debeveled with a #15 scalpel blade.  The primary defect was closed partially with a complex linear closure.  Given the location of the remaining defect, shape of the defect and the proximity to free margins a Z plasty was deemed most appropriate for complete closure of the defect.  Using a sterile surgical marker, an appropriate advancement flap was drawn incorporating the defect and placing the expected incisions within the relaxed skin tension lines where possible.    The area thus outlined was incised deep to adipose tissue with a #15 scalpel blade.  The skin margins were undermined to an appropriate distance in all directions utilizing iris scissors.
Complex Repair And Dorsal Nasal Flap Text: The defect edges were debeveled with a #15 scalpel blade.  The primary defect was closed partially with a complex linear closure.  Given the location of the remaining defect, shape of the defect and the proximity to free margins a dorsal nasal flap was deemed most appropriate for complete closure of the defect.  Using a sterile surgical marker, an appropriate flap was drawn incorporating the defect and placing the expected incisions within the relaxed skin tension lines where possible.    The area thus outlined was incised deep to adipose tissue with a #15 scalpel blade.  The skin margins were undermined to an appropriate distance in all directions utilizing iris scissors.
Complex Repair And Ftsg Text: The defect edges were debeveled with a #15 scalpel blade.  The primary defect was closed partially with a complex linear closure.  Given the location of the defect, shape of the defect and the proximity to free margins a full thickness skin graft was deemed most appropriate to repair the remaining defect.  The graft was trimmed to fit the size of the remaining defect.  The graft was then placed in the primary defect, oriented appropriately, and sutured into place.
Complex Repair And Split-Thickness Skin Graft Text: The defect edges were debeveled with a #15 scalpel blade.  The primary defect was closed partially with a complex linear closure.  Given the location of the defect, shape of the defect and the proximity to free margins a split thickness skin graft was deemed most appropriate to repair the remaining defect.  The graft was trimmed to fit the size of the remaining defect.  The graft was then placed in the primary defect, oriented appropriately, and sutured into place.
Complex Repair And Epidermal Autograft Text: The defect edges were debeveled with a #15 scalpel blade.  The primary defect was closed partially with a complex linear closure.  Given the location of the defect, shape of the defect and the proximity to free margins an epidermal autograft was deemed most appropriate to repair the remaining defect.  The graft was trimmed to fit the size of the remaining defect.  The graft was then placed in the primary defect, oriented appropriately, and sutured into place.
Complex Repair And Dermal Autograft Text: The defect edges were debeveled with a #15 scalpel blade.  The primary defect was closed partially with a complex linear closure.  Given the location of the defect, shape of the defect and the proximity to free margins an dermal autograft was deemed most appropriate to repair the remaining defect.  The graft was trimmed to fit the size of the remaining defect.  The graft was then placed in the primary defect, oriented appropriately, and sutured into place.
Complex Repair And Tissue Cultured Epidermal Autograft Text: The defect edges were debeveled with a #15 scalpel blade.  The primary defect was closed partially with a complex linear closure.  Given the location of the defect, shape of the defect and the proximity to free margins an tissue cultured epidermal autograft was deemed most appropriate to repair the remaining defect.  The graft was trimmed to fit the size of the remaining defect.  The graft was then placed in the primary defect, oriented appropriately, and sutured into place.
Complex Repair And Xenograft Text: The defect edges were debeveled with a #15 scalpel blade.  The primary defect was closed partially with a complex linear closure.  Given the location of the defect, shape of the defect and the proximity to free margins a xenograft was deemed most appropriate to repair the remaining defect.  The graft was trimmed to fit the size of the remaining defect.  The graft was then placed in the primary defect, oriented appropriately, and sutured into place.
Complex Repair And Skin Substitute Graft Text: The defect edges were debeveled with a #15 scalpel blade.  The primary defect was closed partially with a complex linear closure.  Given the location of the remaining defect, shape of the defect and the proximity to free margins a skin substitute graft was deemed most appropriate to repair the remaining defect.  The graft was trimmed to fit the size of the remaining defect.  The graft was then placed in the primary defect, oriented appropriately, and sutured into place.
Path Notes (To The Dermatopathologist): Please check margins.
Consent was obtained from the patient. The risks and benefits to therapy were discussed in detail. Specifically, the risks of infection, scarring, bleeding, prolonged wound healing, incomplete removal, allergy to anesthesia, nerve injury and recurrence were addressed. Prior to the procedure, the treatment site was clearly identified and confirmed by the patient. All components of Universal Protocol/PAUSE Rule completed.
Render Post-Care Instructions In Note?: yes
Post-Care Instructions: I reviewed with the patient in detail post-care instructions. Patient is not to engage in any heavy lifting, exercise, or swimming for the next 14 days. Should the patient develop any fevers, chills, bleeding, severe pain patient will contact the office immediately.
Where Do You Want The Question To Include Opioid Counseling Located?: Case Summary Tab
Billing Type: Third-Party Bill
Information: Selecting Yes will display possible errors in your note based on the variables you have selected. This validation is only offered as a suggestion for you. PLEASE NOTE THAT THE VALIDATION TEXT WILL BE REMOVED WHEN YOU FINALIZE YOUR NOTE. IF YOU WANT TO FAX A PRELIMINARY NOTE YOU WILL NEED TO TOGGLE THIS TO 'NO' IF YOU DO NOT WANT IT IN YOUR FAXED NOTE.

## 2020-03-06 DIAGNOSIS — G47.33 OBSTRUCTIVE SLEEP APNEA: ICD-10-CM

## 2020-03-18 ENCOUNTER — APPOINTMENT (OUTPATIENT)
Dept: VASCULAR LAB | Facility: MEDICAL CENTER | Age: 60
End: 2020-03-18
Payer: COMMERCIAL

## 2020-04-08 ENCOUNTER — OFFICE VISIT (OUTPATIENT)
Dept: URGENT CARE | Facility: MEDICAL CENTER | Age: 60
End: 2020-04-08
Payer: COMMERCIAL

## 2020-04-08 ENCOUNTER — TELEPHONE (OUTPATIENT)
Dept: HEALTH INFORMATION MANAGEMENT | Facility: OTHER | Age: 60
End: 2020-04-08

## 2020-04-08 VITALS
DIASTOLIC BLOOD PRESSURE: 63 MMHG | TEMPERATURE: 97 F | BODY MASS INDEX: 23.39 KG/M2 | HEIGHT: 63 IN | WEIGHT: 132 LBS | SYSTOLIC BLOOD PRESSURE: 134 MMHG | HEART RATE: 78 BPM

## 2020-04-08 DIAGNOSIS — J03.90 EXUDATIVE TONSILLITIS: Primary | ICD-10-CM

## 2020-04-08 DIAGNOSIS — H92.02 ACUTE EAR PAIN, LEFT: ICD-10-CM

## 2020-04-08 PROCEDURE — 99204 OFFICE O/P NEW MOD 45 MIN: CPT | Mod: 95,CR | Performed by: PHYSICIAN ASSISTANT

## 2020-04-08 RX ORDER — CEPHALEXIN 500 MG/1
500 CAPSULE ORAL 4 TIMES DAILY
Qty: 28 CAP | Refills: 0 | Status: SHIPPED | OUTPATIENT
Start: 2020-04-08 | End: 2020-04-15

## 2020-04-08 RX ORDER — METHYLPREDNISOLONE 4 MG/1
TABLET ORAL
Qty: 21 TAB | Refills: 0 | Status: SHIPPED | OUTPATIENT
Start: 2020-04-08 | End: 2020-05-06

## 2020-04-08 ASSESSMENT — FIBROSIS 4 INDEX: FIB4 SCORE: 1.38

## 2020-04-08 NOTE — TELEPHONE ENCOUNTER
1. Caller Name: Danii                      Call Back Number: [phone]  [facility] PCP or Specialty Provider: Yes         2.  Does patient have any active symptoms of respiratory illness (fever OR cough OR shortness of breath OR sore throat)? Yes, the patient reports the following respiratory symptoms: sore throat. Chronic allergies. Something going on with tonsils. Started 10 days ago. Pus in tonsils. Affecting ears now. Tired. Headache. Advil helps ears from hurting and can sleep.     3.  Does patient have any comoribidities? None     4.  Has the patient traveled in the last 14 days OR had any known contact with someone who is suspected or confirmed to have COVID-19?  No.    5. Disposition: Offered patient virtual visit to limit potential exposure to others; patient also given self care instructions    Note routed to [facility] Provider: VU only.

## 2020-04-08 NOTE — PROGRESS NOTES
Subjective:      Pt is a 59 y.o. female who presents with Pharyngitis            HPI   Patient was presented for a telehealth consultation via secure and encrypted videoconferencing technology.   Verbal consent was obtained. Patient's identity was verified.  This is a new problem. Pt notes white exudate and drainage from tonsils, particularly on the left and left ear pain x 3-4 days. Pt has not taken any Rx medications for this condition. Pt states the pain is a 5-6/10, aching in nature and worse at night. Pt denies fevers, CP, SOB, NVD, paresthesias, headaches, dizziness, change in vision, hives, or other joint pain. The pt's medication list, problem list, and allergies have been evaluated and reviewed during today's visit.    PMH:  Past Medical History:   Diagnosis Date   • Abdominal pain    • Allergic rhinitis    • Anesthesia     ponv   • Back pain    • Blood in urine    • Chest tightness    • Constipation    • Cough    • Daytime sleepiness    • Diarrhea    • Earache    • Eye drainage    • Fatigue    • Frequent headaches    • GERD (gastroesophageal reflux disease)    • Heartburn    • High cholesterol    • Hoarseness, persistent    • Hypertension    • Indigestion    • Irregular periods    • Morning headache    • MRSA (methicillin resistant staph aureus) culture positive 2010    from spider bite- no open wounds as of 2018   • Nasal drainage    • Painful joint    • Painful urination    • Palpitations    • Restless leg syndrome    • Rhinitis    • Ringing in ears    • Sore muscles    • Swelling of lower extremity    • Unspecified disorder of thyroid    • Unspecified urinary incontinence    • Wears glasses        PSH:  Past Surgical History:   Procedure Laterality Date   • TRIGGER FINGER RELEASE Right 11/20/2018    Procedure: TRIGGER FINGER RELEASE - THUMB;  Surgeon: Edgar Leblanc M.D.;  Location: SURGERY [facility];  Service: Orthopedics   • JOINT INJECTION DIAGNOSTIC Bilateral 11/20/2018    Procedure: JOINT  INJECTION DIAGNOSTIC - [facility] JOINTS;  Surgeon: Edgar Leblanc M.D.;  Location: SURGERY [facility];  Service: Orthopedics   • CARPAL TUNNEL ENDOSCOPIC  2014    Both side Performed by Edgar Leblanc M.D. at SURGERY [facility]   • ABDOMINAL HYSTERECTOMY TOTAL      Hysterectomy and removal Ovaries and tubes, Total Abdominal in    • BONE GRAFT      bone graft surgery on left second toe in  due to fracture   • CARPAL TUNNEL RELEASE     • CYSTOSCOPY      Removal of polyps from urinary bladder and dilation of urethra in    • HYSTERECTOMY LAPAROSCOPY     • INCISION AND DRAINAGE ENT      I&D of left ear for ear infection twice in  and  by Dr Kahn, ENT   • NASAL SEPTAL RECONSTRUCTION             Fam Hx:    family history includes Bipolar disorder in her mother and sister; Cancer in her paternal aunt and paternal grandmother; Diabetes in her father; Heart Disease in her father and paternal grandmother; Hypertension in her father and mother; Other in her mother, paternal grandmother, and sister; Stroke in her sister; Thyroid in her mother.  Family Status   Relation Name Status   • PGMo     • PAunt  Alive   • Mo  Alive   • Fa   at age 56        heart attack   • Sis  Alive   • Sis  Alive   • Venecia  Alive   • Son  Alive       Soc HX:  Social History     Socioeconomic History   • Marital status:      Spouse name: Not on file   • Number of children: Not on file   • Years of education: Not on file   • Highest education level: Not on file   Occupational History   • Not on file   Social Needs   • Financial resource strain: Not on file   • Food insecurity     Worry: Not on file     Inability: Not on file   • Transportation needs     Medical: Not on file     Non-medical: Not on file   Tobacco Use   • Smoking status: Former Smoker     Packs/day: 0.00     Last attempt to quit: 1976     Years since quittin.2   • Smokeless tobacco: Never Used   Substance and Sexual  Activity   • Alcohol use: Yes     Alcohol/week: 3.6 oz     Types: 6 Glasses of wine per week     Frequency: 4 or more times a week     Drinks per session: 1 or 2     Binge frequency: Never   • Drug use: No   • Sexual activity: Not Currently   Lifestyle   • Physical activity     Days per week: Not on file     Minutes per session: Not on file   • Stress: Not on file   Relationships   • Social connections     Talks on phone: Not on file     Gets together: Not on file     Attends Cheondoism service: Not on file     Active member of club or organization: Not on file     Attends meetings of clubs or organizations: Not on file     Relationship status: Not on file   • Intimate partner violence     Fear of current or ex partner: Not on file     Emotionally abused: Not on file     Physically abused: Not on file     Forced sexual activity: Not on file   Other Topics Concern   • Not on file   Social History Narrative   • Not on file         Medications:    Current Outpatient Medications:   •  cephALEXin (KEFLEX) 500 MG Cap, Take 1 Cap by mouth 4 times a day for 7 days., Disp: 28 Cap, Rfl: 0  •  methylPREDNISolone (MEDROL DOSEPAK) 4 MG Tablet Therapy Pack, Follow schedule on package instructions., Disp: 21 Tab, Rfl: 0  •  ezetimibe (ZETIA) 10 MG Tab, Take 10 mg by mouth every morning., Disp: , Rfl:   •  fluticasone (FLONASE) 50 MCG/ACT nasal spray, Spray 2 Sprays in nose every morning as needed (ALLERGY)., Disp: , Rfl:   •  lisinopril (PRINIVIL) 20 MG Tab, Take 20 mg by mouth every morning., Disp: , Rfl:   •  scopolamine (TRANSDERM-SCOP) 1 mg/72hr PATCH 72 HR, Apply 1 Patch to skin as directed every 3 days as needed (NAUSEA)., Disp: , Rfl:   •  NON SPECIFIED, 1 Each by Injection route Q30 DAYS. SEASONAL SHOT, Disp: , Rfl:   •  Plant Sterols and Stanols (CHOLESTOFF PO), Take 1 Tab by mouth 2 Times a Day., Disp: , Rfl:   •  coenzyme Q-10 30 MG capsule, Take 30 mg by mouth every morning., Disp: , Rfl:   •  Multiple Vitamins-Minerals  "(WOMENS 50+ MULTI VITAMIN/MIN PO), Take 1 Tab by mouth every morning., Disp: , Rfl:   •  albuterol 108 (90 Base) MCG/ACT Aero Soln inhalation aerosol, Inhale 2 Puffs by mouth every 6 hours as needed for Shortness of Breath., Disp: , Rfl:   •  Probiotic Product (PROBIOTIC COLON SUPPORT PO), Take 1 Cap by mouth every morning., Disp: , Rfl:   •  Cholecalciferol (VITAMIN D3) 2000 UNIT Cap, Take 4,000 Units by mouth every day., Disp: , Rfl:       Allergies:  Ciprofloxacin; Hydrocodone; Sulfa drugs; Latex; Other environmental; and Statins [hmg-coa-r inhibitors]    ROS  Review of Systems   Constitutional: Positive for malaise/fatigue. Negative for fever.   HENT: Positive for congestion and drainage from tonsils and ear pain  Eyes: Negative for blurred vision, double vision and photophobia.   Respiratory: Positive for cough and sputum production. Negative for hemoptysis, shortness of breath and wheezing.    Cardiovascular: Negative for chest pain and palpitations.   Gastrointestinal: Negative for nausea, vomiting, abdominal pain, diarrhea and constipation.   Genitourinary: Negative for dysuria and flank pain.   Musculoskeletal: Negative for joint pain and myalgias.   Skin: Negative for itching and rash.   Neurological:  Negative for dizziness and tingling.   Endo/Heme/Allergies: Does not bruise/bleed easily.   Psychiatric/Behavioral: Negative for depression. The patient is not nervous/anxious.           Objective:     /63   Pulse 78   Temp 36.1 °C (97 °F)   Ht 1.6 m (5' 3\")   Wt 59.9 kg (132 lb)   BMI 23.38 kg/m²      Physical Exam      Physical Exam   Constitutional: PT is oriented to person, place, and time. PT appears well-developed and well-nourished. No distress.   HENT:   Head: Normocephalic and atraumatic.   Ears: Ext ears WNL: pt notes inner pain left worse than right  Nose: No mucosal edema with no sinus TTP over maxillary and frontal sinuses.   Mouth/Throat: Uvula is midline. Mucous membranes are pale.  " +tonsilar exudate.   Eyes: Conjunctivae normal and EOM are normal. Pupils are equal, round, and reactive to light. Right eye exhibits no discharge. Left eye exhibits no discharge.   Neck: Normal range of motion. Neck supple. No thyromegaly present.   Pulmonary/Chest: Effort normal. No respiratory distress.   Abdominal: Soft. There is no tenderness.    Musculoskeletal: Normal range of motion. PT exhibits no edema and no tenderness.   Neurological: Pt is alert and oriented to person, place, and time.   Skin: Skin is warm and dry. No rash noted.   Psychiatric: PT has a normal mood and affect. Pt behavior is normal. Judgment and thought content normal.          Assessment/Plan:       1. Exudative tonsillitis    - cephALEXin (KEFLEX) 500 MG Cap; Take 1 Cap by mouth 4 times a day for 7 days.  Dispense: 28 Cap; Refill: 0  - methylPREDNISolone (MEDROL DOSEPAK) 4 MG Tablet Therapy Pack; Follow schedule on package instructions.  Dispense: 21 Tab; Refill: 0    2. Acute ear pain, left    - cephALEXin (KEFLEX) 500 MG Cap; Take 1 Cap by mouth 4 times a day for 7 days.  Dispense: 28 Cap; Refill: 0  - methylPREDNISolone (MEDROL DOSEPAK) 4 MG Tablet Therapy Pack; Follow schedule on package instructions.  Dispense: 21 Tab; Refill: 0    Patient was presented for a telehealth consultation via secure and encrypted videoconferencing technology.   Verbal consent was obtained. Patient's identity was verified.    Rest, fluids encouraged.  AVS with medical info given.  Pt was in full understanding and agreement with the plan.  Differential diagnosis, natural history, supportive care, and indications for immediate follow-up discussed. All questions answered. Patient agrees with the plan of care.  Follow-up as needed if symptoms worsen or fail to improve to PCP, Urgent care or Emergency Room.

## 2020-05-04 VITALS — WEIGHT: 132 LBS | BODY MASS INDEX: 23.39 KG/M2 | HEIGHT: 63 IN | TEMPERATURE: 97.1 F

## 2020-05-04 ASSESSMENT — FIBROSIS 4 INDEX: FIB4 SCORE: 1.38

## 2020-05-06 ENCOUNTER — TELEMEDICINE (OUTPATIENT)
Dept: MEDICAL GROUP | Facility: MEDICAL CENTER | Age: 60
End: 2020-05-06
Payer: COMMERCIAL

## 2020-05-06 DIAGNOSIS — R92.2 DENSE BREAST: ICD-10-CM

## 2020-05-06 DIAGNOSIS — D72.9 NEUTROPHILIA: ICD-10-CM

## 2020-05-06 DIAGNOSIS — R20.2 PARESTHESIA: ICD-10-CM

## 2020-05-06 DIAGNOSIS — Z86.39 HISTORY OF HYPOTHYROIDISM: ICD-10-CM

## 2020-05-06 DIAGNOSIS — Z00.00 PREVENTATIVE HEALTH CARE: ICD-10-CM

## 2020-05-06 DIAGNOSIS — R74.01 ELEVATED ALT MEASUREMENT: ICD-10-CM

## 2020-05-06 DIAGNOSIS — Z12.31 ENCOUNTER FOR SCREENING MAMMOGRAM FOR MALIGNANT NEOPLASM OF BREAST: ICD-10-CM

## 2020-05-06 DIAGNOSIS — R92.30 DENSE BREAST: ICD-10-CM

## 2020-05-06 PROCEDURE — 99214 OFFICE O/P EST MOD 30 MIN: CPT | Mod: 95,CR | Performed by: INTERNAL MEDICINE

## 2020-05-06 NOTE — PROGRESS NOTES
"Telemedicine Visit: Established Patient     This encounter was conducted via [employer].   Verbal consent was obtained. Patient's identity was verified.    Subjective:   CC: follow up  Danii Rodriguez is a 59 y.o. female presenting for evaluation and management of:    \"I'm feeling really well.  I'm really doing much better.\"     She says she had resolution of dizziness, headache and many of her symptoms.  Palpitations at night have significantly decreased, she will consider if she wants to follow through with the previously ordered zio patch. No new cardiopulm symptoms. Pending overnight oximetry arranged by her sleep specialist.  Pt feels she has had resolution of paramjit symptoms. Though advised, potentially her occasional palpitations may be related.  She is still considering if she will proceed with o/n oximetry.    She does not feel she needs to see neuro for history of TGA. No further amnesic events. No new neuro symptoms.    Rare to occasional \"sharp\" or burning pain in toes and plantar aspect at night.  D/w pt wide potential causes. Offered expanded labs, potential for PMR referral, etc. At this time she prefers conservative approach since symptom is infrequent.  No focal weakness. She states thoracic and related back pain has resolved.    D/w her importance of pending ct-thorax f/u on mediastinal lad.  She has no f/c, or unintentional wt loss. Still menopausal chronic night sweats.  D/w her only way to insure prior imaging changes were 2/2 infection/inflammation, etc and not malignancy was to have interval imaging and she expressed understanding.     Labs r'd 1/27/20 cbc w/nl total Hb, wbc, Plt; noted differential minimal depression of neutrophils. Vitamin D @ 43, normal iron panel.  More remote labs 10/9/19 ALT 57. No current GI symptoms.    Due for mammogram, hx dense breast. Asymptomatic. Pt would like breast ultrasound.        ROS   Denies any recent fevers or chills. No nausea or vomiting. No chest pains " or shortness of breath.     Allergies   Allergen Reactions   • Ciprofloxacin Hives and Nausea     Okay to take cipro ear drops, no reaction with ear drop   • Hydrocodone Rash     RASH     • Sulfa Drugs Rash and Itching     Not had but was told not to use   • Latex Rash and Itching     Tape burns and rash- paper tape OK   • Other Environmental Itching     seasonal   • Statins [Hmg-Coa-R Inhibitors] Unspecified     Hypertensive Crisis       Current medicines (including changes today)  Current Outpatient Medications   Medication Sig Dispense Refill   • methylPREDNISolone (MEDROL DOSEPAK) 4 MG Tablet Therapy Pack Follow schedule on package instructions. 21 Tab 0   • ezetimibe (ZETIA) 10 MG Tab Take 10 mg by mouth every morning.     • fluticasone (FLONASE) 50 MCG/ACT nasal spray Spray 2 Sprays in nose every morning as needed (ALLERGY).     • lisinopril (PRINIVIL) 20 MG Tab Take 20 mg by mouth every morning.     • scopolamine (TRANSDERM-SCOP) 1 mg/72hr PATCH 72 HR Apply 1 Patch to skin as directed every 3 days as needed (NAUSEA).     • NON SPECIFIED 1 Each by Injection route Q30 DAYS. SEASONAL SHOT     • Plant Sterols and Stanols (CHOLESTOFF PO) Take 1 Tab by mouth 2 Times a Day.     • coenzyme Q-10 30 MG capsule Take 30 mg by mouth every morning.     • Multiple Vitamins-Minerals (WOMENS 50+ MULTI VITAMIN/MIN PO) Take 1 Tab by mouth every morning.     • albuterol 108 (90 Base) MCG/ACT Aero Soln inhalation aerosol Inhale 2 Puffs by mouth every 6 hours as needed for Shortness of Breath.     • Probiotic Product (PROBIOTIC COLON SUPPORT PO) Take 1 Cap by mouth every morning.     • Cholecalciferol (VITAMIN D3) 2000 UNIT Cap Take 4,000 Units by mouth every day.       No current facility-administered medications for this visit.        Patient Active Problem List    Diagnosis Date Noted   • Statin intolerance 10/31/2019   • Stress and adjustment reaction 10/14/2019   • Transient global amnesia 10/09/2019   • Mediastinal  adenopathy 10/09/2019   • Primary osteoarthritis of both hands 2019   • Primary insomnia 2018   • Hot flashes, menopausal 2018   • Vitamin D insufficiency 10/25/2017   • Chronic fatigue 2017   • Bilateral leg cramps 2017   • Essential hypertension 2017   • Muscle spasms of neck 2017   • Left elbow pain 2016   • Ulcer of esophagus without bleeding 2016   • Pressure sensation in left ear, left temporal area and left TM joint 2016   • History of hypothyroidism 2016   • Pure hypercholesterolemia 2016   • History of carpal tunnel repair 2014   • Seasonal allergies 2014   • Gastroesophageal reflux disease without esophagitis 2014       Family History   Problem Relation Age of Onset   • Cancer Paternal Grandmother    • Other Paternal Grandmother         lupus   • Heart Disease Paternal Grandmother          70s   • Cancer Paternal Aunt    • Other Mother         Parkinson disease   • Bipolar disorder Mother    • Hypertension Mother    • Thyroid Mother         Hypothyroid   • Heart Disease Father         Congenital heart disease, CHF   • Hypertension Father    • Diabetes Father    • Stroke Sister         age 53, smoker   • Bipolar disorder Sister    • Other Sister         alcoholic        She  has a past medical history of Abdominal pain, Allergic rhinitis, Anesthesia, Back pain, Blood in urine, Chest tightness, Constipation, Cough, Daytime sleepiness, Diarrhea, Earache, Eye drainage, Fatigue, Frequent headaches, GERD (gastroesophageal reflux disease), Heartburn, High cholesterol, Hoarseness, persistent, Hypertension, Indigestion, Irregular periods, Morning headache, MRSA (methicillin resistant staph aureus) culture positive (), Nasal drainage, Painful joint, Painful urination, Palpitations, Restless leg syndrome, Rhinitis, Ringing in ears, Sore muscles, Swelling of lower extremity, Unspecified disorder of thyroid, Unspecified  urinary incontinence, and Wears glasses.  She  has a past surgical history that includes carpal tunnel endoscopic (9/30/2014); abdominal hysterectomy total; nasal septal reconstruction; cystoscopy; bone graft; incision and drainage ent; trigger finger release (Right, 11/20/2018); joint injection diagnostic (Bilateral, 11/20/2018); carpal tunnel release; and hysterectomy laparoscopy.       Objective:   Vitals obtained by patient:  Temp: 97.1, Height: 5'3'' and Weight: 132#    Physical Exam:  Constitutional: Alert, no distress, well-groomed.  Skin: No rashes in visible areas.  Eye: Round. Conjunctiva clear, lids normal. No icterus.   ENMT: Lips pink without lesions, good dentition, moist mucous membranes. Phonation normal.  Neck: No masses, no thyromegaly. Moves freely without pain.  CV: Pulse as reported by patient  Respiratory: Unlabored respiratory effort, no cough or audible wheeze  Psych: Alert and oriented x3, normal affect and mood.       Assessment and Plan:   The following treatment plan was discussed:     1. Elevated ALT measurement  - Comp Metabolic Panel; Future    2. Neutrophilia  - CBC WITH DIFFERENTIAL; Future  - VITAMIN B12; Future    3. History of hypothyroidism  - TSH WITH REFLEX TO FT4; Future    4. Encounter for screening mammogram for malignant neoplasm of breast  - MA-SCREENING MAMMO BILAT W/TOMOSYNTHESIS W/CAD; Future  - US-SCREENING WHOLE BREAST (3D SCREENING); Future    5. Dense breast  - MA-SCREENING MAMMO BILAT W/TOMOSYNTHESIS W/CAD; Future  - US-SCREENING WHOLE BREAST (3D SCREENING); Future    6. Paresthesia  - CBC WITH DIFFERENTIAL; Future  - TSH WITH REFLEX TO FT4; Future  - Comp Metabolic Panel; Future  - VITAMIN B12; Future  - HEMOGLOBIN A1C; Future    7. Preventative health care  - CBC WITH DIFFERENTIAL; Future  - TSH WITH REFLEX TO FT4; Future  - Comp Metabolic Panel; Future  - VITAMIN B12; Future  - HEMOGLOBIN A1C; Future    Patient reports doing better.      Overall stable medical  conditions are chronic.      New issue of occasional to rare foot paresthesias.  Pt prefers conservative approach at this time, will review some basic labs. Advised try OTC b-complex. Watch diet for excessive glucose.  She will let me know if symptom persists, happy to expand work up at that time.         Follow-up: Return in about 6 months (around 11/6/2020). this is her preference

## 2020-05-15 ENCOUNTER — HOSPITAL ENCOUNTER (OUTPATIENT)
Dept: LAB | Facility: MEDICAL CENTER | Age: 60
End: 2020-05-15
Attending: INTERNAL MEDICINE
Payer: COMMERCIAL

## 2020-05-15 DIAGNOSIS — Z00.00 PREVENTATIVE HEALTH CARE: ICD-10-CM

## 2020-05-15 DIAGNOSIS — R20.2 PARESTHESIA: ICD-10-CM

## 2020-05-15 DIAGNOSIS — D72.9 NEUTROPHILIA: ICD-10-CM

## 2020-05-15 DIAGNOSIS — R74.01 ELEVATED ALT MEASUREMENT: ICD-10-CM

## 2020-05-15 DIAGNOSIS — Z86.39 HISTORY OF HYPOTHYROIDISM: ICD-10-CM

## 2020-05-15 LAB
ALBUMIN SERPL BCP-MCNC: 4.9 G/DL (ref 3.2–4.9)
ALBUMIN/GLOB SERPL: 2.1 G/DL
ALP SERPL-CCNC: 62 U/L (ref 30–99)
ALT SERPL-CCNC: 27 U/L (ref 2–50)
ANION GAP SERPL CALC-SCNC: 11 MMOL/L (ref 7–16)
AST SERPL-CCNC: 24 U/L (ref 12–45)
BASOPHILS # BLD AUTO: 0.5 % (ref 0–1.8)
BASOPHILS # BLD: 0.03 K/UL (ref 0–0.12)
BILIRUB SERPL-MCNC: 0.4 MG/DL (ref 0.1–1.5)
BUN SERPL-MCNC: 15 MG/DL (ref 8–22)
CALCIUM SERPL-MCNC: 9.5 MG/DL (ref 8.5–10.5)
CHLORIDE SERPL-SCNC: 103 MMOL/L (ref 96–112)
CO2 SERPL-SCNC: 26 MMOL/L (ref 20–33)
CREAT SERPL-MCNC: 0.69 MG/DL (ref 0.5–1.4)
EOSINOPHIL # BLD AUTO: 0.09 K/UL (ref 0–0.51)
EOSINOPHIL NFR BLD: 1.6 % (ref 0–6.9)
ERYTHROCYTE [DISTWIDTH] IN BLOOD BY AUTOMATED COUNT: 45.3 FL (ref 35.9–50)
EST. AVERAGE GLUCOSE BLD GHB EST-MCNC: 108 MG/DL
FASTING STATUS PATIENT QL REPORTED: NORMAL
GLOBULIN SER CALC-MCNC: 2.3 G/DL (ref 1.9–3.5)
GLUCOSE SERPL-MCNC: 91 MG/DL (ref 65–99)
HBA1C MFR BLD: 5.4 % (ref 0–5.6)
HCT VFR BLD AUTO: 42.7 % (ref 37–47)
HGB BLD-MCNC: 14.4 G/DL (ref 12–16)
IMM GRANULOCYTES # BLD AUTO: 0.01 K/UL (ref 0–0.11)
IMM GRANULOCYTES NFR BLD AUTO: 0.2 % (ref 0–0.9)
LYMPHOCYTES # BLD AUTO: 2.27 K/UL (ref 1–4.8)
LYMPHOCYTES NFR BLD: 39.5 % (ref 22–41)
MCH RBC QN AUTO: 30.4 PG (ref 27–33)
MCHC RBC AUTO-ENTMCNC: 33.7 G/DL (ref 33.6–35)
MCV RBC AUTO: 90.1 FL (ref 81.4–97.8)
MONOCYTES # BLD AUTO: 0.33 K/UL (ref 0–0.85)
MONOCYTES NFR BLD AUTO: 5.7 % (ref 0–13.4)
NEUTROPHILS # BLD AUTO: 3.01 K/UL (ref 2–7.15)
NEUTROPHILS NFR BLD: 52.5 % (ref 44–72)
NRBC # BLD AUTO: 0 K/UL
NRBC BLD-RTO: 0 /100 WBC
PLATELET # BLD AUTO: 247 K/UL (ref 164–446)
PMV BLD AUTO: 10.6 FL (ref 9–12.9)
POTASSIUM SERPL-SCNC: 5.1 MMOL/L (ref 3.6–5.5)
PROT SERPL-MCNC: 7.2 G/DL (ref 6–8.2)
RBC # BLD AUTO: 4.74 M/UL (ref 4.2–5.4)
SODIUM SERPL-SCNC: 140 MMOL/L (ref 135–145)
TSH SERPL DL<=0.005 MIU/L-ACNC: 2.54 UIU/ML (ref 0.38–5.33)
VIT B12 SERPL-MCNC: 824 PG/ML (ref 211–911)
WBC # BLD AUTO: 5.7 K/UL (ref 4.8–10.8)

## 2020-05-15 PROCEDURE — 83036 HEMOGLOBIN GLYCOSYLATED A1C: CPT

## 2020-05-15 PROCEDURE — 36415 COLL VENOUS BLD VENIPUNCTURE: CPT

## 2020-05-15 PROCEDURE — 84443 ASSAY THYROID STIM HORMONE: CPT

## 2020-05-15 PROCEDURE — 85025 COMPLETE CBC W/AUTO DIFF WBC: CPT

## 2020-05-15 PROCEDURE — 80053 COMPREHEN METABOLIC PANEL: CPT

## 2020-05-15 PROCEDURE — 82607 VITAMIN B-12: CPT

## 2020-05-19 ENCOUNTER — HOSPITAL ENCOUNTER (OUTPATIENT)
Dept: RADIOLOGY | Facility: MEDICAL CENTER | Age: 60
End: 2020-05-19
Attending: INTERNAL MEDICINE
Payer: COMMERCIAL

## 2020-05-19 DIAGNOSIS — R59.0 MEDIASTINAL ADENOPATHY: ICD-10-CM

## 2020-05-19 DIAGNOSIS — R59.0 LOCALIZED ENLARGED LYMPH NODES: ICD-10-CM

## 2020-05-19 PROCEDURE — 71260 CT THORAX DX C+: CPT

## 2020-05-19 PROCEDURE — 700117 HCHG RX CONTRAST REV CODE 255: Performed by: INTERNAL MEDICINE

## 2020-05-19 RX ADMIN — IOHEXOL 75 ML: 350 INJECTION, SOLUTION INTRAVENOUS at 11:30

## 2020-05-26 ENCOUNTER — HOSPITAL ENCOUNTER (OUTPATIENT)
Dept: RADIOLOGY | Facility: MEDICAL CENTER | Age: 60
End: 2020-05-26
Attending: INTERNAL MEDICINE
Payer: COMMERCIAL

## 2020-05-26 DIAGNOSIS — Z12.31 ENCOUNTER FOR SCREENING MAMMOGRAM FOR MALIGNANT NEOPLASM OF BREAST: ICD-10-CM

## 2020-05-26 DIAGNOSIS — R92.30 DENSE BREAST: ICD-10-CM

## 2020-05-26 DIAGNOSIS — R92.2 DENSE BREAST: ICD-10-CM

## 2020-05-26 PROCEDURE — 76641 ULTRASOUND BREAST COMPLETE: CPT

## 2020-05-26 PROCEDURE — 77067 SCR MAMMO BI INCL CAD: CPT

## 2020-06-03 ENCOUNTER — APPOINTMENT (OUTPATIENT)
Dept: SLEEP MEDICINE | Facility: MEDICAL CENTER | Age: 60
End: 2020-06-03
Attending: INTERNAL MEDICINE
Payer: COMMERCIAL

## 2020-06-03 DIAGNOSIS — G47.33 OBSTRUCTIVE SLEEP APNEA: ICD-10-CM

## 2020-06-11 ENCOUNTER — PATIENT MESSAGE (OUTPATIENT)
Dept: MEDICAL GROUP | Facility: MEDICAL CENTER | Age: 60
End: 2020-06-11

## 2020-06-11 ENCOUNTER — TELEPHONE (OUTPATIENT)
Dept: MEDICAL GROUP | Facility: MEDICAL CENTER | Age: 60
End: 2020-06-11

## 2020-06-11 NOTE — TELEPHONE ENCOUNTER
1. Name: Danii Rodriguez      Call Back Number: [phone] (home) [phone] (work)        How would the patient prefer to be contacted with a response: phone    Pt Danii called in left voice mail saying she knows Dr. Carrion is out of the office however she has been waiting for her Sleep test results and she needs them. If someone can call her by tomorrow and give her those. Or what she needs to do to get them.     Please and Thank you

## 2020-06-12 NOTE — TELEPHONE ENCOUNTER
Phone Number Called: [phone] (home) [phone] (work)      Call outcome: Spoke to patient regarding message below.    Message: Called and spoke with sleep study center, they have not resulted it yet. Informed pt that its not resulted yet and will update her when it comes in.

## 2020-06-12 NOTE — TELEPHONE ENCOUNTER
I cannot locate them in media, please have the patient contact [facility], it looks like the order for sleep lab study was placed by Dr. English and was performed on 6/3/20. The results will be coming from Dr. English.

## 2020-07-02 ENCOUNTER — APPOINTMENT (OUTPATIENT)
Dept: SLEEP MEDICINE | Facility: MEDICAL CENTER | Age: 60
End: 2020-07-02
Payer: COMMERCIAL

## 2020-07-07 ENCOUNTER — APPOINTMENT (RX ONLY)
Dept: URBAN - METROPOLITAN AREA CLINIC 20 | Facility: CLINIC | Age: 60
Setting detail: DERMATOLOGY
End: 2020-07-07

## 2020-07-07 DIAGNOSIS — G47.30 SLEEP APNEA, UNSPECIFIED TYPE: ICD-10-CM

## 2020-07-07 DIAGNOSIS — L71.0 PERIORAL DERMATITIS: ICD-10-CM

## 2020-07-07 DIAGNOSIS — L85.3 XEROSIS CUTIS: ICD-10-CM

## 2020-07-07 PROCEDURE — ? COUNSELING

## 2020-07-07 PROCEDURE — ? ADDITIONAL NOTES

## 2020-07-07 PROCEDURE — 99213 OFFICE O/P EST LOW 20 MIN: CPT

## 2020-07-07 PROCEDURE — ? PRESCRIPTION

## 2020-07-07 RX ORDER — TRIAMCINOLONE ACETONIDE 1 MG/G
OINTMENT TOPICAL
Qty: 1 | Refills: 3 | Status: ERX | COMMUNITY
Start: 2020-07-07

## 2020-07-07 RX ADMIN — TRIAMCINOLONE ACETONIDE: 1 OINTMENT TOPICAL at 00:00

## 2020-07-07 ASSESSMENT — LOCATION DETAILED DESCRIPTION DERM
LOCATION DETAILED: RIGHT NASOLABIAL FOLD
LOCATION DETAILED: RIGHT DISTAL PRETIBIAL REGION
LOCATION DETAILED: LEFT PROXIMAL PRETIBIAL REGION
LOCATION DETAILED: LEFT ANTERIOR DISTAL THIGH
LOCATION DETAILED: LEFT INFERIOR MEDIAL MALAR CHEEK
LOCATION DETAILED: LEFT DISTAL PRETIBIAL REGION
LOCATION DETAILED: RIGHT ANTERIOR DISTAL THIGH

## 2020-07-07 ASSESSMENT — LOCATION ZONE DERM
LOCATION ZONE: LEG
LOCATION ZONE: LIP
LOCATION ZONE: FACE

## 2020-07-07 ASSESSMENT — LOCATION SIMPLE DESCRIPTION DERM
LOCATION SIMPLE: LEFT THIGH
LOCATION SIMPLE: RIGHT LIP
LOCATION SIMPLE: LEFT CHEEK
LOCATION SIMPLE: LEFT PRETIBIAL REGION
LOCATION SIMPLE: RIGHT PRETIBIAL REGION
LOCATION SIMPLE: RIGHT THIGH

## 2020-07-07 NOTE — PROGRESS NOTES
Patient failed 3 home studies.  Per Danii need in-lab study.  Can you please place an order for an in-lab?

## 2020-07-07 NOTE — PROCEDURE: ADDITIONAL NOTES
Detail Level: Simple
Additional Notes: Improved\\nPatient notices flaring with certain cosmetics\\nContinuing metronidazole topical - does not need a refill at this time.\\nContinue Cerave and other gentle cleansers\\nContinue to avoid hydrocortisone.
Additional Notes: Patient has tingling/burning of her feet and then itching of her calves. No skin changes to feet - advise patient to discuss these symptoms with her PCP as they seem like neuropathy, r/o DM, MS, etc. Patient has an upcoming appointment.\\nCan try trial of triamcinolone BID to affected areas on calves/shins, would avoid application to feet\\nGentle skin care

## 2020-07-22 ENCOUNTER — OFFICE VISIT (OUTPATIENT)
Dept: ADMISSIONS | Facility: MEDICAL CENTER | Age: 60
End: 2020-07-22
Attending: ORTHOPAEDIC SURGERY
Payer: COMMERCIAL

## 2020-07-22 DIAGNOSIS — Z01.812 PRE-OPERATIVE LABORATORY EXAMINATION: ICD-10-CM

## 2020-07-22 DIAGNOSIS — Z01.810 PRE-OPERATIVE CARDIOVASCULAR EXAMINATION: ICD-10-CM

## 2020-07-22 LAB
ANION GAP SERPL CALC-SCNC: 11 MMOL/L (ref 7–16)
BUN SERPL-MCNC: 15 MG/DL (ref 8–22)
CALCIUM SERPL-MCNC: 9.7 MG/DL (ref 8.4–10.2)
CHLORIDE SERPL-SCNC: 104 MMOL/L (ref 96–112)
CO2 SERPL-SCNC: 24 MMOL/L (ref 20–33)
COVID ORDER STATUS COVID19: NORMAL
CREAT SERPL-MCNC: 0.77 MG/DL (ref 0.5–1.4)
GLUCOSE SERPL-MCNC: 80 MG/DL (ref 65–99)
POTASSIUM SERPL-SCNC: 4.8 MMOL/L (ref 3.6–5.5)
SARS-COV-2 RNA RESP QL NAA+PROBE: NOTDETECTED
SODIUM SERPL-SCNC: 139 MMOL/L (ref 135–145)
SPECIMEN SOURCE: NORMAL

## 2020-07-22 PROCEDURE — 93005 ELECTROCARDIOGRAM TRACING: CPT

## 2020-07-22 PROCEDURE — U0003 INFECTIOUS AGENT DETECTION BY NUCLEIC ACID (DNA OR RNA); SEVERE ACUTE RESPIRATORY SYNDROME CORONAVIRUS 2 (SARS-COV-2) (CORONAVIRUS DISEASE [COVID-19]), AMPLIFIED PROBE TECHNIQUE, MAKING USE OF HIGH THROUGHPUT TECHNOLOGIES AS DESCRIBED BY CMS-2020-01-R: HCPCS

## 2020-07-22 PROCEDURE — 93010 ELECTROCARDIOGRAM REPORT: CPT | Performed by: INTERNAL MEDICINE

## 2020-07-22 PROCEDURE — 36415 COLL VENOUS BLD VENIPUNCTURE: CPT

## 2020-07-22 PROCEDURE — 80048 BASIC METABOLIC PNL TOTAL CA: CPT

## 2020-07-22 RX ORDER — IBUPROFEN 200 MG
600 TABLET ORAL DAILY
COMMUNITY
End: 2022-11-30

## 2020-07-22 RX ORDER — TRIAMCINOLONE ACETONIDE 1 MG/G
OINTMENT TOPICAL
COMMUNITY
Start: 2020-07-07 | End: 2021-03-25

## 2020-07-22 ASSESSMENT — FIBROSIS 4 INDEX: FIB4 SCORE: 1.14

## 2020-07-22 NOTE — OR NURSING
" Reviewed \"Preparing for your procedure\" verbally and copy given to pt. Also given \"fasting\", Pain management\", \"Patient Safety\", \"Speak-up\" handouts. Stated no further questions for surgeon on consent and pointed to surgical site listed on consent. States has had two sleep apnea tests but has to have a third. Believes she has sleep apnea. No cpap. 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 Reiterated isolation importance and given isolation guideline sheet and symptom sheet.   "

## 2020-07-23 LAB — EKG IMPRESSION: NORMAL

## 2020-07-28 ENCOUNTER — HOSPITAL ENCOUNTER (OUTPATIENT)
Facility: MEDICAL CENTER | Age: 60
End: 2020-07-28
Attending: ORTHOPAEDIC SURGERY | Admitting: ORTHOPAEDIC SURGERY
Payer: COMMERCIAL

## 2020-07-28 ENCOUNTER — ANESTHESIA (OUTPATIENT)
Dept: SURGERY | Facility: MEDICAL CENTER | Age: 60
End: 2020-07-28
Payer: COMMERCIAL

## 2020-07-28 ENCOUNTER — APPOINTMENT (OUTPATIENT)
Dept: RADIOLOGY | Facility: MEDICAL CENTER | Age: 60
End: 2020-07-28
Attending: ORTHOPAEDIC SURGERY
Payer: COMMERCIAL

## 2020-07-28 ENCOUNTER — ANESTHESIA EVENT (OUTPATIENT)
Dept: SURGERY | Facility: MEDICAL CENTER | Age: 60
End: 2020-07-28
Payer: COMMERCIAL

## 2020-07-28 VITALS
BODY MASS INDEX: 23.55 KG/M2 | HEIGHT: 63 IN | DIASTOLIC BLOOD PRESSURE: 77 MMHG | SYSTOLIC BLOOD PRESSURE: 127 MMHG | RESPIRATION RATE: 16 BRPM | TEMPERATURE: 96.8 F | WEIGHT: 132.94 LBS | HEART RATE: 66 BPM | OXYGEN SATURATION: 94 %

## 2020-07-28 PROBLEM — M18.12 ARTHRITIS OF CARPOMETACARPAL (CMC) JOINT OF LEFT THUMB: Status: ACTIVE | Noted: 2020-07-28

## 2020-07-28 PROCEDURE — 700101 HCHG RX REV CODE 250: Performed by: ANESTHESIOLOGY

## 2020-07-28 PROCEDURE — 700111 HCHG RX REV CODE 636 W/ 250 OVERRIDE (IP): Performed by: ORTHOPAEDIC SURGERY

## 2020-07-28 PROCEDURE — 700102 HCHG RX REV CODE 250 W/ 637 OVERRIDE(OP): Performed by: ANESTHESIOLOGY

## 2020-07-28 PROCEDURE — 160035 HCHG PACU - 1ST 60 MINS PHASE I: Performed by: ORTHOPAEDIC SURGERY

## 2020-07-28 PROCEDURE — 160028 HCHG SURGERY MINUTES - 1ST 30 MINS LEVEL 3: Performed by: ORTHOPAEDIC SURGERY

## 2020-07-28 PROCEDURE — 700111 HCHG RX REV CODE 636 W/ 250 OVERRIDE (IP): Performed by: ANESTHESIOLOGY

## 2020-07-28 PROCEDURE — C1713 ANCHOR/SCREW BN/BN,TIS/BN: HCPCS | Performed by: ORTHOPAEDIC SURGERY

## 2020-07-28 PROCEDURE — 502576 HCHG PACK, HAND: Performed by: ORTHOPAEDIC SURGERY

## 2020-07-28 PROCEDURE — A9270 NON-COVERED ITEM OR SERVICE: HCPCS | Performed by: ORTHOPAEDIC SURGERY

## 2020-07-28 PROCEDURE — 700102 HCHG RX REV CODE 250 W/ 637 OVERRIDE(OP): Performed by: ORTHOPAEDIC SURGERY

## 2020-07-28 PROCEDURE — 700105 HCHG RX REV CODE 258: Performed by: ORTHOPAEDIC SURGERY

## 2020-07-28 PROCEDURE — 160002 HCHG RECOVERY MINUTES (STAT): Performed by: ORTHOPAEDIC SURGERY

## 2020-07-28 PROCEDURE — 160025 RECOVERY II MINUTES (STATS): Performed by: ORTHOPAEDIC SURGERY

## 2020-07-28 PROCEDURE — A4565 SLINGS: HCPCS | Performed by: ORTHOPAEDIC SURGERY

## 2020-07-28 PROCEDURE — 160048 HCHG OR STATISTICAL LEVEL 1-5: Performed by: ORTHOPAEDIC SURGERY

## 2020-07-28 PROCEDURE — 160036 HCHG PACU - EA ADDL 30 MINS PHASE I: Performed by: ORTHOPAEDIC SURGERY

## 2020-07-28 PROCEDURE — 160009 HCHG ANES TIME/MIN: Performed by: ORTHOPAEDIC SURGERY

## 2020-07-28 PROCEDURE — A9270 NON-COVERED ITEM OR SERVICE: HCPCS | Performed by: ANESTHESIOLOGY

## 2020-07-28 PROCEDURE — 700101 HCHG RX REV CODE 250: Performed by: ORTHOPAEDIC SURGERY

## 2020-07-28 PROCEDURE — 160046 HCHG PACU - 1ST 60 MINS PHASE II: Performed by: ORTHOPAEDIC SURGERY

## 2020-07-28 PROCEDURE — 501838 HCHG SUTURE GENERAL: Performed by: ORTHOPAEDIC SURGERY

## 2020-07-28 PROCEDURE — 160039 HCHG SURGERY MINUTES - EA ADDL 1 MIN LEVEL 3: Performed by: ORTHOPAEDIC SURGERY

## 2020-07-28 DEVICE — SUTURE ANCHOR TWINFIX 2.8 WITH NEEDLES SMALL JOINT: Type: IMPLANTABLE DEVICE | Site: FINGER | Status: FUNCTIONAL

## 2020-07-28 RX ORDER — HYDROMORPHONE HYDROCHLORIDE 1 MG/ML
0.2 INJECTION, SOLUTION INTRAMUSCULAR; INTRAVENOUS; SUBCUTANEOUS
Status: DISCONTINUED | OUTPATIENT
Start: 2020-07-28 | End: 2020-07-28 | Stop reason: HOSPADM

## 2020-07-28 RX ORDER — LIDOCAINE HYDROCHLORIDE 20 MG/ML
INJECTION, SOLUTION EPIDURAL; INFILTRATION; INTRACAUDAL; PERINEURAL PRN
Status: DISCONTINUED | OUTPATIENT
Start: 2020-07-28 | End: 2020-07-28 | Stop reason: HOSPADM

## 2020-07-28 RX ORDER — ONDANSETRON 2 MG/ML
INJECTION INTRAMUSCULAR; INTRAVENOUS PRN
Status: DISCONTINUED | OUTPATIENT
Start: 2020-07-28 | End: 2020-07-28 | Stop reason: SURG

## 2020-07-28 RX ORDER — OXYCODONE HCL 5 MG/5 ML
5 SOLUTION, ORAL ORAL
Status: COMPLETED | OUTPATIENT
Start: 2020-07-28 | End: 2020-07-28

## 2020-07-28 RX ORDER — BETAMETHASONE SODIUM PHOSPHATE AND BETAMETHASONE ACETATE 3; 3 MG/ML; MG/ML
INJECTION, SUSPENSION INTRA-ARTICULAR; INTRALESIONAL; INTRAMUSCULAR; SOFT TISSUE
Status: DISCONTINUED | OUTPATIENT
Start: 2020-07-28 | End: 2020-07-28 | Stop reason: HOSPADM

## 2020-07-28 RX ORDER — MIDAZOLAM HYDROCHLORIDE 1 MG/ML
1 INJECTION INTRAMUSCULAR; INTRAVENOUS
Status: DISCONTINUED | OUTPATIENT
Start: 2020-07-28 | End: 2020-07-28 | Stop reason: HOSPADM

## 2020-07-28 RX ORDER — OXYCODONE HCL 5 MG/5 ML
10 SOLUTION, ORAL ORAL
Status: COMPLETED | OUTPATIENT
Start: 2020-07-28 | End: 2020-07-28

## 2020-07-28 RX ORDER — HYDROMORPHONE HYDROCHLORIDE 1 MG/ML
0.4 INJECTION, SOLUTION INTRAMUSCULAR; INTRAVENOUS; SUBCUTANEOUS
Status: DISCONTINUED | OUTPATIENT
Start: 2020-07-28 | End: 2020-07-28 | Stop reason: HOSPADM

## 2020-07-28 RX ORDER — BUPIVACAINE HYDROCHLORIDE 5 MG/ML
INJECTION, SOLUTION EPIDURAL; INTRACAUDAL
Status: DISCONTINUED | OUTPATIENT
Start: 2020-07-28 | End: 2020-07-28 | Stop reason: HOSPADM

## 2020-07-28 RX ORDER — HALOPERIDOL 5 MG/ML
1 INJECTION INTRAMUSCULAR
Status: DISCONTINUED | OUTPATIENT
Start: 2020-07-28 | End: 2020-07-28 | Stop reason: HOSPADM

## 2020-07-28 RX ORDER — DEXAMETHASONE SODIUM PHOSPHATE 4 MG/ML
INJECTION, SOLUTION INTRA-ARTICULAR; INTRALESIONAL; INTRAMUSCULAR; INTRAVENOUS; SOFT TISSUE PRN
Status: DISCONTINUED | OUTPATIENT
Start: 2020-07-28 | End: 2020-07-28 | Stop reason: SURG

## 2020-07-28 RX ORDER — SODIUM CHLORIDE, SODIUM LACTATE, POTASSIUM CHLORIDE, CALCIUM CHLORIDE 600; 310; 30; 20 MG/100ML; MG/100ML; MG/100ML; MG/100ML
INJECTION, SOLUTION INTRAVENOUS CONTINUOUS
Status: DISCONTINUED | OUTPATIENT
Start: 2020-07-28 | End: 2020-07-28 | Stop reason: HOSPADM

## 2020-07-28 RX ORDER — DIPHENHYDRAMINE HYDROCHLORIDE 50 MG/ML
12.5 INJECTION INTRAMUSCULAR; INTRAVENOUS
Status: DISCONTINUED | OUTPATIENT
Start: 2020-07-28 | End: 2020-07-28 | Stop reason: HOSPADM

## 2020-07-28 RX ORDER — KETOROLAC TROMETHAMINE 30 MG/ML
INJECTION, SOLUTION INTRAMUSCULAR; INTRAVENOUS PRN
Status: DISCONTINUED | OUTPATIENT
Start: 2020-07-28 | End: 2020-07-28 | Stop reason: SURG

## 2020-07-28 RX ORDER — ONDANSETRON 2 MG/ML
4 INJECTION INTRAMUSCULAR; INTRAVENOUS
Status: COMPLETED | OUTPATIENT
Start: 2020-07-28 | End: 2020-07-28

## 2020-07-28 RX ORDER — HYDROMORPHONE HYDROCHLORIDE 1 MG/ML
0.1 INJECTION, SOLUTION INTRAMUSCULAR; INTRAVENOUS; SUBCUTANEOUS
Status: DISCONTINUED | OUTPATIENT
Start: 2020-07-28 | End: 2020-07-28 | Stop reason: HOSPADM

## 2020-07-28 RX ORDER — HYDRALAZINE HYDROCHLORIDE 20 MG/ML
5 INJECTION INTRAMUSCULAR; INTRAVENOUS
Status: DISCONTINUED | OUTPATIENT
Start: 2020-07-28 | End: 2020-07-28 | Stop reason: HOSPADM

## 2020-07-28 RX ORDER — CEFAZOLIN SODIUM 1 G/3ML
INJECTION, POWDER, FOR SOLUTION INTRAMUSCULAR; INTRAVENOUS PRN
Status: DISCONTINUED | OUTPATIENT
Start: 2020-07-28 | End: 2020-07-28 | Stop reason: SURG

## 2020-07-28 RX ORDER — MEPERIDINE HYDROCHLORIDE 25 MG/ML
12.5 INJECTION INTRAMUSCULAR; INTRAVENOUS; SUBCUTANEOUS
Status: DISCONTINUED | OUTPATIENT
Start: 2020-07-28 | End: 2020-07-28 | Stop reason: HOSPADM

## 2020-07-28 RX ADMIN — POVIDONE-IODINE 15 ML: 10 SOLUTION TOPICAL at 09:48

## 2020-07-28 RX ADMIN — MIDAZOLAM HYDROCHLORIDE 2 MG: 1 INJECTION, SOLUTION INTRAMUSCULAR; INTRAVENOUS at 10:48

## 2020-07-28 RX ADMIN — ONDANSETRON 4 MG: 2 INJECTION INTRAMUSCULAR; INTRAVENOUS at 14:18

## 2020-07-28 RX ADMIN — LIDOCAINE HYDROCHLORIDE 80 MG: 20 INJECTION, SOLUTION EPIDURAL; INFILTRATION; INTRACAUDAL; PERINEURAL at 10:53

## 2020-07-28 RX ADMIN — CEFAZOLIN 2 G: 1 INJECTION, POWDER, FOR SOLUTION INTRAVENOUS at 10:57

## 2020-07-28 RX ADMIN — ONDANSETRON 4 MG: 2 INJECTION INTRAMUSCULAR; INTRAVENOUS at 11:09

## 2020-07-28 RX ADMIN — FENTANYL CITRATE 25 MCG: 50 INJECTION, SOLUTION INTRAMUSCULAR; INTRAVENOUS at 11:09

## 2020-07-28 RX ADMIN — SODIUM CHLORIDE, POTASSIUM CHLORIDE, SODIUM LACTATE AND CALCIUM CHLORIDE: 600; 310; 30; 20 INJECTION, SOLUTION INTRAVENOUS at 09:48

## 2020-07-28 RX ADMIN — FENTANYL CITRATE 25 MCG: 50 INJECTION, SOLUTION INTRAMUSCULAR; INTRAVENOUS at 12:16

## 2020-07-28 RX ADMIN — OXYCODONE HYDROCHLORIDE 10 MG: 5 SOLUTION ORAL at 12:15

## 2020-07-28 RX ADMIN — KETOROLAC TROMETHAMINE 30 MG: 30 INJECTION, SOLUTION INTRAMUSCULAR at 11:06

## 2020-07-28 RX ADMIN — PROPOFOL 150 MG: 10 INJECTION, EMULSION INTRAVENOUS at 10:53

## 2020-07-28 RX ADMIN — DEXAMETHASONE SODIUM PHOSPHATE 4 MG: 4 INJECTION, SOLUTION INTRAMUSCULAR; INTRAVENOUS at 10:56

## 2020-07-28 RX ADMIN — FENTANYL CITRATE 50 MCG: 50 INJECTION, SOLUTION INTRAMUSCULAR; INTRAVENOUS at 10:49

## 2020-07-28 RX ADMIN — LIDOCAINE HYDROCHLORIDE 0.5 ML: 10 INJECTION, SOLUTION INFILTRATION; PERINEURAL at 09:49

## 2020-07-28 ASSESSMENT — PAIN SCALES - GENERAL: PAIN_LEVEL: 0

## 2020-07-28 ASSESSMENT — FIBROSIS 4 INDEX: FIB4 SCORE: 1.14

## 2020-07-28 NOTE — OR NURSING
1159: Patient arrived from OR via gurney. Patient sleeping on 4L mask. VSS.   1205: Patient placed on 2L mask. Awake but dosing off intermittently. Denies pain at this time.   1212: Patient reports 7/10 pain. Will medicate per emar.   1220: Patient transitioned to room air. Patient tolerating water at this time. VSS.   1225: Report handed off to JONATHON Nixon

## 2020-07-28 NOTE — ANESTHESIA TIME REPORT
Anesthesia Start and Stop Event Times     Date Time Event    7/28/2020 1037 Ready for Procedure     1048 Anesthesia Start     1201 Anesthesia Stop        Responsible Staff  07/28/20    Name Role Begin End    Donnie Orozco M.D. Anesth 1048 1201        Preop Diagnosis (Free Text):  Pre-op Diagnosis     OSTEOARTHRITIS CARPOMETACARPAL JOINT LEFT THUMB        Preop Diagnosis (Codes):    Post op Diagnosis  Osteoarthritis of carpometacarpal (CMC) joint of left thumb      Premium Reason  Non-Premium    Comments:

## 2020-07-28 NOTE — OR NURSING
1225 report from Tessy HOLT  Left hand dressing c/d/i  Fingers warm  Elevated  Ice pack applied  Medicated for pain prn 1330 pain decreased 1400 continuing to medicate for pain and nausea  Crackers given  Helps nausea per [id number] meets discharge criteria  emisis 150 cc pinkish

## 2020-07-28 NOTE — OR NURSING
1500 Pt resting on recliner, resting with hand elevated. Pt reports pain but does decline pain medication, no nausea. 1515 Pt meets criteria for discharge and is requesting to go home. Awaiting arrival of daughter to transport home. 1525 Daughter present, discharge instructions discussed and all questions answered.

## 2020-07-28 NOTE — ANESTHESIA POSTPROCEDURE EVALUATION
Patient: Danii Rodriguez    Procedure Summary     Date:  07/28/20 Room / Location:   OR 01 / SURGERY [facility]    Anesthesia Start:  1048 Anesthesia Stop:  1201    Procedures:       ARTHROPLASTY, FINGER - THUMB CARPOMETACARPAL EXCISIONAL (Left Finger)      TRANSFER, TENDON - FLEXOR CARPI RADIALIS (Left Hand)      NJECTION RIGHT THUMB AND BILATERAL CARPAL TUNNELS (Bilateral Hand) Diagnosis:  (OSTEOARTHRITIS CARPOMETACARPAL JOINT LEFT THUMB)    Surgeon:  Edgar Leblanc M.D. Responsible Provider:  Donnie Orozco M.D.    Anesthesia Type:  general ASA Status:  2          Final Anesthesia Type: general  Last vitals  BP   Blood Pressure: 134/64    Temp   36.3   Pulse   Pulse: (!) 50   Resp   20    SpO2   100 %      Anesthesia Post Evaluation    Patient location during evaluation: PACU  Patient participation: complete - patient participated  Level of consciousness: awake and alert  Pain score: 0    Airway patency: patent  Anesthetic complications: no  Cardiovascular status: hemodynamically stable  Respiratory status: acceptable  Hydration status: euvolemic    PONV: none           Nurse Pain Score: 0 (NPRS)

## 2020-07-28 NOTE — ANESTHESIA PREPROCEDURE EVALUATION
Relevant Problems   NEURO   (+) History of hypothyroidism      CARDIAC   (+) Essential hypertension      GI   (+) Gastroesophageal reflux disease without esophagitis   (+) Ulcer of esophagus without bleeding      Other   (+) Chronic fatigue   (+) Mediastinal adenopathy       Physical Exam    Airway   Mallampati: II  TM distance: >3 FB  Neck ROM: full       Cardiovascular - normal exam  Rhythm: regular  Rate: normal  (-) murmur     Dental - normal exam           Pulmonary - normal exam  Breath sounds clear to auscultation     Abdominal    Neurological - normal exam                 Anesthesia Plan    ASA 2       Plan - general       Airway plan will be LMA        Induction: intravenous    Postoperative Plan: Postoperative administration of opioids is intended.    Pertinent diagnostic labs and testing reviewed    Informed Consent:    Anesthetic plan and risks discussed with patient.    Use of blood products discussed with: patient whom consented to blood products.

## 2020-07-28 NOTE — DISCHARGE INSTRUCTIONS
ACTIVITY: Rest and take it easy for the first 24 hours.  A responsible adult is recommended to remain with you during that time.  It is normal to feel sleepy.  We encourage you to not do anything that requires balance, judgment or coordination.    MILD FLU-LIKE SYMPTOMS ARE NORMAL. YOU MAY EXPERIENCE GENERALIZED MUSCLE ACHES, THROAT IRRITATION, HEADACHE AND/OR SOME NAUSEA.    FOR 24 HOURS DO NOT:  Drive, operate machinery or run household appliances.  Drink beer or alcoholic beverages.   Make important decisions or sign legal documents.    SPECIAL INSTRUCTIONS: Ice and elevate  Non-weight bearing  Encourage moving all fingers    DIET: To avoid nausea, slowly advance diet as tolerated, avoiding spicy or greasy foods for the first day.  Add more substantial food to your diet according to your physician's instructions.  Babies can be fed formula or breast milk as soon as they are hungry.  INCREASE FLUIDS AND FIBER TO AVOID CONSTIPATION.    SURGICAL DRESSING/BATHING: Keep dressing clean dry and intact    FOLLOW-UP APPOINTMENT:  A follow-up appointment should be arranged with your doctor in ; call to schedule.    You should CALL YOUR PHYSICIAN if you develop:  Fever greater than 101 degrees F.  Pain not relieved by medication, or persistent nausea or vomiting.  Excessive bleeding (blood soaking through dressing) or unexpected drainage from the wound.  Extreme redness or swelling around the incision site, drainage of pus or foul smelling drainage.  Inability to urinate or empty your bladder within 8 hours.  Problems with breathing or chest pain.    You should call 911 if you develop problems with breathing or chest pain.  If you are unable to contact your doctor or surgical center, you should go to the nearest emergency room or urgent care center.  Physician's telephone #: [phone]    If any questions arise, call your doctor.  If your doctor is not available, please feel free to call the Surgical Center at  [phone].  The Center is open Monday through Friday from 7AM to 7PM.  You can also call the HEALTH HOTLINE open 24 hours/day, 7 days/week and speak to a nurse at [phone], or toll free at [phone].    A registered nurse may call you a few days after your surgery to see how you are doing after your procedure.    MEDICATIONS: Resume taking daily medication.  Take prescribed pain medication with food.  If no medication is prescribed, you may take non-aspirin pain medication if needed.  PAIN MEDICATION CAN BE VERY CONSTIPATING.  Take a stool softener or laxative such as senokot, pericolace, or milk of magnesia if needed.    Prescription given for Oxycodone  Last pain medication given at 1215    If your physician has prescribed pain medication that includes Acetaminophen (Tylenol), do not take additional Acetaminophen (Tylenol) while taking the prescribed medication.    Depression / Suicide Risk    As you are discharged from this [facility] facility, it is important to learn how to keep safe from harming yourself.    Recognize the warning signs:  · Abrupt changes in personality, positive or negative- including increase in energy   · Giving away possessions  · Change in eating patterns- significant weight changes-  positive or negative  · Change in sleeping patterns- unable to sleep or sleeping all the time   · Unwillingness or inability to communicate  · Depression  · Unusual sadness, discouragement and loneliness  · Talk of wanting to die  · Neglect of personal appearance   · Rebelliousness- reckless behavior  · Withdrawal from people/activities they love  · Confusion- inability to concentrate     If you or a loved one observes any of these behaviors or has concerns about self-harm, here's what you can do:  · Talk about it- your feelings and reasons for harming yourself  · Remove any means that you might use to hurt yourself (examples: pills, rope, extension cords, firearm)  · Get professional help  from the community (Mental Health, Substance Abuse, psychological counseling)  · Do not be alone:Call your Safe Contact- someone whom you trust who will be there for you.  · Call your local CRISIS HOTLINE 857-5779 or 049-400-1103  · Call your local Children's Mobile Crisis Response Team [place] (240) 189-5360 or www.PerfectPost  · Call the toll free National Suicide Prevention Hotlines   · National Suicide Prevention Lifeline 810-013-QSMF (9941)  [facility] Line Network 800-SUICIDE (334-8430)Discharge Education for patients on NICOLÁS (Obstructive Sleep Apnea) Protocol    Prior to receiving sedation or anesthesia, we screen all patients for Obstructive Sleep Apnea.  During your screening, you were identified as having suspected, but not confirmed Obstructive Sleep Apnea(INCOLÁS).    What is Obstructive Sleep Apnea?  Sleep apnea (AP-ne-ah) is a common disorder which involves breathing pauses that occur during sleep.  These can last from 10 seconds to a minute or longer.  Normal breathing resumes often with a loud snort or choking sound.    Sleep apnea occurs in all age groups and both genders but is more common in men and people over 40 years of age.  It has been estimated that as many as 18 million Americans have sleep apnea.  Most people who have sleep apnea don’t know they have it because it only occurs during sleep.  A family member and/or bed partner may first notice the signs of sleep apnea.  Sleep apnea is a chronic (ongoing) condition that disrupts the quality and quantity of your sleep repeatedly throughout the night.  This often results in excessive daytime sleepiness or fatigue during the day.  It may also contribute to high blood pressure, heart problems, and complications following medications used for surgery and procedures.    To establish a definitive diagnosis, further testing from a specialist would be needed.  We recommend that you follow up with your primary care physician.    We recommend  that you should be with an adult observer for at least 24 hours after your sedation/anesthesia.  If you have a CPAP machine, you should wear it during any sleep period (day or night) for the week following your procedure.  We encourage you to sleep on your side or in a sitting position, even with napping.  Lying flat on your back increases the risk of apnea and airway obstruction during your post procedure recovery period.    It is important to prevent over-sedation that could increase your risk for apnea.  Please take all pain medication as directed by your physician.  If you are not getting pain relief, please contact your physician to discuss possible approaches to relieving pain while minimizing medications that can affect your breathing and oxygen levels.      ·

## 2020-07-28 NOTE — OR SURGEON
Immediate Post OP Note    PreOp Diagnosis: B/L cmc oa and cts    PostOp Diagnosis: same    Procedure(s):  ARTHROPLASTY, FINGER - THUMB CARPOMETACARPAL EXCISIONAL - Wound Class: Clean  TRANSFER, TENDON - FLEXOR CARPI RADIALIS - Wound Class: Clean  Inject R thumb and b/l ct    Surgeon(s):  Edgar Leblanc M.D.    Anesthesiologist/Type of Anesthesia:  Anesthesiologist: Donnie Orozco M.D./General    Surgical Staff:  Circulator: Nena Noble R.N.  Scrub Person: Genesis Lopez    Specimens removed if any:  * No specimens in log *    Estimated Blood Loss: min    Findings: oa    Complications: none        7/28/2020 10:44 AM Edgar Leblanc M.D.

## 2020-07-28 NOTE — ANESTHESIA PROCEDURE NOTES
Airway    Date/Time: 7/28/2020 10:54 AM  Performed by: Donnie Orozco M.D.  Authorized by: Donnie Orozco M.D.     Location:  OR  Urgency:  Elective  Indications for Airway Management:  Anesthesia      Spontaneous Ventilation: absent    Sedation Level:  Deep  Preoxygenated: Yes    Final Airway Type:  Supraglottic airway  Final Supraglottic Airway:  Standard LMA    SGA Size:  4  Number of Attempts at Approach:  1

## 2020-07-29 NOTE — OP REPORT
DATE OF SERVICE:  07/28/2020    PREOPERATIVE DIAGNOSES:  1.  Bilateral carpometacarpal arthritis.  2.  Bilateral carpal tunnel syndrome.    POSTOPERATIVE DIAGNOSES:  1.  Bilateral carpometacarpal arthritis.  2.  Bilateral carpal tunnel syndrome.    PROCEDURES:  1.  Left thumb carpometacarpal joint arthroplasty, trapezial excision.  2.  FCR tendon transfer interposition.  3.  Injection right carpal tunnel under anesthesia.  4.  Injection left carpal tunnel under anesthesia.  5.  Injection right CMC joint under anesthesia.    SURGEON:  Edgar Leblanc MD    ANESTHESIA:  General.    ESTIMATED BLOOD LOSS:  Minimal.    DRAINS:  None.    COMPLICATIONS:  None.    INDICATIONS:  Patient is a female, who has end-stage degenerative changes of   the thumb, wishes to proceed with surgery.  Risks, benefits, complications,   and alternatives were explained to the patient.  All questions were answered.    No guarantees were given.  Signed consent was obtained.    PROCEDURE:  Patient was taken to the operating room, laid supine on the   operating table.  All bony prominences well padded.  Good general was obtained   without difficulty.  Left upper extremity was prepped and draped in usual   sterile fashion using a ChloraPrep.  Limb was exsanguinated with elevation.    Tourniquet was raised.  Total tourniquet time was under an hour.  While we   were prepping the right side, I injected the right carpal canal with 2 mL of   Celestone, 1 mL of local.  I injected the right CMC joint with 1 mL of   Celestone, 0.5 mL of local.  I then proceeded with the left side.  I injected   the left carpal canal with 2 mL of Celestone, 1 mL of local.  We did the thumb   arthroplasty, made a curved incision around the thenar muscle of the FCR   sheath.  Full-thickness muscle was elevated off the bone.  There was a lot of   fluid, degenerative changes within the joint.    I removed the trapezium in its entirety in a piecemeal fashion.  Prepared the    base of the thumb metacarpal to bur down to cancellous bone to try and prevent   collapse.  I went proximal on the forearm, identified the FCR tendon,   transected it, pulled it out the distal wound.  The proximal wound was   irrigated, closed with nylon.  I freed it down to its insertion on the base of   the second metacarpal.  I then sutured it to the cancellous bone using the   previously placed anchors.  The remainder of the FCR tendon was rolled, used   as an interpositional spacer.  It was then secured with the anchor sutures and   Vicryl.  Thenar muscles closed with Vicryl, skin with nylon.  It was viewed   under fluoroscopy, found to be a nice suspension plasty, stable to   ballottement.  Local without epinephrine was injected.  Sterile dressing of   Xeroform, 4x4s, Sof-Rol, and thumb spica splint was applied.  All needle,   sponge counts were correct.  Patient tolerated the procedure well and was   transferred to recovery room in stable condition.       ____________________________________     MD JAI WETZEL / RACHAEL    DD:  07/29/2020 06:53:20  DT:  07/29/2020 08:06:48    D#:  [id number]  Job#:  [id number]

## 2020-08-19 ENCOUNTER — SLEEP STUDY (OUTPATIENT)
Dept: SLEEP MEDICINE | Facility: MEDICAL CENTER | Age: 60
End: 2020-08-19
Attending: INTERNAL MEDICINE
Payer: COMMERCIAL

## 2020-08-19 DIAGNOSIS — G47.30 SLEEP APNEA, UNSPECIFIED TYPE: ICD-10-CM

## 2020-08-20 NOTE — PROCEDURES
Comments:  The patient underwent a diagnostic polysomnogram using the standard montage for measurement of parameters of sleep, respiratory events, movement abnormalities, and heart rate and rhythm.   A microphone was used to monitor snoring.    Interpretation:  Study start time was 10:11:02 PM. Diagnostic recording time was 7h 55.5m with a total sleep time of 6h 10.5m resulting in a sleep efficiency of 77.92%%.   Sleep latency from the start of the study was 05 minutes and the latency from sleep to REM was 125 minutes.  In total,216 arousals were scored for an arousal index of 35.0.  Respiratory:  There were a total of 0 apneas consisting of 0 obstructive apneas, 0 mixed apneas, and 0 central apneas. A total of 43 hypopneas were scored.  The apnea index was 0.00 per hour and the hypopnea index was 6.96 per hour resulting in an overall AHI of 6.96.  AHI during rem was 11.5 and AHI while supine was 10.15.  Oximetry:  There was a mean oxygen saturation of 95.0% with a minimum oxygen saturation of 88.0%. Time spent with oxygen saturations below 89% was 4.1 minutes.  Cardiac:  The highest heart rate seen while awake was 86 BPM while the highest heart rate during sleep was 78 BPM with an average sleeping heart rate of 58 BPM.  Limb Movements:  There were a total of 253 PLMs during sleep, of which 99 were PLMS arousals. This resulted in a PLMS index of 41.0 and a PLMS arousal index of 16.0.      Interpretation:    Ms. Rodriguez presented with snoring and excessive daytime somnolence.  This is a diagnostic study.    There is fragmentation of sleep with a marked increase in wake after sleep onset time and an elevated arousal and awakening index but 58 min. of REM sleep time included.  There are frequent periodic limb movements and the periodic limb movement with arousal index is 17.7 events per hour.    The apnea hypopnea index is 7 events per hour, consisting exclusively of hypopnea events.  The index is 10.1 events per hour  in supine sleep and 11.5 events per hour in REM sleep.  The lowest arterial oxygen saturation is 89% on room air.  The heart rate varies from 50-78 bpm.  No audible snoring was noted.    Assessment:  Mild obstructive sleep apnea hypopnea with an apnea hypopnea index of 7 events per hour and a lowest arterial oxygen saturation of 89% on room air.  There is fragmentation of sleep related to the breathing events, to periodic limb movements, and perhaps to laboratory effect as well.    Recommendations:  Clinical correlation is required.  As the patient presented with daytime somnolence, treatment may be indicated.  CPAP therapy could be initiated through a titration polysomnogram or with the use of auto-titrating CPAP. Alternative treatments for sleep-disordered breathing include reconstructive otolaryngologic surgery, dental appliances and weight loss. If any these latter treatment options are selected, a follow-up polysomnogram is suggested to assess the efficacy of therapy. Behavioral measures including avoidance of sedatives, alcohol and supine body position may be of additional benefit. Patients with severe sleep apnea are typically advised not to drive a motor vehicle or operate hazardous machinery until their daytime somnolence has been corrected.

## 2020-08-25 PROCEDURE — 95810 POLYSOM 6/> YRS 4/> PARAM: CPT | Performed by: INTERNAL MEDICINE

## 2020-09-14 ENCOUNTER — TELEPHONE (OUTPATIENT)
Dept: SLEEP MEDICINE | Facility: MEDICAL CENTER | Age: 60
End: 2020-09-14

## 2020-09-14 NOTE — TELEPHONE ENCOUNTER
Pt called stating that she received a call from our clinic but no VM was left and was wondering the reason for the call. I informed the pt that we got a response from Dr. Pierson regarding the pt wanting the result of the SS she completed on 8/19/2020. Per Dr. Pierson Best way would be to schedule a f/u appointment.  I informed the pt of this and was able to schedule the pt on 10/13/2020 @ 11:00 am with Xavi. I also sent the pt a mychart with the apt details

## 2020-10-13 ENCOUNTER — TELEMEDICINE (OUTPATIENT)
Dept: SLEEP MEDICINE | Facility: MEDICAL CENTER | Age: 60
End: 2020-10-13
Payer: COMMERCIAL

## 2020-10-13 VITALS — WEIGHT: 138 LBS | HEIGHT: 63 IN | BODY MASS INDEX: 24.45 KG/M2

## 2020-10-13 DIAGNOSIS — I10 ESSENTIAL HYPERTENSION: ICD-10-CM

## 2020-10-13 DIAGNOSIS — G47.33 OBSTRUCTIVE SLEEP APNEA: ICD-10-CM

## 2020-10-13 PROCEDURE — 99212 OFFICE O/P EST SF 10 MIN: CPT | Mod: 95,CR | Performed by: NURSE PRACTITIONER

## 2020-10-13 ASSESSMENT — FIBROSIS 4 INDEX: FIB4 SCORE: 1.14

## 2020-10-13 NOTE — PROGRESS NOTES
Virtual Visit: Established Patient   This visit was conducted via Zoom using secure and encrypted videoconferencing technology. The patient was in a private location in the state of [place].    The patient's identity was confirmed and verbal consent was obtained for this virtual visit.    Subjective:   CC:   Chief Complaint   Patient presents with   • Results     SS       Danii Rodriguez is a 59 y.o. female presenting for evaluation and management of:    Sleep study results; see Dr. English's dictation 5/10/2019 for full history.  Sleep symptoms are AM headaches and fatigue.  PSG 8/19/2020 indicated mild sleep apnea with mild oxygen desaturations.  Sleep efficiency of 78%. Overall AHI of 7/h with O2 addy of 89%.  There was fragmentation of sleep-related breathing events, periodic limb movements and possible laboratory effect.  Heart rate varied between 50 to 70 bpm.  No audible snoring noted.  Reviewed finds with patient and treatment modalities.  She is using a mouth device that pulls her jaw forward and had it made by Dr. Ascencio. She has found no subjective benefit with use of device for 2 weeks. She has a hx of mastoid surgery in 2010-11 due to infection and she now avoids sleeping on left side due to discomfort.     History of hypertension and Dewitt's esophagus. She is getting maintenance allergy injections currently and notes allergic to everything but controlled.    ROS in HPI; otherwise negative.      Allergies   Allergen Reactions   • Ciprofloxacin Hives and Nausea     Okay to take cipro ear drops, no reaction with ear drop   • Hydrocodone Rash     RASH     • Sulfa Drugs Rash and Itching     Not had but was told not to use   • Latex Rash and Itching     Tape burns and rash- paper tape OK   • Other Environmental Itching     seasonal   • Statins [Hmg-Coa-R Inhibitors] Unspecified     Hypertensive Crisis       Current medicines (including changes today)  Current Outpatient Medications   Medication Sig  Dispense Refill   • ezetimibe (ZETIA) 10 MG Tab TAKE 1 TABLET BY MOUTH EVERY DAY 30 Tab 0   • triamcinolone acetonide (KENALOG) 0.1 % Ointment PLEASE SEE ATTACHED FOR DETAILED DIRECTIONS     • NON SPECIFIED Topical cream for face     • ibuprofen (MOTRIN) 200 MG Tab Take 600 mg by mouth every day.     • fluticasone (FLONASE) 50 MCG/ACT nasal spray Spray 2 Sprays in nose every morning as needed (ALLERGY).     • lisinopril (PRINIVIL) 20 MG Tab Take 20 mg by mouth every morning.     • scopolamine (TRANSDERM-SCOP) 1 mg/72hr PATCH 72 HR Apply 1 Patch to skin as directed every 3 days as needed (NAUSEA).     • NON SPECIFIED 1 Each by Injection route Q30 DAYS. SEASONAL SHOT     • Plant Sterols and Stanols (CHOLESTOFF PO) Take 1 Tab by mouth 2 Times a Day.     • coenzyme Q-10 30 MG capsule Take 30 mg by mouth every morning.     • Multiple Vitamins-Minerals (WOMENS 50+ MULTI VITAMIN/MIN PO) Take 1 Tab by mouth every morning.     • albuterol 108 (90 Base) MCG/ACT Aero Soln inhalation aerosol Inhale 2 Puffs by mouth every 6 hours as needed for Shortness of Breath.     • Probiotic Product (PROBIOTIC COLON SUPPORT PO) Take 1 Cap by mouth every morning.     • Cholecalciferol (VITAMIN D3) 2000 UNIT Cap Take 4,000 Units by mouth every day.       No current facility-administered medications for this visit.        Patient Active Problem List    Diagnosis Date Noted   • Arthritis of carpometacarpal (CMC) joint of left thumb 07/28/2020   • Statin intolerance 10/31/2019   • Stress and adjustment reaction 10/14/2019   • Transient global amnesia 10/09/2019   • Mediastinal adenopathy 10/09/2019   • Primary osteoarthritis of both hands 03/28/2019   • Primary insomnia 01/25/2018   • Hot flashes, menopausal 01/25/2018   • Vitamin D insufficiency 10/25/2017   • Chronic fatigue 09/19/2017   • Bilateral leg cramps 09/19/2017   • Essential hypertension 09/19/2017   • Muscle spasms of neck 03/23/2017   • Left elbow pain 09/30/2016   • Ulcer of  "esophagus without bleeding 2016   • Pressure sensation in left ear, left temporal area and left TM joint 2016   • History of hypothyroidism 2016   • Pure hypercholesterolemia 2016   • History of carpal tunnel repair 2014   • Seasonal allergies 2014   • Gastroesophageal reflux disease without esophagitis 2014       Family History   Problem Relation Age of Onset   • Cancer Paternal Grandmother    • Other Paternal Grandmother         lupus   • Heart Disease Paternal Grandmother          70s   • Cancer Paternal Aunt    • Other Mother         Parkinson disease   • Bipolar disorder Mother    • Hypertension Mother    • Thyroid Mother         Hypothyroid   • Heart Disease Father         Congenital heart disease, CHF   • Hypertension Father    • Diabetes Father    • Stroke Sister         age 53, smoker   • Bipolar disorder Sister    • Other Sister         alcoholic        She  has a past medical history of Allergic rhinitis, Anesthesia, Arthritis (2020), Cough, Daytime sleepiness, Eye drainage, Fatigue, Heartburn, High cholesterol, Hoarseness, persistent, Hypertension, MRSA (methicillin resistant staph aureus) culture positive (), Painful joint, Palpitations, Ringing in ears, and Wears glasses.  She  has a past surgical history that includes nasal septal reconstruction; cystoscopy; bone graft; incision and drainage ent; joint injection diagnostic (Bilateral, 2018); abdominal hysterectomy total; carpal tunnel endoscopic (2014); trigger finger release (Right, 2018); finger arthroplasty (Left, 2020); tendon transfer (Left, 2020); and cortisone injection (Bilateral, 2020).       Objective:   Ht 1.6 m (5' 3\")   Wt 62.6 kg (138 lb)   BMI 24.45 kg/m²     Physical Exam:  Constitutional: Alert, no distress, well-groomed.  Skin: No rashes in visible areas.  Eye: Round. Conjunctiva clear, lids normal. No icterus. Glasses.  ENMT: Lips pink without " lesions, good dentition, moist mucous membranes. Phonation normal.  Neck: No masses, no thyromegaly. Moves freely without pain.  Respiratory: Unlabored respiratory effort, no cough or audible wheeze  Psych: Alert and oriented x3, normal affect and mood.       Assessment and Plan:   The following treatment plan was discussed:     1. Obstructive sleep apnea    2. Essential hypertension    3. BMI 24.0-24.9, adult    Start APAP 4-8cm nightly.  Patient understands they may have mask fits within first 30 days of therapy covered by insurance to obtain best fit.  Patient understands the need to use device every night for >4hrs to meet compliance standards for insurance purposes.  Discussed sleep hygiene  F/u with PCP for other health concerns    Follow-up: 2-3 mos for 1st compliance check, sooner if needed.

## 2020-10-22 ENCOUNTER — PATIENT MESSAGE (OUTPATIENT)
Dept: MEDICAL GROUP | Facility: MEDICAL CENTER | Age: 60
End: 2020-10-22

## 2020-10-22 DIAGNOSIS — E78.5 DYSLIPIDEMIA: ICD-10-CM

## 2020-11-16 ENCOUNTER — HOSPITAL ENCOUNTER (OUTPATIENT)
Dept: LAB | Facility: MEDICAL CENTER | Age: 60
End: 2020-11-16
Attending: INTERNAL MEDICINE
Payer: COMMERCIAL

## 2020-11-16 DIAGNOSIS — E78.5 DYSLIPIDEMIA: ICD-10-CM

## 2020-11-16 LAB
ALBUMIN SERPL BCP-MCNC: 4.8 G/DL (ref 3.2–4.9)
ALBUMIN/GLOB SERPL: 2.2 G/DL
ALP SERPL-CCNC: 59 U/L (ref 30–99)
ALT SERPL-CCNC: 27 U/L (ref 2–50)
ANION GAP SERPL CALC-SCNC: 10 MMOL/L (ref 7–16)
AST SERPL-CCNC: 22 U/L (ref 12–45)
BILIRUB SERPL-MCNC: 0.5 MG/DL (ref 0.1–1.5)
BUN SERPL-MCNC: 13 MG/DL (ref 8–22)
CALCIUM SERPL-MCNC: 10 MG/DL (ref 8.5–10.5)
CHLORIDE SERPL-SCNC: 103 MMOL/L (ref 96–112)
CHOLEST SERPL-MCNC: 242 MG/DL (ref 100–199)
CO2 SERPL-SCNC: 26 MMOL/L (ref 20–33)
CREAT SERPL-MCNC: 0.62 MG/DL (ref 0.5–1.4)
FASTING STATUS PATIENT QL REPORTED: NORMAL
GLOBULIN SER CALC-MCNC: 2.2 G/DL (ref 1.9–3.5)
GLUCOSE SERPL-MCNC: 80 MG/DL (ref 65–99)
HDLC SERPL-MCNC: 79 MG/DL
LDLC SERPL CALC-MCNC: 147 MG/DL
POTASSIUM SERPL-SCNC: 4.2 MMOL/L (ref 3.6–5.5)
PROT SERPL-MCNC: 7 G/DL (ref 6–8.2)
SODIUM SERPL-SCNC: 139 MMOL/L (ref 135–145)
TRIGL SERPL-MCNC: 81 MG/DL (ref 0–149)

## 2020-11-16 PROCEDURE — 80053 COMPREHEN METABOLIC PANEL: CPT

## 2020-11-16 PROCEDURE — 36415 COLL VENOUS BLD VENIPUNCTURE: CPT

## 2020-11-16 PROCEDURE — 80061 LIPID PANEL: CPT

## 2020-11-17 DIAGNOSIS — E78.49 OTHER HYPERLIPIDEMIA: ICD-10-CM

## 2020-11-17 RX ORDER — EZETIMIBE 10 MG/1
TABLET ORAL
Qty: 90 TAB | Refills: 1 | OUTPATIENT
Start: 2020-11-17

## 2020-11-17 NOTE — TELEPHONE ENCOUNTER
Received request via: Pharmacy    Was the patient seen in the last year in this department? Yes    Patients next Appointment:  Visit date not found     Requested Prescriptions     Pending Prescriptions Disp Refills   • lisinopril (PRINIVIL) 20 MG Tab [Pharmacy Med Name: LISINOPRIL 20 MG TABLET] 90 Tab 4     Sig: TAKE 1 TABLET BY MOUTH EVERY DAY

## 2020-11-18 RX ORDER — LISINOPRIL 20 MG/1
TABLET ORAL
Qty: 90 TAB | Refills: 4 | Status: SHIPPED | OUTPATIENT
Start: 2020-11-18 | End: 2022-04-15

## 2020-12-16 ENCOUNTER — APPOINTMENT (RX ONLY)
Dept: URBAN - METROPOLITAN AREA CLINIC 20 | Facility: CLINIC | Age: 60
Setting detail: DERMATOLOGY
End: 2020-12-16

## 2020-12-16 DIAGNOSIS — D17 BENIGN LIPOMATOUS NEOPLASM: ICD-10-CM

## 2020-12-16 DIAGNOSIS — L71.0 PERIORAL DERMATITIS: ICD-10-CM | Status: IMPROVED

## 2020-12-16 PROBLEM — D17.1 BENIGN LIPOMATOUS NEOPLASM OF SKIN AND SUBCUTANEOUS TISSUE OF TRUNK: Status: ACTIVE | Noted: 2020-12-16

## 2020-12-16 PROCEDURE — ? ORDER ULTRASOUND

## 2020-12-16 PROCEDURE — ? OBSERVATION AND MEASURE

## 2020-12-16 PROCEDURE — ? COUNSELING

## 2020-12-16 PROCEDURE — 99213 OFFICE O/P EST LOW 20 MIN: CPT

## 2020-12-16 PROCEDURE — ? ADDITIONAL NOTES

## 2020-12-16 ASSESSMENT — LOCATION ZONE DERM
LOCATION ZONE: LIP
LOCATION ZONE: TRUNK
LOCATION ZONE: FACE

## 2020-12-16 ASSESSMENT — LOCATION SIMPLE DESCRIPTION DERM
LOCATION SIMPLE: LEFT CHEEK
LOCATION SIMPLE: RIGHT LIP
LOCATION SIMPLE: UPPER BACK

## 2020-12-16 ASSESSMENT — LOCATION DETAILED DESCRIPTION DERM
LOCATION DETAILED: RIGHT NASOLABIAL FOLD
LOCATION DETAILED: INFERIOR THORACIC SPINE
LOCATION DETAILED: LEFT INFERIOR MEDIAL MALAR CHEEK

## 2020-12-16 NOTE — PROCEDURE: ADDITIONAL NOTES
Detail Level: Simple
Additional Notes: Improved\\nPatient notices flaring with certain cosmetics\\nContinuing metronidazole topical - does not need a refill at this time.\\nContinue Cerave and other gentle cleansers\\nContinue to avoid hydrocortisone.
Additional Notes: Patient states she has significant back pain, wonders if this lesion is contributing\\nDiscussed that her more widespread back pain symptoms are not likely related to lipoma\\ndiscussed f/u with PCP to address this - pt will do this\\nDiscussed ultrasound- pt interested

## 2020-12-16 NOTE — PROCEDURE: ORDER ULTRASOUND
Detail Level: Simple
Priority: normal
Lesion Location: inferior thoracic spine
Provider: Annette Arrington MD
Ultrasound Protocol: Ultrasound of Subcutaneous Mass
Subcutaneous Lesion Ultrasound Reason: Subcutaneous growth 2.5 x 2 cm

## 2021-01-09 ENCOUNTER — HOSPITAL ENCOUNTER (OUTPATIENT)
Dept: RADIOLOGY | Facility: MEDICAL CENTER | Age: 61
End: 2021-01-09
Attending: DERMATOLOGY
Payer: COMMERCIAL

## 2021-01-09 DIAGNOSIS — D17.1 BENIGN LIPOMATOUS NEOPLASM OF SKIN AND SUBCUTANEOUS TISSUE OF TRUNK: ICD-10-CM

## 2021-01-09 PROCEDURE — 76705 ECHO EXAM OF ABDOMEN: CPT

## 2021-03-15 DIAGNOSIS — Z23 NEED FOR VACCINATION: ICD-10-CM

## 2021-03-18 ENCOUNTER — PATIENT MESSAGE (OUTPATIENT)
Dept: MEDICAL GROUP | Facility: MEDICAL CENTER | Age: 61
End: 2021-03-18

## 2021-03-18 DIAGNOSIS — E78.49 OTHER HYPERLIPIDEMIA: ICD-10-CM

## 2021-03-19 NOTE — PATIENT COMMUNICATION
Received request via: Patient    Was the patient seen in the last year in this department? Yes    Does the patient have an active prescription (recently filled or refills available) for medication(s) requested? No     Requested Prescriptions     Pending Prescriptions Disp Refills   • ezetimibe (ZETIA) 10 MG Tab 30 tablet 0     Sig: Take 1 tablet by mouth every day.

## 2021-03-21 RX ORDER — EZETIMIBE 10 MG/1
10 TABLET ORAL
Qty: 30 TABLET | Refills: 0 | Status: SHIPPED | OUTPATIENT
Start: 2021-03-21 | End: 2021-03-25 | Stop reason: SDUPTHER

## 2021-03-25 ENCOUNTER — OFFICE VISIT (OUTPATIENT)
Dept: MEDICAL GROUP | Facility: MEDICAL CENTER | Age: 61
End: 2021-03-25
Payer: COMMERCIAL

## 2021-03-25 VITALS
HEART RATE: 66 BPM | WEIGHT: 140.2 LBS | SYSTOLIC BLOOD PRESSURE: 120 MMHG | OXYGEN SATURATION: 100 % | HEIGHT: 63 IN | TEMPERATURE: 98.4 F | BODY MASS INDEX: 24.84 KG/M2 | DIASTOLIC BLOOD PRESSURE: 60 MMHG | RESPIRATION RATE: 17 BRPM

## 2021-03-25 DIAGNOSIS — Q21.10 ATRIAL SEPTAL DEFECT: ICD-10-CM

## 2021-03-25 DIAGNOSIS — K21.9 GASTROESOPHAGEAL REFLUX DISEASE WITHOUT ESOPHAGITIS: ICD-10-CM

## 2021-03-25 DIAGNOSIS — Z12.31 ENCOUNTER FOR SCREENING MAMMOGRAM FOR MALIGNANT NEOPLASM OF BREAST: ICD-10-CM

## 2021-03-25 DIAGNOSIS — E78.00 PURE HYPERCHOLESTEROLEMIA: ICD-10-CM

## 2021-03-25 DIAGNOSIS — R82.90 URINE ABNORMALITY: ICD-10-CM

## 2021-03-25 DIAGNOSIS — R00.2 PALPITATIONS: ICD-10-CM

## 2021-03-25 DIAGNOSIS — E78.49 OTHER HYPERLIPIDEMIA: ICD-10-CM

## 2021-03-25 DIAGNOSIS — Z86.39 HISTORY OF HYPOTHYROIDISM: ICD-10-CM

## 2021-03-25 DIAGNOSIS — I10 ESSENTIAL HYPERTENSION: ICD-10-CM

## 2021-03-25 DIAGNOSIS — M18.12 ARTHRITIS OF CARPOMETACARPAL (CMC) JOINT OF LEFT THUMB: ICD-10-CM

## 2021-03-25 DIAGNOSIS — E55.9 VITAMIN D INSUFFICIENCY: ICD-10-CM

## 2021-03-25 PROBLEM — R59.0 MEDIASTINAL ADENOPATHY: Status: RESOLVED | Noted: 2019-10-09 | Resolved: 2021-03-25

## 2021-03-25 PROBLEM — R53.82 CHRONIC FATIGUE: Status: RESOLVED | Noted: 2017-09-19 | Resolved: 2021-03-25

## 2021-03-25 PROCEDURE — 99214 OFFICE O/P EST MOD 30 MIN: CPT | Performed by: INTERNAL MEDICINE

## 2021-03-25 RX ORDER — EZETIMIBE 10 MG/1
10 TABLET ORAL
Qty: 90 TABLET | Refills: 4 | Status: SHIPPED | OUTPATIENT
Start: 2021-03-25 | End: 2021-11-16 | Stop reason: SDUPTHER

## 2021-03-25 ASSESSMENT — FIBROSIS 4 INDEX: FIB4 SCORE: 1.06

## 2021-03-25 ASSESSMENT — PATIENT HEALTH QUESTIONNAIRE - PHQ9: CLINICAL INTERPRETATION OF PHQ2 SCORE: 0

## 2021-03-26 NOTE — PROGRESS NOTES
CC:  Diagnoses of Encounter for screening mammogram for malignant neoplasm of breast, Other hyperlipidemia, Pure hypercholesterolemia, History of hypothyroidism, Essential hypertension, Vitamin D insufficiency, Gastroesophageal reflux disease without esophagitis, Urine abnormality, Arthritis of carpometacarpal (CMC) joint of left thumb, Atrial septal defect, and Palpitations were pertinent to this visit.    HISTORY OF THE PRESENT ILLNESS: Patient is a 60 y.o. female. This pleasant patient is here today for follow-up.    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  She has family member at home with Parkinson's disease.  She is agreeable to consider some of the free counseling options due to the pandemic to work on caregiver stress.     Labs 11/16/2020 reviewed normal GFR, CMP but noted lipids still elevated total .  She does not tolerate statins and her vascular specialist in the past recommended Zetia and Colestoff.  She readily admits she needs to exercise more and start being better on her diet.  No current cardiopulmonary or strokelike symptoms.      Prior echocardiogram did show atrial shunt.  Also sleep apnea per pulmonary note she was started on APAP 10/13/2020.  States she is no longer using her mask, tried many different versions of it and could not tolerate it.  Having fairly regular palpitations described as increase in her heart rate, generally at night.  Wants to see cardiology again.    Recently had surgery for arthritis of left thumb, still having some discomfort and pending follow-up with her orthopedic team.    GERD is a little less controlled she started using Prilosec daily with improvement.  No nausea, vomiting, diarrhea, constipation, change in bowel movements, rectal bleeding.  Colonoscopy is up-to-date.    Says she saw  urology [place] remotely when she was having a lot of urinary symptoms, felt like frequent UTIs but urine was normal.  She describes she had cystoscopy with a multitude of bladder polyps which were removed.  She also was told she had a curvature/bend of her urethra.  She says now she feels that she has to void strongly at night and then is not able to urinate.  No symptoms in the day.  She would like to follow-up with her urology team.    History of hypothyroidism, has been euthyroid off pharmacotherapy.  She would like to reassess this.    Blood pressure normal on lisinopril.    Allergies: Ciprofloxacin, Hydrocodone, Sulfa drugs, Latex, Other environmental, and Statins [hmg-coa-r inhibitors]    Current Outpatient Medications Ordered in Epic   Medication Sig Dispense Refill   • ezetimibe (ZETIA) 10 MG Tab Take 1 tablet by mouth every day. 90 tablet 4   • lisinopril (PRINIVIL) 20 MG Tab TAKE 1 TABLET BY MOUTH EVERY DAY 90 Tab 4   • NON SPECIFIED Topical cream for face     • ibuprofen (MOTRIN) 200 MG Tab Take 600 mg by mouth every day.     • fluticasone (FLONASE) 50 MCG/ACT nasal spray Spray 2 Sprays in nose every morning as needed (ALLERGY).     • scopolamine (TRANSDERM-SCOP) 1 mg/72hr PATCH 72 HR Apply 1 Patch to skin as directed every 3 days as needed (NAUSEA).     • NON SPECIFIED 1 Each by Injection route Q30 DAYS. SEASONAL SHOT     • Plant Sterols and Stanols (CHOLESTOFF PO) Take 1 Tab by mouth 2 Times a Day.     • coenzyme Q-10 30 MG capsule Take 30 mg by mouth every morning.     • Multiple Vitamins-Minerals (WOMENS 50+ MULTI VITAMIN/MIN PO) Take 1 Tab by mouth every morning.     • albuterol 108 (90 Base) MCG/ACT Aero Soln inhalation aerosol Inhale 2 Puffs by mouth every 6 hours as needed for Shortness of Breath.     • Probiotic Product (PROBIOTIC COLON SUPPORT PO) Take 1 Cap by mouth every morning.     • Cholecalciferol (VITAMIN D3) 2000 UNIT Cap Take 4,000 Units by mouth every day.       No current Epic-ordered  facility-administered medications on file.       Past Medical History:   Diagnosis Date   • Allergic rhinitis    • Anesthesia     ponv   • Arthritis 07/2020    osteo- hands, knees, hips   • Cough    • Daytime sleepiness    • Eye drainage    • Fatigue    • Heartburn     very occ. diet related   • High cholesterol    • Hoarseness, persistent    • Hypertension    • MRSA (methicillin resistant staph aureus) culture positive 2010    from spider bite- no open wounds as of 2018   • Painful joint    • Palpitations    • Ringing in ears    • Wears glasses        Past Surgical History:   Procedure Laterality Date   • FINGER ARTHROPLASTY Left 7/28/2020    Procedure: ARTHROPLASTY, FINGER - THUMB CARPOMETACARPAL EXCISIONAL;  Surgeon: Edgar Leblanc M.D.;  Location: [facility];  Service: Orthopedics   • TENDON TRANSFER Left 7/28/2020    Procedure: TRANSFER, TENDON - FLEXOR CARPI RADIALIS;  Surgeon: Edgar Leblanc M.D.;  Location: [facility];  Service: Orthopedics   • CORTISONE INJECTION Bilateral 7/28/2020    Procedure: NJECTION RIGHT THUMB AND BILATERAL CARPAL TUNNELS;  Surgeon: Edgar Leblanc M.D.;  Location: [facility];  Service: Orthopedics   • JOINT INJECTION DIAGNOSTIC Bilateral 11/20/2018    Procedure: JOINT INJECTION DIAGNOSTIC - CMC JOINTS;  Surgeon: Edgar Leblanc M.D.;  Location: [facility];  Service: Orthopedics   • TRIGGER FINGER RELEASE Right 11/20/2018    Procedure: TRIGGER FINGER RELEASE - THUMB;  Surgeon: Edgar Leblanc M.D.;  Location: [facility];  Service: Orthopedics   • CARPAL TUNNEL ENDOSCOPIC  9/30/2014    Both side Performed by Edgar Leblanc M.D. at [facility]   • ABDOMINAL HYSTERECTOMY TOTAL      Hysterectomy and removal Ovaries and tubes, Total Abdominal in 2000   • BONE GRAFT      bone graft surgery on left second toe in 2002 due to fracture   • CYSTOSCOPY      Removal of polyps from urinary bladder and dilation of  "urethra in    • INCISION AND DRAINAGE ENT      I&D of left ear for ear infection twice in  and  by Dr Kahn, ENT   • NASAL SEPTAL RECONSTRUCTION             Social History     Tobacco Use   • Smoking status: Former Smoker     Packs/day: 0.00     Quit date: 1976     Years since quittin.2   • Smokeless tobacco: Never Used   Substance Use Topics   • Alcohol use: Yes     Alcohol/week: 3.6 oz     Types: 6 Glasses of wine per week     Comment: OCC   • Drug use: No       Social History     Social History Narrative   • Not on file       Family History   Problem Relation Age of Onset   • Cancer Paternal Grandmother    • Other Paternal Grandmother         lupus   • Heart Disease Paternal Grandmother          70s   • Cancer Paternal Aunt    • Other Mother         Parkinson disease   • Bipolar disorder Mother    • Hypertension Mother    • Thyroid Mother         Hypothyroid   • Heart Disease Father         Congenital heart disease, CHF   • Hypertension Father    • Diabetes Father    • Stroke Sister         age 53, smoker   • Bipolar disorder Sister    • Other Sister         alcoholic        Exam: /60 (BP Location: Right arm, Patient Position: Sitting, BP Cuff Size: Adult)   Pulse 66   Temp 36.9 °C (98.4 °F) (Temporal)   Resp 17   Ht 1.6 m (5' 3\")   Wt 63.6 kg (140 lb 3.2 oz)   SpO2 100%  Body mass index is 24.84 kg/m².    General: Normal appearing. No distress.  EYES: Conjunctiva clear lids without ptosis, pupils equal  EARS: Normal shape and contour   Pulmonary: Clear to ausculation.  Normal effort. No rales or wheezing.  Cardiovascular: Regular rate and rhythm without significant murmur.   Abdomen: Soft, nontender, nondistended. Normal bowel sounds.  Neurologic: Cranial nerves grossly nonfocal  Skin: Warm and dry.  No obvious lesions.  Musculoskeletal: Normal gait. No extremity cyanosis, clubbing, or edema.  Psych: Normal mood and affect. Alert and oriented x3. Judgment and insight is " normal.      Assessment/Plan  1. Encounter for screening mammogram for malignant neoplasm of breast  No new symptoms will be due for screening  - MA-SCREENING MAMMO BILAT W/TOMOSYNTHESIS W/CAD; Future    2. Other hyperlipidemia  Continue current management, intolerant to statins.  10-year ASCVD risk score of 5%.  - ezetimibe (ZETIA) 10 MG Tab; Take 1 tablet by mouth every day.  Dispense: 90 tablet; Refill: 4  - Comp Metabolic Panel; Future    3. Pure hypercholesterolemia  See #2 above  - Comp Metabolic Panel; Future  - REFERRAL TO CARDIOLOGY    4. History of hypothyroidism  Stable and euthyroid off treatment will reassess  - TSH WITH REFLEX TO FT4; Future  - Comp Metabolic Panel; Future  - CBC WITHOUT DIFFERENTIAL; Future    5. Essential hypertension  Well-controlled continue lisinopril 20 mg daily.  - Comp Metabolic Panel; Future  - CBC WITHOUT DIFFERENTIAL; Future    6. Vitamin D insufficiency  Continue supplement and can consider reassessing status in the future.    7. Gastroesophageal reflux disease without esophagitis  Patient states controlled on Prilosec no dysphagia or concerning GI symptoms.  She will work on the 7 pound weight gain, improving diet and regular exercise.  She says she eats dinner early, and has eliminated many GERD trigger foods.    8. Urine abnormality  - REFERRAL TO UROLOGY    9. Arthritis of carpometacarpal (CMC) joint of left thumb  Follow-up Ortho status post recent surgery    10. Atrial septal defect  Unclear cause of her palpitations.  Echocardiogram 2019 reviewed.  Plan for cardiac monitor and she does wish to see cardiology specialist.  - [facility] Zio Patch Monitor; Future  - REFERRAL TO CARDIOLOGY    11. Palpitations  See #10 above  - RIH Zio Patch Monitor; Future  - REFERRAL TO CARDIOLOGY      rtc 6m pt request        Please note that this dictation was created using voice recognition software. I have made every reasonable attempt to correct obvious errors, but I expect that there are  errors of grammar and possibly content that I did not discover before finalizing the note.

## 2021-04-14 ENCOUNTER — OFFICE VISIT (OUTPATIENT)
Dept: CARDIOLOGY | Facility: MEDICAL CENTER | Age: 61
End: 2021-04-14
Attending: INTERNAL MEDICINE
Payer: COMMERCIAL

## 2021-04-14 VITALS
HEIGHT: 63 IN | OXYGEN SATURATION: 99 % | BODY MASS INDEX: 24.8 KG/M2 | SYSTOLIC BLOOD PRESSURE: 114 MMHG | HEART RATE: 70 BPM | DIASTOLIC BLOOD PRESSURE: 58 MMHG | WEIGHT: 140 LBS

## 2021-04-14 DIAGNOSIS — Z78.9 STATIN INTOLERANCE: ICD-10-CM

## 2021-04-14 DIAGNOSIS — I10 ESSENTIAL HYPERTENSION: ICD-10-CM

## 2021-04-14 DIAGNOSIS — R00.2 PALPITATIONS: ICD-10-CM

## 2021-04-14 DIAGNOSIS — E78.00 PURE HYPERCHOLESTEROLEMIA: ICD-10-CM

## 2021-04-14 DIAGNOSIS — Q21.10 ATRIAL SEPTAL DEFECT: ICD-10-CM

## 2021-04-14 LAB — EKG IMPRESSION: NORMAL

## 2021-04-14 PROCEDURE — 99204 OFFICE O/P NEW MOD 45 MIN: CPT | Performed by: INTERNAL MEDICINE

## 2021-04-14 PROCEDURE — 93000 ELECTROCARDIOGRAM COMPLETE: CPT | Performed by: INTERNAL MEDICINE

## 2021-04-14 RX ORDER — PRAVASTATIN SODIUM 10 MG
10 TABLET ORAL EVERY EVENING
Qty: 30 TABLET | Refills: 3 | Status: SHIPPED | OUTPATIENT
Start: 2021-04-14 | End: 2021-08-09

## 2021-04-14 RX ORDER — MULTIVITAMIN WITH IRON
250 TABLET ORAL
COMMUNITY

## 2021-04-14 ASSESSMENT — ENCOUNTER SYMPTOMS
IRREGULAR HEARTBEAT: 0
FEVER: 0
CLAUDICATION: 0
COUGH: 0
DEPRESSION: 0
DIARRHEA: 0
WEIGHT GAIN: 0
WEIGHT LOSS: 0
VOMITING: 0
NEAR-SYNCOPE: 0
FLANK PAIN: 0
SYNCOPE: 0
ALTERED MENTAL STATUS: 0
DYSPNEA ON EXERTION: 0
DECREASED APPETITE: 0
SHORTNESS OF BREATH: 0
BLURRED VISION: 0
HEARTBURN: 0
ABDOMINAL PAIN: 0
PALPITATIONS: 1
PND: 0
CONSTIPATION: 0
BACK PAIN: 0
NAUSEA: 0
DIZZINESS: 0
ORTHOPNEA: 0

## 2021-04-14 ASSESSMENT — FIBROSIS 4 INDEX: FIB4 SCORE: 1.06

## 2021-04-14 NOTE — PATIENT INSTRUCTIONS
Pravastatin tablets  What is this medicine?  PRAVASTATIN (PRA va stat in) is known as a HMG-CoA reductase inhibitor or 'statin'. It lowers the level of cholesterol and triglycerides in the blood. This drug may also reduce the risk of heart attack, stroke, or other health problems in patients with risk factors for heart disease. Diet and lifestyle changes are often used with this drug.  This medicine may be used for other purposes; ask your health care provider or pharmacist if you have questions.  COMMON BRAND NAME(S): Pravachol  What should I tell my health care provider before I take this medicine?  They need to know if you have any of these conditions:  · diabetes  · if you often drink alcohol  · history of stroke  · kidney disease  · liver disease  · muscle aches or weakness  · thyroid disease  · an unusual or allergic reaction to pravastatin, other medicines, foods, dyes, or preservatives  · pregnant or trying to get pregnant  · breast-feeding  How should I use this medicine?  Take pravastatin tablets by mouth. Swallow the tablets with a drink of water. Pravastatin can be taken at anytime of the day, with or without food. Follow the directions on the prescription label. Take your doses at regular intervals. Do not take your medicine more often than directed.  Talk to your pediatrician regarding the use of this medicine in children. Special care may be needed. Pravastatin has been used in children as young as 8 years of age.  Overdosage: If you think you have taken too much of this medicine contact a poison control center or emergency room at once.  NOTE: This medicine is only for you. Do not share this medicine with others.  What if I miss a dose?  If you miss a dose, take it as soon as you can. If it is almost time for your next dose, take only that dose. Do not take double or extra doses.  What may interact with this medicine?  This medicine may interact with the following  medications:  · colchicine  · cyclosporine  · other medicines for high cholesterol  · some antibiotics like azithromycin, clarithromycin, erythromycin, and telithromycin  This list may not describe all possible interactions. Give your health care provider a list of all the medicines, herbs, non-prescription drugs, or dietary supplements you use. Also tell them if you smoke, drink alcohol, or use illegal drugs. Some items may interact with your medicine.  What should I watch for while using this medicine?  Visit your doctor or health care professional for regular check-ups. You may need regular tests to make sure your liver is working properly.  Your health care professional may tell you to stop taking this medicine if you develop muscle problems. If your muscle problems do not go away after stopping this medicine, contact your health care professional.  Do not become pregnant while taking this medicine. Women should inform their health care professional if they wish to become pregnant or think they might be pregnant. There is a potential for serious side effects to an unborn child. Talk to your health care professional or pharmacist for more information. Do not breast-feed an infant while taking this medicine.  This medicine may affect blood sugar levels. If you have diabetes, check with your doctor or health care professional before you change your diet or the dose of your diabetic medicine.  If you are going to need surgery or other procedure, tell your doctor that you are using this medicine.  This drug is only part of a total heart-health program. Your doctor or a dietician can suggest a low-cholesterol and low-fat diet to help. Avoid alcohol and smoking, and keep a proper exercise schedule.  This medicine may cause a decrease in Co-Enzyme Q-10. You should make sure that you get enough Co-Enzyme Q-10 while you are taking this medicine. Discuss the foods you eat and the vitamins you take with your health care  professional.  What side effects may I notice from receiving this medicine?  Side effects that you should report to your doctor or health care professional as soon as possible:  · allergic reactions like skin rash, itching or hives, swelling of the face, lips, or tongue  · dark urine  · fever  · muscle pain, cramps, or weakness  · redness, blistering, peeling or loosening of the skin, including inside the mouth  · trouble passing urine or change in the amount of urine  · unusually weak or tired  · yellowing of the eyes or skin  Side effects that usually do not require medical attention (report to your doctor or health care professional if they continue or are bothersome):  · gas  · headache  · heartburn  · indigestion  · stomach pain  This list may not describe all possible side effects. Call your doctor for medical advice about side effects. You may report side effects to FDA at 4-159-FDA-1619.  Where should I keep my medicine?  Keep out of the reach of children.  Store at room temperature between 15 to 30 degrees C (59 to 86 degrees F). Protect from light. Keep container tightly closed. Throw away any unused medicine after the expiration date.  NOTE: This sheet is a summary. It may not cover all possible information. If you have questions about this medicine, talk to your doctor, pharmacist, or health care provider.  © 2020 [employer] (2018-08-21 12:37:09)

## 2021-04-14 NOTE — PROGRESS NOTES
Cardiology Note    Chief Complaint   Patient presents with   • Palpitations   • Hyperlipidemia     Dx: pure hypercholesterolemia       History of Present Illness: Danii Rodriguez is a 60 y.o. female PMH HLD, ASD, palpitations who presents for palpitations.     Reminds in 2019 had scare with global amnesia with rapid onset memory loss. Underwent workup. Found PFO.     Now with nightly palpitations. Last about 15 seconds. Doesn't occur the rest of the day. Also has insomnia. Found to have sleep apnea and started on cpap, however, has given up on it due to insomnia. Feels fatigued the next day as a result. Adds her mother lives with her and she is struggling with alzheimer's and often gets up at night. Patient doesn't exert very much, but does walk at work during lunch hour. Not limited when doing so. No cardiac complaints during the day. She is careful with her diet. Works as dispatcher. Reminds that did not tolerate statin as had a htn urgency event. Father also had HLD and resultant heart disease. Grandmother also with HLD. No informaltion/history on grandfather. Patient has two sisters not known for sure     Review of Systems   Constitution: Negative for decreased appetite, fever, malaise/fatigue, weight gain and weight loss.   HENT: Negative for congestion and nosebleeds.    Eyes: Negative for blurred vision.   Cardiovascular: Positive for palpitations. Negative for chest pain, claudication, dyspnea on exertion, irregular heartbeat, leg swelling, near-syncope, orthopnea, paroxysmal nocturnal dyspnea and syncope.   Respiratory: Negative for cough and shortness of breath.    Endocrine: Negative for cold intolerance and heat intolerance.   Skin: Negative for rash.   Musculoskeletal: Negative for back pain.   Gastrointestinal: Negative for abdominal pain, constipation, diarrhea, heartburn, melena, nausea and vomiting.   Genitourinary: Negative for dysuria, flank pain and hematuria.   Neurological: Negative for  dizziness.   Psychiatric/Behavioral: Negative for altered mental status and depression.         Past Medical History:   Diagnosis Date   • Allergic rhinitis    • Anesthesia     ponv   • Arthritis 07/2020    osteo- hands, knees, hips   • Cough    • Daytime sleepiness    • Eye drainage    • Fatigue    • Heartburn     very occ. diet related   • High cholesterol    • Hoarseness, persistent    • Hypertension    • MRSA (methicillin resistant staph aureus) culture positive 2010    from spider bite- no open wounds as of 2018   • Painful joint    • Palpitations    • Ringing in ears    • Wears glasses          Past Surgical History:   Procedure Laterality Date   • FINGER ARTHROPLASTY Left 7/28/2020    Procedure: ARTHROPLASTY, FINGER - THUMB CARPOMETACARPAL EXCISIONAL;  Surgeon: Edgar Leblanc M.D.;  Location: [facility];  Service: Orthopedics   • TENDON TRANSFER Left 7/28/2020    Procedure: TRANSFER, TENDON - FLEXOR CARPI RADIALIS;  Surgeon: Edgar Leblanc M.D.;  Location: [facility];  Service: Orthopedics   • CORTISONE INJECTION Bilateral 7/28/2020    Procedure: NJECTION RIGHT THUMB AND BILATERAL CARPAL TUNNELS;  Surgeon: Edgar Leblanc M.D.;  Location: [facility];  Service: Orthopedics   • JOINT INJECTION DIAGNOSTIC Bilateral 11/20/2018    Procedure: JOINT INJECTION DIAGNOSTIC - CMC JOINTS;  Surgeon: Edgar Leblanc M.D.;  Location: [facility];  Service: Orthopedics   • TRIGGER FINGER RELEASE Right 11/20/2018    Procedure: TRIGGER FINGER RELEASE - THUMB;  Surgeon: Edgar Leblanc M.D.;  Location: [facility];  Service: Orthopedics   • CARPAL TUNNEL ENDOSCOPIC  9/30/2014    Both side Performed by Edgar Leblanc M.D. at [facility]   • ABDOMINAL HYSTERECTOMY TOTAL      Hysterectomy and removal Ovaries and tubes, Total Abdominal in 2000   • BONE GRAFT      bone graft surgery on left second toe in 2002 due to fracture   • CYSTOSCOPY       Removal of polyps from urinary bladder and dilation of urethra in 2000   • INCISION AND DRAINAGE ENT      I&D of left ear for ear infection twice in 2010 and 2011 by Dr Kahn, ENT   • NASAL SEPTAL RECONSTRUCTION      2005         Current Outpatient Medications   Medication Sig Dispense Refill   • Magnesium 250 MG Tab Take 250 mg by mouth at bedtime.     • pravastatin (PRAVACHOL) 10 MG Tab Take 1 tablet by mouth every evening. 30 tablet 3   • ezetimibe (ZETIA) 10 MG Tab Take 1 tablet by mouth every day. 90 tablet 4   • lisinopril (PRINIVIL) 20 MG Tab TAKE 1 TABLET BY MOUTH EVERY DAY 90 Tab 4   • NON SPECIFIED Topical cream for face     • ibuprofen (MOTRIN) 200 MG Tab Take 600 mg by mouth every day.     • fluticasone (FLONASE) 50 MCG/ACT nasal spray Spray 2 Sprays in nose every morning as needed (ALLERGY).     • scopolamine (TRANSDERM-SCOP) 1 mg/72hr PATCH 72 HR Apply 1 Patch to skin as directed every 3 days as needed (NAUSEA).     • NON SPECIFIED 1 Each by Injection route Q30 DAYS. SEASONAL SHOT     • Plant Sterols and Stanols (CHOLESTOFF PO) Take 1 Tab by mouth 2 Times a Day.     • coenzyme Q-10 30 MG capsule Take 30 mg by mouth every morning.     • Multiple Vitamins-Minerals (WOMENS 50+ MULTI VITAMIN/MIN PO) Take 1 Tab by mouth every morning.     • albuterol 108 (90 Base) MCG/ACT Aero Soln inhalation aerosol Inhale 2 Puffs by mouth every 6 hours as needed for Shortness of Breath.     • Probiotic Product (PROBIOTIC COLON SUPPORT PO) Take 1 Cap by mouth every morning.     • Cholecalciferol (VITAMIN D3) 2000 UNIT Cap Take 4,000 Units by mouth every day.       No current facility-administered medications for this visit.         Allergies   Allergen Reactions   • Ciprofloxacin Hives and Nausea     Okay to take cipro ear drops, no reaction with ear drop   • Hydrocodone Rash     RASH     • Sulfa Drugs Rash and Itching     Not had but was told not to use   • Latex Rash and Itching     Tape burns and rash- paper tape OK    • Other Environmental Itching     seasonal   • Statins [Hmg-Coa-R Inhibitors] Unspecified     Hypertensive Crisis         Family History   Problem Relation Age of Onset   • Cancer Paternal Grandmother    • Other Paternal Grandmother         lupus   • Heart Disease Paternal Grandmother          70s   • Cancer Paternal Aunt    • Other Mother         Parkinson disease   • Bipolar disorder Mother    • Hypertension Mother    • Thyroid Mother         Hypothyroid   • Heart Disease Father         Congenital heart disease, CHF   • Hypertension Father    • Diabetes Father    • Stroke Sister         age 53, smoker   • Bipolar disorder Sister    • Other Sister         alcoholic          Social History     Socioeconomic History   • Marital status:      Spouse name: Not on file   • Number of children: Not on file   • Years of education: Not on file   • Highest education level: Not on file   Occupational History   • Not on file   Tobacco Use   • Smoking status: Former Smoker     Packs/day: 0.00     Quit date: 1976     Years since quittin.2   • Smokeless tobacco: Never Used   Substance and Sexual Activity   • Alcohol use: Yes     Alcohol/week: 3.6 oz     Types: 6 Glasses of wine per week     Comment: OCC   • Drug use: No   • Sexual activity: Not Currently   Other Topics Concern   • Not on file   Social History Narrative   • Not on file     Social Determinants of Health     Financial Resource Strain:    • Difficulty of Paying Living Expenses:    Food Insecurity:    • Worried About Running Out of Food in the Last Year:    • Ran Out of Food in the Last Year:    Transportation Needs:    • Lack of Transportation (Medical):    • Lack of Transportation (Non-Medical):    Physical Activity:    • Days of Exercise per Week:    • Minutes of Exercise per Session:    Stress:    • Feeling of Stress :    Social Connections:    • Frequency of Communication with Friends and Family:    • Frequency of Social Gatherings with  "Friends and Family:    • Attends [employer] Services:    • Active Member of Clubs or Organizations:    • Attends Club or Organization Meetings:    • Marital Status:    Intimate Partner Violence:    • Fear of Current or Ex-Partner:    • Emotionally Abused:    • Physically Abused:    • Sexually Abused:          Physical Exam:  Ambulatory Vitals  /58 (BP Location: Left arm, Patient Position: Sitting, BP Cuff Size: Adult)   Pulse 70   Ht 1.6 m (5' 3\")   Wt 63.5 kg (140 lb)   SpO2 99%    BP Readings from Last 4 Encounters:   04/14/21 114/58   03/25/21 120/60   07/28/20 127/77   04/08/20 134/63     Weight/BMI:   Vitals:    04/14/21 1122   BP: 114/58   Weight: 63.5 kg (140 lb)   Height: 1.6 m (5' 3\")    Body mass index is 24.8 kg/m².  Wt Readings from Last 4 Encounters:   04/14/21 63.5 kg (140 lb)   03/25/21 63.6 kg (140 lb 3.2 oz)   10/13/20 62.6 kg (138 lb)   07/28/20 60.3 kg (132 lb 15 oz)       Physical Exam   Constitutional: She is oriented to person, place, and time and well-developed, well-nourished, and in no distress. No distress.   HENT:   Head: Normocephalic and atraumatic.   Eyes: Pupils are equal, round, and reactive to light. Conjunctivae are normal.   Neck: No JVD present.   Cardiovascular: Normal rate, regular rhythm, normal heart sounds and intact distal pulses. Exam reveals no gallop and no friction rub.   No murmur heard.  Pulmonary/Chest: Effort normal and breath sounds normal. No respiratory distress. She has no wheezes. She has no rales. She exhibits no tenderness.   Abdominal: Soft. Bowel sounds are normal. She exhibits no distension.   Musculoskeletal:         General: No edema.      Cervical back: Normal range of motion and neck supple.   Neurological: She is alert and oriented to person, place, and time.   Skin: Skin is warm and dry.   Psychiatric: Affect and judgment normal.       Lab Data Review:  Lab Results   Component Value Date/Time    CHOLSTRLTOT 242 (H) 11/16/2020 11:05 AM    LDL " 147 (H) 11/16/2020 11:05 AM    HDL 79 11/16/2020 11:05 AM    TRIGLYCERIDE 81 11/16/2020 11:05 AM       Lab Results   Component Value Date/Time    SODIUM 139 11/16/2020 11:05 AM    POTASSIUM 4.2 11/16/2020 11:05 AM    CHLORIDE 103 11/16/2020 11:05 AM    CO2 26 11/16/2020 11:05 AM    GLUCOSE 80 11/16/2020 11:05 AM    BUN 13 11/16/2020 11:05 AM    CREATININE 0.62 11/16/2020 11:05 AM     CrCl cannot be calculated (Patient's most recent lab result is older than the maximum 7 days allowed.).  Lab Results   Component Value Date/Time    ALKPHOSPHAT 59 11/16/2020 11:05 AM    ASTSGOT 22 11/16/2020 11:05 AM    ALTSGPT 27 11/16/2020 11:05 AM    TBILIRUBIN 0.5 11/16/2020 11:05 AM      Lab Results   Component Value Date/Time    WBC 5.7 05/15/2020 10:25 AM     Lab Results   Component Value Date/Time    HBA1C 5.4 05/15/2020 10:25 AM     No components found for: TROP      Cardiac Imaging and Procedures Review:      EKG 4/14/21 reviewed by me normal sinus     TTE 10/2019  CONCLUSIONS  No prior study is available for comparison.   Evidence of early (0-5 beats) right to left shunt suggestive of   intracardiac shunt (ASD or PFO).  Left ventricular ejection fraction is visually estimated to be 60%.  Normal left ventricular systolic function.   No significant valve disease or flow abnormalities.   Left Ventricle  Normal left ventricular size, thickness, systolic function, and   diastolic function. Left ventricular ejection fraction is visually   estimated to be 60%. Normal regional wall motion.    Cardiac pharm NST 6/2018   NUCLEAR IMAGING INTERPRETATION   Normal left ventricular size, ejection fraction, and wall motion.   No evidence of significant jeopardized viable myocardium or prior myocardial    infarction.   ECG INTERPRETATION   Negative stress ECG for ischemia.    Medical Decision Making:  Problem List Items Addressed This Visit     Pure hypercholesterolemia    Relevant Medications    pravastatin (PRAVACHOL) 10 MG Tab    Essential  hypertension    Relevant Medications    pravastatin (PRAVACHOL) 10 MG Tab    Other Relevant Orders    EKG (Completed)    Statin intolerance    Atrial septal defect    Relevant Medications    pravastatin (PRAVACHOL) 10 MG Tab    Palpitations    Relevant Orders    EKG (Completed)    Cardiac Event Monitor        HLD / interm 10 year risk ascvd - peak  in 2017. Microvascular changes on brain MRI. Family history HLD although states she did undergo healthy [place] screening and no reported genetic abnormalities. Attempt lower intensity statin such as pravastatin. Continue zetia. Will then repeat lipids for goal 30-50% reduction.     HTN - goal 120/80. Controlled. Continue lisinopril.    ASD on TTE - no indication for closure at this time.     Palpitations - likely benign. Can check 2 day event monitor.     It was my pleasure to meet with Ms. Rodriguez.

## 2021-04-23 ENCOUNTER — HOSPITAL ENCOUNTER (OUTPATIENT)
Facility: MEDICAL CENTER | Age: 61
End: 2021-04-23
Attending: NURSE PRACTITIONER
Payer: COMMERCIAL

## 2021-04-23 ENCOUNTER — OFFICE VISIT (OUTPATIENT)
Dept: URGENT CARE | Facility: CLINIC | Age: 61
End: 2021-04-23
Payer: COMMERCIAL

## 2021-04-23 VITALS
SYSTOLIC BLOOD PRESSURE: 116 MMHG | WEIGHT: 141.2 LBS | HEART RATE: 76 BPM | RESPIRATION RATE: 16 BRPM | DIASTOLIC BLOOD PRESSURE: 68 MMHG | TEMPERATURE: 97.6 F | HEIGHT: 63 IN | OXYGEN SATURATION: 98 % | BODY MASS INDEX: 25.02 KG/M2

## 2021-04-23 DIAGNOSIS — R39.9 LOWER URINARY TRACT SYMPTOMS (LUTS): ICD-10-CM

## 2021-04-23 LAB
APPEARANCE UR: CLEAR
BILIRUB UR STRIP-MCNC: NEGATIVE MG/DL
COLOR UR AUTO: YELLOW
GLUCOSE UR STRIP.AUTO-MCNC: NEGATIVE MG/DL
KETONES UR STRIP.AUTO-MCNC: NEGATIVE MG/DL
LEUKOCYTE ESTERASE UR QL STRIP.AUTO: NEGATIVE
NITRITE UR QL STRIP.AUTO: NEGATIVE
PH UR STRIP.AUTO: 7 [PH] (ref 5–8)
PROT UR QL STRIP: NEGATIVE MG/DL
RBC UR QL AUTO: NEGATIVE
SP GR UR STRIP.AUTO: 1.02
UROBILINOGEN UR STRIP-MCNC: 0.2 MG/DL

## 2021-04-23 PROCEDURE — 87086 URINE CULTURE/COLONY COUNT: CPT

## 2021-04-23 PROCEDURE — 81002 URINALYSIS NONAUTO W/O SCOPE: CPT | Performed by: NURSE PRACTITIONER

## 2021-04-23 PROCEDURE — 99213 OFFICE O/P EST LOW 20 MIN: CPT | Performed by: NURSE PRACTITIONER

## 2021-04-23 RX ORDER — CEFDINIR 300 MG/1
300 CAPSULE ORAL EVERY 12 HOURS
Qty: 10 CAPSULE | Refills: 0 | Status: SHIPPED | OUTPATIENT
Start: 2021-04-23 | End: 2021-04-28

## 2021-04-23 ASSESSMENT — ENCOUNTER SYMPTOMS
VOMITING: 0
FEVER: 0
CHILLS: 0
HEADACHES: 0
DIZZINESS: 0
ABDOMINAL PAIN: 0
NAUSEA: 0
FLANK PAIN: 1

## 2021-04-23 ASSESSMENT — FIBROSIS 4 INDEX: FIB4 SCORE: 1.06

## 2021-04-23 NOTE — PROGRESS NOTES
Subjective:   Danii Rodriguez is a 60 y.o. female who presents for UTI (2x days, on going issue, lower back pain, abd pain, having trouble urinationg)      HPI  60-year-old female patient in urgent care for urinary tract infection-like symptoms.  Patient states she is having left-sided lower back pain, abdominal pain and urinary frequency with urinary retention type symptoms.  Patient states that 2 nights ago she got up 20 times and was unable to urinate.  States that she has a urological history with a previous surgery to remove urethral polyps.  Was also stated that her anatomy is slightly abnormal and has a hard time initiating her stream sometimes.  Does have a follow-up appointment with urology in 4 days but did not want to go through the weekend without antibiotics if she had a urinary tract infection.  Denies fever, chills, nausea, vomiting.    Review of Systems   Constitutional: Negative for chills, fever and malaise/fatigue.   Gastrointestinal: Negative for abdominal pain, nausea and vomiting.   Genitourinary: Positive for flank pain, frequency and urgency. Negative for dysuria and hematuria.   Skin: Negative for rash.   Neurological: Negative for dizziness and headaches.   All other systems reviewed and are negative.      Patient Active Problem List   Diagnosis   • Seasonal allergies   • Gastroesophageal reflux disease without esophagitis   • History of carpal tunnel repair   • Pressure sensation in left ear, left temporal area and left TM joint   • History of hypothyroidism   • Pure hypercholesterolemia   • Ulcer of esophagus without bleeding   • Muscle spasms of neck   • Bilateral leg cramps   • Essential hypertension   • Vitamin D insufficiency   • Primary insomnia   • Hot flashes, menopausal   • Primary osteoarthritis of both hands   • Transient global amnesia   • Stress and adjustment reaction   • Statin intolerance   • Arthritis of carpometacarpal (CMC) joint of left thumb   • Atrial septal defect    • Palpitations     Past Surgical History:   Procedure Laterality Date   • FINGER ARTHROPLASTY Left 2020    Procedure: ARTHROPLASTY, FINGER - THUMB CARPOMETACARPAL EXCISIONAL;  Surgeon: Edgar Leblanc M.D.;  Location: [facility];  Service: Orthopedics   • TENDON TRANSFER Left 2020    Procedure: TRANSFER, TENDON - FLEXOR CARPI RADIALIS;  Surgeon: Edgar Leblanc M.D.;  Location: [facility];  Service: Orthopedics   • CORTISONE INJECTION Bilateral 2020    Procedure: NJECTION RIGHT THUMB AND BILATERAL CARPAL TUNNELS;  Surgeon: Edgar Leblanc M.D.;  Location: [facility];  Service: Orthopedics   • JOINT INJECTION DIAGNOSTIC Bilateral 2018    Procedure: JOINT INJECTION DIAGNOSTIC - CMC JOINTS;  Surgeon: Edgar Leblanc M.D.;  Location: [facility];  Service: Orthopedics   • TRIGGER FINGER RELEASE Right 2018    Procedure: TRIGGER FINGER RELEASE - THUMB;  Surgeon: Edgar Leblanc M.D.;  Location: [facility];  Service: Orthopedics   • CARPAL TUNNEL ENDOSCOPIC  2014    Both side Performed by Edgar Leblanc M.D. at [facility]   • ABDOMINAL HYSTERECTOMY TOTAL      Hysterectomy and removal Ovaries and tubes, Total Abdominal in    • BONE GRAFT      bone graft surgery on left second toe in  due to fracture   • CYSTOSCOPY      Removal of polyps from urinary bladder and dilation of urethra in    • INCISION AND DRAINAGE ENT      I&D of left ear for ear infection twice in  and  by Dr Kahn, ENT   • NASAL SEPTAL RECONSTRUCTION           Social History     Tobacco Use   • Smoking status: Former Smoker     Packs/day: 0.00     Quit date: 1976     Years since quittin.2   • Smokeless tobacco: Never Used   Substance Use Topics   • Alcohol use: Yes     Alcohol/week: 3.6 oz     Types: 6 Glasses of wine per week     Comment: OCC   • Drug use: No      Family History   Problem Relation Age of  "Onset   • Cancer Paternal Grandmother    • Other Paternal Grandmother         lupus   • Heart Disease Paternal Grandmother          70s   • Cancer Paternal Aunt    • Other Mother         Parkinson disease   • Bipolar disorder Mother    • Hypertension Mother    • Thyroid Mother         Hypothyroid   • Heart Disease Father         Congenital heart disease, CHF   • Hypertension Father    • Diabetes Father    • Stroke Sister         age 53, smoker   • Bipolar disorder Sister    • Other Sister         alcoholic       (Allergies, Medications, & Tobacco/Substance Use were reconciled by the Medical Assistant and reviewed by myself. The family history is prepopulated)     Objective:     /68 (BP Location: Left arm, Patient Position: Sitting, BP Cuff Size: Adult)   Pulse 76   Temp 36.4 °C (97.6 °F) (Temporal)   Resp 16   Ht 1.6 m (5' 3\")   Wt 64 kg (141 lb 3.2 oz)   SpO2 98%   BMI 25.01 kg/m²     Physical Exam  Vitals reviewed.   Constitutional:       Appearance: Normal appearance.   Cardiovascular:      Rate and Rhythm: Normal rate and regular rhythm.      Heart sounds: Normal heart sounds.   Pulmonary:      Effort: Pulmonary effort is normal.      Breath sounds: Normal breath sounds.   Abdominal:      General: Abdomen is flat. Bowel sounds are normal. There is no distension.      Palpations: Abdomen is soft. There is no mass.      Tenderness: There is abdominal tenderness in the suprapubic area. There is no right CVA tenderness or left CVA tenderness.      Hernia: No hernia is present.   Skin:     General: Skin is warm.   Neurological:      Mental Status: She is alert and oriented to person, place, and time.   Psychiatric:         Mood and Affect: Mood normal.         Behavior: Behavior normal.         Thought Content: Thought content normal.         Judgment: Judgment normal.       Lab Results   Component Value Date/Time    POCCOLOR Yellow 2021 09:34 AM    POCAPPEAR Clear 2021 09:34 AM    " POCLEUKEST Negative 04/23/2021 09:34 AM    POCNITRITE Negative 04/23/2021 09:34 AM    POCUROBILIGE 0.2 04/23/2021 09:34 AM    POCPROTEIN Negative 04/23/2021 09:34 AM    POCURPH 7.0 04/23/2021 09:34 AM    POCBLOOD Negative 04/23/2021 09:34 AM    POCSPGRV 1.020 04/23/2021 09:34 AM    POCKETONES Negative 04/23/2021 09:34 AM    POCBILIRUBIN Negative 04/23/2021 09:34 AM    POCGLUCUA Negative 04/23/2021 09:34 AM          Assessment/Plan:     1. Lower urinary tract symptoms (LUTS)  POCT Urinalysis    Urine Culture    cefdinir (OMNICEF) 300 MG Cap     Discussed physical examination findings as well as patient presentation only the patient symptoms may be due to urinary tract infections will start on antibiotics however discussed that she did have a normal urinalysis in clinic today.    - Pt educated on preventative measures for avoiding future UTIs  - Advised to increase fluid intake  - OTC Pyridium (Azo) for symptomatic relief, advised that it will turn urine orange in color  - Pending urine culture  - ER precautions given regarding pyelonephritis including fevers greater than 101 and, vomiting and dehydration, increased back pain.    Differential diagnosis, natural history, supportive care, and indications for immediate follow-up discussed.    Advised the patient to follow-up with the primary care physician for recheck, reevaluation, and consideration of further management.    Please note that this dictation was created using voice recognition software. I have made a reasonable attempt to correct obvious errors, but I expect that there are errors of grammar and possibly content that I did not discover before finalizing the note.    This note was electronically signed ROOSEVELT Zaragoza

## 2021-04-26 LAB
BACTERIA UR CULT: NORMAL
SIGNIFICANT IND 70042: NORMAL
SITE SITE: NORMAL
SOURCE SOURCE: NORMAL

## 2021-05-06 ENCOUNTER — HOSPITAL ENCOUNTER (OUTPATIENT)
Dept: RADIOLOGY | Facility: MEDICAL CENTER | Age: 61
End: 2021-05-06
Attending: UROLOGY
Payer: COMMERCIAL

## 2021-05-06 DIAGNOSIS — R10.9 FLANK PAIN: ICD-10-CM

## 2021-05-06 PROCEDURE — 74176 CT ABD & PELVIS W/O CONTRAST: CPT

## 2021-05-27 ENCOUNTER — TELEPHONE (OUTPATIENT)
Dept: CARDIOLOGY | Facility: MEDICAL CENTER | Age: 61
End: 2021-05-27

## 2021-05-27 NOTE — TELEPHONE ENCOUNTER
VR    Pt called stating she is feeling weakness, cramping, tiredness and Pt states with little exertion she gets dizzy and almost passes out. Pt states she believes this is due to pravastatin. Please call Pt back at [phone].    Thank you

## 2021-05-27 NOTE — TELEPHONE ENCOUNTER
You  Sid Castellano M.D. 5 hours ago (11:01 AM)     Pt not tolerating pravastatin and doesn't want to take statins anymore. Any further recommendations?   Thanks!      Sid Castellano M.D.  You 1 minute ago (4:37 PM)     Fair enough. Will come up with a different plan next visit. Thanks Neelam!      Called pt and informed. She previously had an appt with [facility] on 6/24, but she cancelled this because she lost her insurance. She has new insurance now, but needs to look into whether or not we are in network. She will call back to reschedule after she looks into this.

## 2021-05-27 NOTE — TELEPHONE ENCOUNTER
Returned call. Pt reports that after starting pravastatin she noticed subtle side effects, but these have gradually been worsening to the point that she struggles with day to day tasks. She reports weakness, back pain, fatigue, and dizziness when going to a bending to standing position. She doesn't feel comfortable taking statins.

## 2021-08-07 DIAGNOSIS — E78.00 PURE HYPERCHOLESTEROLEMIA: ICD-10-CM

## 2021-08-09 RX ORDER — PRAVASTATIN SODIUM 10 MG
TABLET ORAL
Qty: 90 TABLET | Refills: 2 | Status: SHIPPED | OUTPATIENT
Start: 2021-08-09 | End: 2022-01-03 | Stop reason: SINTOL

## 2021-09-09 NOTE — PROGRESS NOTES
I will discuss the result in upcoming appointment.     Juany Ralph MD  
Xray Chest 1 View- PORTABLE-Urgent

## 2021-09-30 ENCOUNTER — APPOINTMENT (OUTPATIENT)
Dept: MEDICAL GROUP | Facility: MEDICAL CENTER | Age: 61
End: 2021-09-30

## 2021-10-21 ENCOUNTER — HOSPITAL ENCOUNTER (OUTPATIENT)
Dept: LAB | Facility: MEDICAL CENTER | Age: 61
End: 2021-10-21
Attending: INTERNAL MEDICINE
Payer: COMMERCIAL

## 2021-10-21 DIAGNOSIS — I10 ESSENTIAL HYPERTENSION: ICD-10-CM

## 2021-10-21 DIAGNOSIS — E78.49 OTHER HYPERLIPIDEMIA: ICD-10-CM

## 2021-10-21 DIAGNOSIS — Z86.39 HISTORY OF HYPOTHYROIDISM: ICD-10-CM

## 2021-10-21 DIAGNOSIS — E78.00 PURE HYPERCHOLESTEROLEMIA: ICD-10-CM

## 2021-10-21 LAB
ALBUMIN SERPL BCP-MCNC: 4.9 G/DL (ref 3.2–4.9)
ALBUMIN/GLOB SERPL: 2.2 G/DL
ALP SERPL-CCNC: 56 U/L (ref 30–99)
ALT SERPL-CCNC: 24 U/L (ref 2–50)
ANION GAP SERPL CALC-SCNC: 11 MMOL/L (ref 7–16)
AST SERPL-CCNC: 21 U/L (ref 12–45)
BILIRUB SERPL-MCNC: 0.6 MG/DL (ref 0.1–1.5)
BUN SERPL-MCNC: 20 MG/DL (ref 8–22)
CALCIUM SERPL-MCNC: 10 MG/DL (ref 8.5–10.5)
CHLORIDE SERPL-SCNC: 104 MMOL/L (ref 96–112)
CO2 SERPL-SCNC: 24 MMOL/L (ref 20–33)
CREAT SERPL-MCNC: 0.92 MG/DL (ref 0.5–1.4)
GLOBULIN SER CALC-MCNC: 2.2 G/DL (ref 1.9–3.5)
GLUCOSE SERPL-MCNC: 83 MG/DL (ref 65–99)
POTASSIUM SERPL-SCNC: 4.8 MMOL/L (ref 3.6–5.5)
PROT SERPL-MCNC: 7.1 G/DL (ref 6–8.2)
SODIUM SERPL-SCNC: 139 MMOL/L (ref 135–145)
TSH SERPL DL<=0.005 MIU/L-ACNC: 1.18 UIU/ML (ref 0.38–5.33)

## 2021-10-21 PROCEDURE — 84443 ASSAY THYROID STIM HORMONE: CPT

## 2021-10-21 PROCEDURE — 80053 COMPREHEN METABOLIC PANEL: CPT

## 2021-10-21 PROCEDURE — 36415 COLL VENOUS BLD VENIPUNCTURE: CPT

## 2021-11-11 ENCOUNTER — APPOINTMENT (OUTPATIENT)
Dept: MEDICAL GROUP | Facility: MEDICAL CENTER | Age: 61
End: 2021-11-11
Payer: COMMERCIAL

## 2021-11-16 DIAGNOSIS — E78.49 OTHER HYPERLIPIDEMIA: ICD-10-CM

## 2021-11-16 RX ORDER — EZETIMIBE 10 MG/1
10 TABLET ORAL
Qty: 90 TABLET | Refills: 0 | Status: SHIPPED | OUTPATIENT
Start: 2021-11-16 | End: 2022-06-13

## 2021-11-16 NOTE — TELEPHONE ENCOUNTER
Received request via: Patient    Was the patient seen in the last year in this department? yes    Does the patient have an active prescription (recently filled or refills available) for medication(s) requested? No    Requested Prescriptions     Pending Prescriptions Disp Refills   • ezetimibe (ZETIA) 10 MG Tab 90 Tablet 0     Sig: Take 1 Tablet by mouth every day.

## 2022-01-03 ENCOUNTER — OFFICE VISIT (OUTPATIENT)
Dept: MEDICAL GROUP | Facility: PHYSICIAN GROUP | Age: 62
End: 2022-01-03
Payer: COMMERCIAL

## 2022-01-03 ENCOUNTER — HOSPITAL ENCOUNTER (OUTPATIENT)
Facility: MEDICAL CENTER | Age: 62
End: 2022-01-03
Payer: COMMERCIAL

## 2022-01-03 VITALS
OXYGEN SATURATION: 97 % | SYSTOLIC BLOOD PRESSURE: 110 MMHG | WEIGHT: 138 LBS | DIASTOLIC BLOOD PRESSURE: 70 MMHG | BODY MASS INDEX: 24.45 KG/M2 | RESPIRATION RATE: 16 BRPM | HEIGHT: 63 IN | TEMPERATURE: 98.8 F | HEART RATE: 72 BPM

## 2022-01-03 DIAGNOSIS — Z98.890 HISTORY OF CARPAL TUNNEL REPAIR: ICD-10-CM

## 2022-01-03 DIAGNOSIS — Z86.39 HISTORY OF HYPOTHYROIDISM: ICD-10-CM

## 2022-01-03 DIAGNOSIS — M54.50 CHRONIC MIDLINE LOW BACK PAIN WITHOUT SCIATICA: ICD-10-CM

## 2022-01-03 DIAGNOSIS — G89.29 CHRONIC RIGHT SHOULDER PAIN: ICD-10-CM

## 2022-01-03 DIAGNOSIS — R23.2 HOT FLASHES: ICD-10-CM

## 2022-01-03 DIAGNOSIS — G89.29 CHRONIC MIDLINE LOW BACK PAIN WITHOUT SCIATICA: ICD-10-CM

## 2022-01-03 DIAGNOSIS — N89.8 VAGINAL DISCHARGE: ICD-10-CM

## 2022-01-03 DIAGNOSIS — M54.9 MID BACK PAIN: ICD-10-CM

## 2022-01-03 DIAGNOSIS — E78.49 OTHER HYPERLIPIDEMIA: ICD-10-CM

## 2022-01-03 DIAGNOSIS — G89.29 CHRONIC MIDLINE THORACIC BACK PAIN: ICD-10-CM

## 2022-01-03 DIAGNOSIS — M79.671 RIGHT FOOT PAIN: ICD-10-CM

## 2022-01-03 DIAGNOSIS — M25.511 CHRONIC RIGHT SHOULDER PAIN: ICD-10-CM

## 2022-01-03 DIAGNOSIS — M54.6 CHRONIC MIDLINE THORACIC BACK PAIN: ICD-10-CM

## 2022-01-03 PROCEDURE — 87510 GARDNER VAG DNA DIR PROBE: CPT

## 2022-01-03 PROCEDURE — 87480 CANDIDA DNA DIR PROBE: CPT

## 2022-01-03 PROCEDURE — 99214 OFFICE O/P EST MOD 30 MIN: CPT

## 2022-01-03 PROCEDURE — 87660 TRICHOMONAS VAGIN DIR PROBE: CPT

## 2022-01-03 RX ORDER — PRAVASTATIN SODIUM 10 MG
TABLET ORAL
COMMUNITY
End: 2022-01-03 | Stop reason: SINTOL

## 2022-01-03 RX ORDER — OXYCODONE HYDROCHLORIDE 5 MG/1
TABLET ORAL
COMMUNITY
End: 2022-01-05

## 2022-01-03 RX ORDER — KETOROLAC TROMETHAMINE 10 MG/1
TABLET, FILM COATED ORAL
COMMUNITY
End: 2022-03-02

## 2022-01-03 RX ORDER — TRIAMCINOLONE ACETONIDE 1 MG/G
OINTMENT TOPICAL
COMMUNITY
End: 2022-03-02

## 2022-01-03 RX ORDER — ALBUTEROL SULFATE 90 UG/1
AEROSOL, METERED RESPIRATORY (INHALATION)
COMMUNITY

## 2022-01-03 RX ORDER — CEFDINIR 300 MG/1
CAPSULE ORAL
COMMUNITY
End: 2022-01-03

## 2022-01-03 ASSESSMENT — PATIENT HEALTH QUESTIONNAIRE - PHQ9: CLINICAL INTERPRETATION OF PHQ2 SCORE: 0

## 2022-01-03 ASSESSMENT — FIBROSIS 4 INDEX: FIB4 SCORE: 1.09

## 2022-01-03 NOTE — PROGRESS NOTES
CC:   Chief Complaint   Patient presents with   • Establish Care   • Foot Pain     1 year, right foot         HISTORY OF PRESENT ILLNESS: Patient is a 61 y.o. female established patient who presents today to discuss the following problems below:     Right foot pain  Patient reports that her most pressing issue at this time is her chronic right foot pain over a year.  She does note a bony prominence over the lateral aspect of her foot that is tender to palpation.  She does have some pain with walking.    Mid back pain  Patient has a history of chronic mid back pain that has gotten significantly worse.  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  She notices that mild housework causes severe pain in the mid back, and this is gotten significantly worse over the last couple of months.  She does have some low back pain as well.  She takes Advil for the pain at night.    History of carpal tunnel repair  Patient has a history of bilateral carpal tunnel repair in 2014.  She uses wraps and braces, but does notice some persistent pain in the bilateral hands.  At this time, she is focusing on her mother and would like to defer referral back to an orthopedist    Right shoulder pain  Patient has some chronic right shoulder pain.  Currently she does some stretches at home with some improvement.  Again, she is focused on caring for her mother and would like to defer further work-up for this at this time    Hot flashes  Patient reports that she had a hysterectomy at age 39 or 40.  She is still having some hot flashes during the day.    Vaginal itching  Patient reports that she has never had a history of yeast vaginitis or bacterial vaginosis.  However, she went to visit her son in [facility] in July and ever since then she has had some persistent itching with a rash around the vaginal region.  She reports that this does not happen every day.  Sweating and working out causes an increase.  Bounds at night  "improve her symptoms.  She reports that the discharge is white, but does not smell abnormal, there are no abnormal textures, and no other colors.      Past Medical History:   Diagnosis Date   • Allergic rhinitis    • Anesthesia     ponv   • Arthritis 07/2020    osteo- hands, knees, hips   • Cough    • Daytime sleepiness    • Eye drainage    • Fatigue    • Heartburn     very occ. diet related   • High cholesterol    • Hoarseness, persistent    • Hypertension    • MRSA (methicillin resistant staph aureus) culture positive 2010    from spider bite- no open wounds as of 2018   • Painful joint    • Palpitations    • Ringing in ears    • Wears glasses        Allergies:Ciprofloxacin, Hydrocodone, Sulfa drugs, Latex, Other environmental, Hydrocodone-acetaminophen, and Statins [hmg-coa-r inhibitors]    Review of Systems: Otherwise negative except for as stated above.      Exam: /70   Pulse 72   Temp 37.1 °C (98.8 °F) (Temporal)   Resp 16   Ht 1.6 m (5' 3\")   Wt 62.6 kg (138 lb)   SpO2 97%  Body mass index is 24.45 kg/m².    Gen: Alert and oriented x4. Well developed, well-nourished female in no apparent distress.  Skin: Warm, dry, good turgor, no rashes in visible areas or lacerations appreciated.   Eye: EOM intact, pupils equal, round and reactive, conjunctiva clear, lids normal.  Neck: Trachea midline, no masses, no thyromegaly  Lungs: Normal effort, CTA bilaterally, no wheezes, rhonchi, or rales. No stridor or audible wheezing. Equal chest expansion.   CV: Regular rate and rhythm. No murmurs, rubs, or gallops.  GI:  Soft, non-tender abdomen with no distention.   MSK: Normal gait, moves all extremities.  Bony prominence noted over the lateral fifth metatarsal.  Neuro: Alert and oriented x 4, non-focal exam with motor and sensory grossly intact.  Ext: No clubbing, cyanosis, edema.  Psych: Normal behavior, affect and mood.      Assessment/Plan:  61 y.o. female with the following -    1. Chronic midline low back " pain without sciatica  Patient with chronic midline low back pain and thoracic back pain.  This is primarily axial in nature.  This has been worsening over the last couple of months.  The pain does not radiate down the bilateral lower extremities or around the rib cage.  I do recommend that she trial some physical therapy for her symptoms.  She would like to start this in February, she is currently caring for her mother.  In the meantime, I would like some baseline x-rays.  We can also send her to pain management to determine if there are any other further interventions that could be offered.  Patient is fairly adamant that she will do cortisone injections, however due to the axial nature of her symptoms I feel that her symptoms may be more facet mediated in nature. She declines muscle relaxers at this time.   - Referral to Physical Therapy  - DX-LUMBAR SPINE-2 OR 3 VIEWS; Future  - Referral to Pain Clinic    2. Chronic midline thoracic back pain  Patient with chronic midline low back pain and thoracic back pain.  This is primarily axial in nature.  This has been worsening over the last couple of months.  The pain does not radiate down the bilateral lower extremities or around the rib cage.  I do recommend that she trial some physical therapy for her symptoms.  She would like to start this in February, she is currently caring for her mother.  In the meantime, I would like some baseline x-rays.  We can also send her to pain management to determine if there are any other further interventions that could be offered.  Patient is fairly adamant that she will do cortisone injections, however due to the axial nature of her symptoms I feel that her symptoms may be more facet mediated in nature. She declines muscle relaxers at this time.   - DX-THORACIC SPINE-2 VIEWS; Future  - Referral to Pain Clinic    3. Right foot pain  Patient's most pressing issue at this time is her chronic right foot pain.  Discussed with the patient  that I do feel that this is most likely a bone spur, however we can confirm with an x-ray.  In the interim, I will refer her to orthopedics for further management and evaluation.  She can continue with wrapping her foot as this improves her symptoms.  - Referral to Orthopedics  - DX-FOOT-COMPLETE 3+ RIGHT; Future    4. Other hyperlipidemia  Chronic condition.  She takes Colestid off and Zetia as well as coenzyme Q 10.  Recheck lipids.  Patient is unable to tolerate statins  - Lipid Profile; Future    5. History of hypothyroidism  Chronic condition.  Recheck TSH.  Not currently on any medications.   - TSH WITH REFLEX TO FT4; Future    6. Vaginal discharge  Discussed with patient that I would like to rule out Candida versus bacterial vaginosis.  I will let her know the results over my chart.    - VAGINAL PATHOGENS DNA PANEL; Future    7. History of carpal tunnel repair  Patient does have a recurrence of some wrist pain.  She is interested in a referral to Dr. Sanchez in several months for further evaluation.    9. Chronic right shoulder pain  Chronic condition.  Defer further work-up until patient is ready to pursue.    10. Hot flashes  Chronic condition.  Not managed.  Discussed with patient that we could trial a low-dose SNRI versus SSRI for assistance in management of her hot flashes.  Patient would like to defer this at this time.      Follow-up: Return in about 2 weeks (around 1/17/2022) for pap.    Health Maintenance: Completed      Please note that this dictation was created using voice recognition software. I have made every reasonable attempt to correct obvious errors, but I expect that there are errors of grammar and possibly content that I did not discover before finalizing the note.    Electronically signed by FÁTIMA Woo on January 3, 2022

## 2022-01-04 LAB
CANDIDA DNA VAG QL PROBE+SIG AMP: NEGATIVE
G VAGINALIS DNA VAG QL PROBE+SIG AMP: NEGATIVE
T VAGINALIS DNA VAG QL PROBE+SIG AMP: NEGATIVE

## 2022-01-05 PROBLEM — M79.671 RIGHT FOOT PAIN: Status: ACTIVE | Noted: 2022-01-05

## 2022-01-05 PROBLEM — R23.2 HOT FLASHES: Status: ACTIVE | Noted: 2022-01-05

## 2022-01-05 PROBLEM — M54.9 MID BACK PAIN: Status: ACTIVE | Noted: 2022-01-05

## 2022-01-05 PROBLEM — M25.511 RIGHT SHOULDER PAIN: Status: ACTIVE | Noted: 2022-01-05

## 2022-01-05 PROBLEM — N89.8 VAGINAL ITCHING: Status: ACTIVE | Noted: 2022-01-05

## 2022-01-05 NOTE — ASSESSMENT & PLAN NOTE
Patient reports that she has never had a history of yeast vaginitis or bacterial vaginosis.  However, she went to visit her son in [place] in July and ever since then she has had some persistent itching with a rash around the vaginal region.  She reports that this does not happen every day.  Sweating and working out causes an increase.  Bounds at night improve her symptoms.  She reports that the discharge is white, but does not smell abnormal, there are no abnormal textures, and no other colors.

## 2022-01-05 NOTE — ASSESSMENT & PLAN NOTE
Patient has some chronic right shoulder pain.  Currently she does some stretches at home with some improvement.  Again, she is focused on caring for her mother and would like to defer further work-up for this at this time

## 2022-01-05 NOTE — ASSESSMENT & PLAN NOTE
Patient reports that she had a hysterectomy at age 39 or 40.  She is still having some hot flashes during the day.

## 2022-01-05 NOTE — ASSESSMENT & PLAN NOTE
Patient has a history of chronic mid back pain that has gotten significantly worse.  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  She notices that mild housework causes severe pain in the mid back, and this is gotten significantly worse over the last couple of months.  She does have some low back pain as well.  She takes Advil for the pain at night.

## 2022-01-05 NOTE — ASSESSMENT & PLAN NOTE
Patient has a history of bilateral carpal tunnel repair in 2014.  She uses wraps and braces, but does notice some persistent pain in the bilateral hands.  At this time, she is focusing on her mother and would like to defer referral back to an orthopedist

## 2022-01-05 NOTE — ASSESSMENT & PLAN NOTE
Patient reports that her most pressing issue at this time is her chronic right foot pain over a year.  She does note a bony prominence over the lateral aspect of her foot that is tender to palpation.  She does have some pain with walking.

## 2022-01-17 ENCOUNTER — HOSPITAL ENCOUNTER (OUTPATIENT)
Dept: LAB | Facility: MEDICAL CENTER | Age: 62
End: 2022-01-17
Payer: COMMERCIAL

## 2022-01-17 ENCOUNTER — HOSPITAL ENCOUNTER (OUTPATIENT)
Dept: RADIOLOGY | Facility: MEDICAL CENTER | Age: 62
End: 2022-01-17
Payer: COMMERCIAL

## 2022-01-17 DIAGNOSIS — E78.49 OTHER HYPERLIPIDEMIA: ICD-10-CM

## 2022-01-17 DIAGNOSIS — M54.50 CHRONIC MIDLINE LOW BACK PAIN WITHOUT SCIATICA: ICD-10-CM

## 2022-01-17 DIAGNOSIS — Z86.39 HISTORY OF HYPOTHYROIDISM: ICD-10-CM

## 2022-01-17 DIAGNOSIS — G89.29 CHRONIC MIDLINE THORACIC BACK PAIN: ICD-10-CM

## 2022-01-17 DIAGNOSIS — M79.671 RIGHT FOOT PAIN: ICD-10-CM

## 2022-01-17 DIAGNOSIS — G89.29 CHRONIC MIDLINE LOW BACK PAIN WITHOUT SCIATICA: ICD-10-CM

## 2022-01-17 DIAGNOSIS — M54.6 CHRONIC MIDLINE THORACIC BACK PAIN: ICD-10-CM

## 2022-01-17 LAB
CHOLEST SERPL-MCNC: 269 MG/DL (ref 100–199)
FASTING STATUS PATIENT QL REPORTED: NORMAL
HDLC SERPL-MCNC: 79 MG/DL
LDLC SERPL CALC-MCNC: 169 MG/DL
TRIGL SERPL-MCNC: 103 MG/DL (ref 0–149)
TSH SERPL DL<=0.005 MIU/L-ACNC: 2.17 UIU/ML (ref 0.38–5.33)

## 2022-01-17 PROCEDURE — 72072 X-RAY EXAM THORAC SPINE 3VWS: CPT

## 2022-01-17 PROCEDURE — 36415 COLL VENOUS BLD VENIPUNCTURE: CPT

## 2022-01-17 PROCEDURE — 73630 X-RAY EXAM OF FOOT: CPT | Mod: RT

## 2022-01-17 PROCEDURE — 84443 ASSAY THYROID STIM HORMONE: CPT

## 2022-01-17 PROCEDURE — 72100 X-RAY EXAM L-S SPINE 2/3 VWS: CPT

## 2022-01-17 PROCEDURE — 80061 LIPID PANEL: CPT

## 2022-01-24 ENCOUNTER — OFFICE VISIT (OUTPATIENT)
Dept: MEDICAL GROUP | Facility: PHYSICIAN GROUP | Age: 62
End: 2022-01-24
Payer: COMMERCIAL

## 2022-01-24 ENCOUNTER — HOSPITAL ENCOUNTER (OUTPATIENT)
Facility: MEDICAL CENTER | Age: 62
End: 2022-01-24
Payer: COMMERCIAL

## 2022-01-24 VITALS
HEART RATE: 72 BPM | BODY MASS INDEX: 24.63 KG/M2 | TEMPERATURE: 98.4 F | HEIGHT: 63 IN | SYSTOLIC BLOOD PRESSURE: 130 MMHG | WEIGHT: 139 LBS | DIASTOLIC BLOOD PRESSURE: 72 MMHG | OXYGEN SATURATION: 99 % | RESPIRATION RATE: 20 BRPM

## 2022-01-24 DIAGNOSIS — N72 CERVICITIS: ICD-10-CM

## 2022-01-24 DIAGNOSIS — H93.11 TINNITUS AURIUM, RIGHT: ICD-10-CM

## 2022-01-24 DIAGNOSIS — N76.0 BACTERIAL VAGINOSIS: ICD-10-CM

## 2022-01-24 DIAGNOSIS — E78.2 MIXED HYPERLIPIDEMIA: ICD-10-CM

## 2022-01-24 DIAGNOSIS — M79.671 RIGHT FOOT PAIN: ICD-10-CM

## 2022-01-24 DIAGNOSIS — Z01.411 ENCOUNTER FOR WELL WOMAN EXAM WITH ABNORMAL FINDINGS: ICD-10-CM

## 2022-01-24 DIAGNOSIS — B96.89 BACTERIAL VAGINOSIS: ICD-10-CM

## 2022-01-24 PROCEDURE — 99213 OFFICE O/P EST LOW 20 MIN: CPT | Mod: 25

## 2022-01-24 PROCEDURE — 87591 N.GONORRHOEAE DNA AMP PROB: CPT

## 2022-01-24 PROCEDURE — 99396 PREV VISIT EST AGE 40-64: CPT

## 2022-01-24 PROCEDURE — 88175 CYTOPATH C/V AUTO FLUID REDO: CPT

## 2022-01-24 PROCEDURE — 87491 CHLMYD TRACH DNA AMP PROBE: CPT

## 2022-01-24 RX ORDER — DOXYCYCLINE HYCLATE 100 MG
100 TABLET ORAL 2 TIMES DAILY
Qty: 14 TABLET | Refills: 0 | Status: SHIPPED | OUTPATIENT
Start: 2022-01-24 | End: 2022-01-31

## 2022-01-24 RX ORDER — FLUCONAZOLE 100 MG/1
100 TABLET ORAL
Qty: 2 TABLET | Refills: 0 | Status: SHIPPED | OUTPATIENT
Start: 2022-01-24 | End: 2022-01-30

## 2022-01-24 RX ORDER — METRONIDAZOLE 7.5 MG/G
1 GEL TOPICAL 2 TIMES DAILY
Qty: 45 G | Refills: 0 | Status: SHIPPED | OUTPATIENT
Start: 2022-01-24 | End: 2022-03-02

## 2022-01-24 RX ORDER — METHYLPREDNISOLONE 4 MG/1
TABLET ORAL
Qty: 21 TABLET | Refills: 0 | Status: SHIPPED | OUTPATIENT
Start: 2022-01-24 | End: 2022-03-02

## 2022-01-24 ASSESSMENT — FIBROSIS 4 INDEX: FIB4 SCORE: 1.09

## 2022-01-24 NOTE — PROGRESS NOTES
"  CC:   Chief Complaint   Patient presents with   • Gynecologic Exam      61 y.o. female for annual routine gynecologic exam as well as follow-up on her labs, foot pain, and ringing in the ears      Right foot pain  Patient reports that she never heard from the referral department regarding the referral placed last visit to Dr. Ramirez a [facility] orthopedics.  She is here to review x-ray results as well    Pressure sensation in left ear, left temporal area and left TM joint  Patient has a history of mastoiditis x2 as well as eustachian tube placement on the left.  She has had intermittent issues years, but notices an increase in ringing in the left ear that is more persistent.  She has tried Flonase previously      OB History    Para Term  AB Living   3 2 2 0 1 0   SAB IAB Ectopic Molar Multiple Live Births   0 0 0 0 0 0      Social History     Substance and Sexual Activity   Sexual Activity Not Currently       Past Medical History:   Diagnosis Date   • Allergic rhinitis    • Anesthesia     ponv   • Arthritis 2020    osteo- hands, knees, hips   • Cough    • Daytime sleepiness    • Eye drainage    • Fatigue    • Heartburn     very occ. diet related   • High cholesterol    • Hoarseness, persistent    • Hypertension    • MRSA (methicillin resistant staph aureus) culture positive     from spider bite- no open wounds as of 2018   • Painful joint    • Palpitations    • Ringing in ears    • Wears glasses        Allergies:Ciprofloxacin, Hydrocodone, Sulfa drugs, Latex, Other environmental, Hydrocodone-acetaminophen, and Statins [hmg-coa-r inhibitors]    Review of Systems: Otherwise negative except for as stated above.      Exam: /72 (BP Location: Left arm, Patient Position: Sitting, BP Cuff Size: Adult)   Pulse 72   Temp 36.9 °C (98.4 °F) (Temporal)   Resp 20   Ht 1.6 m (5' 3\")   Wt 63 kg (139 lb)   SpO2 99%  Body mass index is 24.62 kg/m².    Gen: Alert and oriented x4. Well developed, " well-nourished female in no apparent distress.  Skin: Warm, dry, good turgor, no rashes in visible areas or lacerations appreciated.   Eye: EOM intact, pupils equal, round and reactive, conjunctiva clear, lids normal.  Neck: Trachea midline, no masses, no thyromegaly  Lungs: Normal effort, CTA bilaterally, no wheezes, rhonchi, or rales. No stridor or audible wheezing. Equal chest expansion.   CV: Regular rate and rhythm. No murmurs, rubs, or gallops.  GI:  Soft, abdomen with no distention, tenderness to palpation over RLQ  : Perineum and external genitalia examined, Small erythematous region noted to the outer left labia majora.  Vagina with normal and physiologic discharge. Cervix without visible lesions or discharge, however strawberry in appearance. Bimanual exam without adnexal masses or cervical motion tenderness; absent uterus. Vaginal prolapse identified. Anus and perineum otherwise intact.   Pap is obtained and the specimen was sent to lab    MSK: Normal gait, moves all extremities.  Neuro: Alert and oriented x 4, non-focal exam with motor and sensory grossly intact.  Ext: No clubbing, cyanosis, edema.  Psych: Normal behavior, affect and mood.    A chaperone was offered to the patient during today's exam. Chaperone name: Kristi was present.      Assessment/Plan:  61 y.o. female with the following -    1. Cervicitis  Acute condition.  Patient is not currently sexually active, low risk for STIs.  Patient does have irritable cervix with strawberry appearance.  Doxycycline for 7 days patient also given, oral Diflucan to take after her course of antibiotic should she develop a yeast infection  - doxycycline (VIBRAMYCIN) 100 MG Tab; Take 1 Tablet by mouth 2 times a day for 7 days.  Dispense: 14 Tablet; Refill: 0    2. Bacterial vaginosis  Acute condition.  Discussed with patient that I would also like to cover for bacterial vaginosis due to the persistent discharge and irritation.  Advised patient to  discontinue her evening baths.  - metronidazole (METROGEL) 0.75 % gel; Apply 1 Application topically 2 times a day.  Dispense: 45 g; Refill: 0    3. Tinnitus aurium, left  Acute on chronic condition.  Patient reports that she is been dealing with ringing in the ear, particularly in the left ear.  She has a history of mastoiditis x2 and tube placement.  She reports that the rain has been getting somewhat more persistent.  Trial of steroids, she currently uses Flonase  - methylPREDNISolone (MEDROL DOSEPAK) 4 MG Tablet Therapy Pack; As directed on the packaging label.  Dispense: 21 Tablet; Refill: 0    4. Encounter for well woman exam with abnormal findings  Pap obtained and sent in for further evaluation.  - THINPREP RFLX HPV ASCUS W/CTNG; Future    5.  Mixed hyperlipidemia  Results for CLAY VALENTINE (MRN [id number]) as of 1/24/2022 13:03   Ref. Range 1/17/2022 13:04   Cholesterol,Tot Latest Ref Range: 100 - 199 mg/dL 269 (H)   Triglycerides Latest Ref Range: 0 - 149 mg/dL 103   HDL Latest Ref Range: >=40 mg/dL 79   LDL Latest Ref Range: <100 mg/dL 169 (H)   Chronic condition, progressive.  Discussed with patient that she does have elevated total cholesterol and LDL cholesterol as above.  Patient is going to start working on an exercise plan to reduce her numbers.  For now, continue Zetia 10 mg and cholestoff over-the-counter.  Recheck lipids in 6 months      6.  Right foot pain  Chronic condition, worsening. Reviewed referral, appears that referral was approved however referral was closed.  Referral department messaged for clarification.  Additionally, I gave the patient the number to contact Dr. Ramirez as her pain is worsened.  X-ray appears to reveal a bone spur to the distal first tarsal as well as small calcaneal spur and trace Achilles tendon enthesopathy.     Follow-up: Return in about 6 weeks (around 3/7/2022) for review lower and upper symptoms .    Health Maintenance: Completed      Please note that this  dictation was created using voice recognition software. I have made every reasonable attempt to correct obvious errors, but I expect that there are errors of grammar and possibly content that I did not discover before finalizing the note.    Electronically signed by ROOSEVELT Woo on 1/24/2022 at 1:13

## 2022-01-24 NOTE — ASSESSMENT & PLAN NOTE
Patient reports that she never heard from the referral department regarding the referral placed last visit to Dr. Ramirez a [facility] orthopedics.  She is here to review x-ray results as well

## 2022-01-24 NOTE — ASSESSMENT & PLAN NOTE
Patient has a history of mastoiditis x2 as well as eustachian tube placement on the left.  She has had intermittent issues years, but notices an increase in ringing in the left ear that is more persistent.  She has tried Flonase previously

## 2022-01-25 DIAGNOSIS — Z01.411 ENCOUNTER FOR WELL WOMAN EXAM WITH ABNORMAL FINDINGS: ICD-10-CM

## 2022-01-25 LAB
C TRACH DNA GENITAL QL NAA+PROBE: NEGATIVE
CYTOLOGY REG CYTOL: NORMAL
N GONORRHOEA DNA GENITAL QL NAA+PROBE: NEGATIVE
SPECIMEN SOURCE: NORMAL

## 2022-02-16 ENCOUNTER — OFFICE VISIT (OUTPATIENT)
Dept: PHYSICAL MEDICINE AND REHAB | Facility: MEDICAL CENTER | Age: 62
End: 2022-02-16
Payer: COMMERCIAL

## 2022-02-16 VITALS
BODY MASS INDEX: 25.27 KG/M2 | DIASTOLIC BLOOD PRESSURE: 70 MMHG | HEIGHT: 63 IN | OXYGEN SATURATION: 96 % | HEART RATE: 73 BPM | TEMPERATURE: 97.3 F | SYSTOLIC BLOOD PRESSURE: 118 MMHG | WEIGHT: 142.64 LBS

## 2022-02-16 DIAGNOSIS — M54.6 CHRONIC BILATERAL THORACIC BACK PAIN: ICD-10-CM

## 2022-02-16 DIAGNOSIS — G89.29 CHRONIC BILATERAL LOW BACK PAIN WITHOUT SCIATICA: ICD-10-CM

## 2022-02-16 DIAGNOSIS — M79.10 MYALGIA: ICD-10-CM

## 2022-02-16 DIAGNOSIS — G89.29 CHRONIC BILATERAL THORACIC BACK PAIN: ICD-10-CM

## 2022-02-16 DIAGNOSIS — M54.50 CHRONIC BILATERAL LOW BACK PAIN WITHOUT SCIATICA: ICD-10-CM

## 2022-02-16 DIAGNOSIS — M47.816 LUMBAR SPONDYLOSIS: ICD-10-CM

## 2022-02-16 PROCEDURE — 99204 OFFICE O/P NEW MOD 45 MIN: CPT | Performed by: PHYSICAL MEDICINE & REHABILITATION

## 2022-02-16 ASSESSMENT — FIBROSIS 4 INDEX: FIB4 SCORE: 1.09

## 2022-02-16 ASSESSMENT — PATIENT HEALTH QUESTIONNAIRE - PHQ9: CLINICAL INTERPRETATION OF PHQ2 SCORE: 0

## 2022-02-16 ASSESSMENT — PAIN SCALES - GENERAL: PAINLEVEL: 10=SEVERE PAIN

## 2022-02-16 NOTE — PROGRESS NOTES
New patient note    Interventional spine and Pain  Physiatry (physical medicine and  Rehabilitation)     Date of service: See epic    Chief complaint:   Chief Complaint   Patient presents with   • New Patient     Back pain        Referring provider: Bubba Taylor A.P.    HISTORY    HPI: Danii Rodriguez 61 y.o.  who presents today with Diagnoses of Lumbar spondylosis, Chronic bilateral low back pain without sciatica, Chronic bilateral thoracic back pain, and Myalgia were pertinent to this visit.    HPI    The patient was previously highly active involved in activities such as snow sports, waterskiing, hiking, softball.    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  During this time she has had increasing thoracic and low back pain.  These range from 4-8/10 in intensity and sometimes 10 out of 10 in intensity.  Nonradiating pains.  Chronic.       Medical records review:  I reviewed the note from the referring provider Bubba Taylor A.P.* including the note dated 1/3/2022, 1/24/2022.           ROS:   Positive for ringing in the ears  Red Flags ROS:   Fever, Chills, Sweats: Denies  Involuntary Weight Loss: Denies  Bladder Incontinence: Denies  Bowel Incontinence: denies  Saddle Anesthesia: Denies    All other systems reviewed and negative.       PMHx:   Past Medical History:   Diagnosis Date   • Allergic rhinitis    • Anesthesia     ponv   • Arthritis 07/2020    osteo- hands, knees, hips   • Cough    • Daytime sleepiness    • Eye drainage    • Fatigue    • Heartburn     very occ. diet related   • High cholesterol    • Hoarseness, persistent    • Hypertension    • MRSA (methicillin resistant staph aureus) culture positive 2010    from spider bite- no open wounds as of 2018   • Painful joint    • Palpitations    • Ringing in ears    • Wears glasses          Current Outpatient Medications on File Prior to Visit   Medication Sig  Dispense Refill   • albuterol 108 (90 Base) MCG/ACT Aero Soln inhalation aerosol albuterol sulfate HFA 90 mcg/actuation aerosol inhaler     • ezetimibe (ZETIA) 10 MG Tab Take 1 Tablet by mouth every day. 90 Tablet 0   • Magnesium 250 MG Tab Take 250 mg by mouth at bedtime.     • lisinopril (PRINIVIL) 20 MG Tab TAKE 1 TABLET BY MOUTH EVERY DAY 90 Tab 4   • ibuprofen (MOTRIN) 200 MG Tab Take 600 mg by mouth every day.     • fluticasone (FLONASE) 50 MCG/ACT nasal spray Spray 2 Sprays in nose every morning as needed (ALLERGY).     • NON SPECIFIED Inject 1 Each as directed Q30 DAYS. SEASONAL SHOT     • Plant Sterols and Stanols (CHOLESTOFF PO) Take 1 Tab by mouth 2 Times a Day.     • coenzyme Q-10 30 MG capsule Take 30 mg by mouth every morning.     • Multiple Vitamins-Minerals (WOMENS 50+ MULTI VITAMIN/MIN PO) Take 1 Tab by mouth every morning.     • Cholecalciferol (VITAMIN D3) 2000 UNIT Cap Take 4,000 Units by mouth every day.     • metronidazole (METROGEL) 0.75 % gel Apply 1 Application topically 2 times a day. 45 g 0   • methylPREDNISolone (MEDROL DOSEPAK) 4 MG Tablet Therapy Pack As directed on the packaging label. 21 Tablet 0   • LISINOPRIL PO lisinopril     • ketorolac (TORADOL) 10 MG Tab ketorolac 10 mg tablet     • triamcinolone acetonide (KENALOG) 0.1 % Ointment triamcinolone acetonide 0.1 % topical ointment   PLEASE SEE ATTACHED FOR DETAILED DIRECTIONS     • NON SPECIFIED Topical cream for face     • scopolamine (TRANSDERM-SCOP) 1 mg/72hr PATCH 72 HR Apply 1 Patch to skin as directed every 3 days as needed (NAUSEA).     • Probiotic Product (PROBIOTIC COLON SUPPORT PO) Take 1 Capsule by mouth every morning.       No current facility-administered medications on file prior to visit.        PSHx:   Past Surgical History:   Procedure Laterality Date   • FINGER ARTHROPLASTY Left 7/28/2020    Procedure: ARTHROPLASTY, FINGER - THUMB CARPOMETACARPAL EXCISIONAL;  Surgeon: Edgar Leblanc M.D.;  Location: SURGERY [facility];  Service: Orthopedics   • TENDON TRANSFER Left 2020    Procedure: TRANSFER, TENDON - FLEXOR CARPI RADIALIS;  Surgeon: Edgar Leblanc M.D.;  Location: [facility];  Service: Orthopedics   • CORTISONE INJECTION Bilateral 2020    Procedure: NJECTION RIGHT THUMB AND BILATERAL CARPAL TUNNELS;  Surgeon: Edgar Leblanc M.D.;  Location: SURGERY [facility];  Service: Orthopedics   • JOINT INJECTION DIAGNOSTIC Bilateral 2018    Procedure: JOINT INJECTION DIAGNOSTIC - [facility] JOINTS;  Surgeon: Edgar Leblanc M.D.;  Location: [facility];  Service: Orthopedics   • TRIGGER FINGER RELEASE Right 2018    Procedure: TRIGGER FINGER RELEASE - THUMB;  Surgeon: Edgar Leblanc M.D.;  Location: [facility];  Service: Orthopedics   • CARPAL TUNNEL ENDOSCOPIC  2014    Both side Performed by Edgar Leblanc M.D. at [facility]   • ABDOMINAL HYSTERECTOMY TOTAL      Hysterectomy and removal Ovaries and tubes, Total Abdominal in    • BONE GRAFT      bone graft surgery on left second toe in  due to fracture   • CYSTOSCOPY      Removal of polyps from urinary bladder and dilation of urethra in    • INCISION AND DRAINAGE ENT      I&D of left ear for ear infection twice in  and  by Dr Kahn, ENT   • NASAL SEPTAL RECONSTRUCTION             Family history   Family History   Problem Relation Age of Onset   • Cancer Paternal Grandmother    • Other Paternal Grandmother         lupus   • Heart Disease Paternal Grandmother          70s   • Cancer Paternal Aunt    • Other Mother         Parkinson disease   • Bipolar disorder Mother    • Hypertension Mother    • Thyroid Mother         Hypothyroid   • Heart Disease Father         Congenital heart disease, CHF   • Hypertension Father    • Diabetes Father    • Stroke Sister         age 53, smoker   • Bipolar disorder Sister    • Other Sister         alcoholic          Medications:  reviewed on epic.   Outpatient Medications Marked as Taking for the 22 encounter (Office Visit) with Erik Vincent M.D.   Medication Sig Dispense Refill   • albuterol 108 (90 Base) MCG/ACT Aero Soln inhalation aerosol albuterol sulfate HFA 90 mcg/actuation aerosol inhaler     • ezetimibe (ZETIA) 10 MG Tab Take 1 Tablet by mouth every day. 90 Tablet 0   • Magnesium 250 MG Tab Take 250 mg by mouth at bedtime.     • lisinopril (PRINIVIL) 20 MG Tab TAKE 1 TABLET BY MOUTH EVERY DAY 90 Tab 4   • ibuprofen (MOTRIN) 200 MG Tab Take 600 mg by mouth every day.     • fluticasone (FLONASE) 50 MCG/ACT nasal spray Spray 2 Sprays in nose every morning as needed (ALLERGY).     • NON SPECIFIED Inject 1 Each as directed Q30 DAYS. SEASONAL SHOT     • Plant Sterols and Stanols (CHOLESTOFF PO) Take 1 Tab by mouth 2 Times a Day.     • coenzyme Q-10 30 MG capsule Take 30 mg by mouth every morning.     • Multiple Vitamins-Minerals (WOMENS 50+ MULTI VITAMIN/MIN PO) Take 1 Tab by mouth every morning.     • Cholecalciferol (VITAMIN D3) 2000 UNIT Cap Take 4,000 Units by mouth every day.          Allergies:   Allergies   Allergen Reactions   • Ciprofloxacin Hives and Nausea     Okay to take cipro ear drops, no reaction with ear drop   • Hydrocodone Rash     RASH     • Sulfa Drugs Rash and Itching     Not had but was told not to use   • Latex Rash and Itching     Tape burns and rash- paper tape OK   • Other Environmental Itching     seasonal   • Hydrocodone-Acetaminophen    • Statins [Hmg-Coa-R Inhibitors] Unspecified     Hypertensive Crisis       Social Hx:   Social History     Socioeconomic History   • Marital status:      Spouse name: Not on file   • Number of children: Not on file   • Years of education: Not on file   • Highest education level: Not on file   Occupational History   • Not on file   Tobacco Use   • Smoking status: Former Smoker     Packs/day: 0.00     Quit date: 1976     Years since quittin.1   •  "Smokeless tobacco: Never Used   Vaping Use   • Vaping Use: Never used   Substance and Sexual Activity   • Alcohol use: Yes     Alcohol/week: 3.6 oz     Types: 6 Glasses of wine per week     Comment: Occasionally    • Drug use: No   • Sexual activity: Not Currently   Other Topics Concern   • Not on file   Social History Narrative   • Not on file     Social Determinants of Health     Financial Resource Strain: Not on file   Food Insecurity: Not on file   Transportation Needs: Not on file   Physical Activity: Not on file   Stress: Not on file   Social Connections: Not on file   Intimate Partner Violence: Not on file   Housing Stability: Not on file         EXAMINATION     Physical Exam:   Vitals: /70 (BP Location: Right arm, Patient Position: Sitting, BP Cuff Size: Adult)   Pulse 73   Temp 36.3 °C (97.3 °F) (Temporal)   Ht 1.6 m (5' 3\")   Wt 64.7 kg (142 lb 10.2 oz)   SpO2 96%     Constitutional:   Body Habitus: Body mass index is 25.27 kg/m².  Cooperation: Fully cooperates with exam  Appearance: Well-groomed, well-nourished, not disheveled     Eyes: No scleral icterus to suggest severe liver disease, no proptosis to suggest severe hyperthyroid    ENT -no obvious auditory deficits, no obvious tongue lesions, tongue midline, no facial droop     Skin -no rashes or lesions noted     Respiratory-  breathing comfortable on room air, no audible wheezing    Cardiovascular- capillary refills less than 2 seconds.     Psychiatric- alert and oriented ×3. Normal affect.     Gait - normal gait, no use of ambulatory device, nonantalgic. the patient can toe walk with ease. the patient can heel walk with ease. the patient can tandem walk with ease. balance is appropriate..     Musculoskeletal and Neuro -     Thoracic/Lumbar Spine/Sacral Spine/Hips   Inspection: No evidence of atrophy in bilateral lower extremities throughout     ROM: full  active range of motion with flexion, lateral flexion, and rotation bilaterally. "   There is decreased active range of motion with lumbar extension with pain.    There is pain with facet loading maneuver (extension rotation) with axial low back pain on the BILATERAL side(s)    Palpation:   There is no tenderness to the palpation along the midline along any bony prominence.  Mild tenderness palpation in the paraspinous region in the lower thoracic region.  palpation over SI joint: negative bilaterally    palpation in hip or over the gluteus medius tendon insertion: negative bilaterally      Lumbar spine Special tests  Neuro tension  Straight leg test negative bilaterally    Slump test negative bilaterally      HIP  FAIR test negative bilaterally    Range of motion in the RIGHT hip is full  in flexion, extension, abduction, internal rotation, external rotation.  Range of motion in the LEFT hip is full  in flexion, extension, abduction, internal rotation, external rotation.      SI joint tests  Observation patient sits on one buttocks: Negative  SI joint compression negative bilaterally    SI joint distraction negative bilaterally    Thigh thrust test negative bilaterally    PATRIC test negative bilaterally                 Key points for the international standards for neurological classification of spinal cord injury (ISNCSCI) to light touch.     Dermatome R L                                      L2 2 2   L3 2 2   L4 2 2   L5 2 2   S1 2 2   S2 2 2       Motor Exam Lower Extremities    ? Myotome R L   Hip flexion L2 5 5   Knee extension L3 5 5   Ankle dorsiflexion L4 5 5   Toe extension L5 5 5   Ankle plantarflexion S1 5 5         Reflexes  ?  R L                Patella  2+ 2+   Achilles   2+ 2+       Babinski sign negative bilaterally   Clonus of the ankle negative bilaterally       MEDICAL DECISION MAKING    Medical records review: see under HPI section.     DATA    Labs:   Lab Results   Component Value Date/Time    SODIUM 139 10/21/2021 09:15 AM    POTASSIUM 4.8 10/21/2021 09:15 AM    CHLORIDE 104  10/21/2021 09:15 AM    CO2 24 10/21/2021 09:15 AM    ANION 11.0 10/21/2021 09:15 AM    GLUCOSE 83 10/21/2021 09:15 AM    BUN 20 10/21/2021 09:15 AM    CREATININE 0.92 10/21/2021 09:15 AM    CALCIUM 10.0 10/21/2021 09:15 AM    ASTSGOT 21 10/21/2021 09:15 AM    ALTSGPT 24 10/21/2021 09:15 AM    TBILIRUBIN 0.6 10/21/2021 09:15 AM    ALBUMIN 4.9 10/21/2021 09:15 AM    TOTPROTEIN 7.1 10/21/2021 09:15 AM    GLOBULIN 2.2 10/21/2021 09:15 AM    AGRATIO 2.2 10/21/2021 09:15 AM   ]    Lab Results   Component Value Date/Time    PROTHROMBTM 11.9 (L) 10/09/2019 04:15 AM    INR 0.86 (L) 10/09/2019 04:15 AM        Lab Results   Component Value Date/Time    WBC 5.7 05/15/2020 10:25 AM    RBC 4.74 05/15/2020 10:25 AM    HEMOGLOBIN 14.4 05/15/2020 10:25 AM    HEMATOCRIT 42.7 05/15/2020 10:25 AM    MCV 90.1 05/15/2020 10:25 AM    MCH 30.4 05/15/2020 10:25 AM    MCHC 33.7 05/15/2020 10:25 AM    MPV 10.6 05/15/2020 10:25 AM    NEUTSPOLYS 52.50 05/15/2020 10:25 AM    LYMPHOCYTES 39.50 05/15/2020 10:25 AM    MONOCYTES 5.70 05/15/2020 10:25 AM    EOSINOPHILS 1.60 05/15/2020 10:25 AM    BASOPHILS 0.50 05/15/2020 10:25 AM        Lab Results   Component Value Date/Time    HBA1C 5.4 05/15/2020 10:25 AM        Imaging:   I personally reviewed following images, these are my reads    X-ray lumbar spine 1/17/2022  There is facet arthropathy worst at L3-4, L4-5 and L5-S1 bilaterally.  No areas of high-grade degenerative disc disease.  No acute fractures are seen.        IMAGING radiology reads. I reviewed the following radiology reads          Results for orders placed during the hospital encounter of 03/15/04    DX-CHEST-2 VIEWS    Impression  IMPRESSION:      1.   NEGATIVE TWO VIEWS OF THE CHEST.      This report was faxed to [phone] on March 15, 2004, lvd              READING DR:        CLARA ALVAREZ MD  READING DATE:      Mar 15 2004  3:53PM  REPORT STATUS:     FINAL REPORT    TRANSCRIPTION CODE:         LVD  TRANSCRIPTION DATE:          Mar 16 2004  3:42AM    THIS DOCUMENT HAS BEEN ELECTRONICALLY SIGNED BY:  CLARA ALVAREZ MD -  Mar 16 2004  9:17AM              Results for orders placed during the hospital encounter of 01/17/22    DX-LUMBAR SPINE-2 OR 3 VIEWS    Impression  Lower lumbar spine facet arthropathy.              Results for orders placed during the hospital encounter of 01/17/22    DX-THORACIC SPINE-WITH SWIMMERS VIEW    Impression  Unremarkable thoracic spine.             Diagnosis   Visit Diagnoses     ICD-10-CM   1. Lumbar spondylosis  M47.816   2. Chronic bilateral low back pain without sciatica  M54.50    G89.29   3. Chronic bilateral thoracic back pain  M54.6    G89.29   4. Myalgia  M79.10           ASSESSMENT AND PLAN:  Danii Rodriguez 61 y.o. female      Danii was seen today for new patient.    Diagnoses and all orders for this visit:    Lumbar spondylosis  -     Referral to Physical Therapy    Chronic bilateral low back pain without sciatica  -     Referral to Physical Therapy    Chronic bilateral thoracic back pain  -     Referral to Physical Therapy    Myalgia  -     Referral to Physical Therapy        Neurologically the patient is intact.  I believe it is reasonable to proceed with physical therapy.  She is getting improvement of meloxicam with her baseline pain.  For now I think it is reasonable to continue that.  For the low back pain I believe this is mostly facet mediated pain.    Physical therapy: I ordered physical therapy to focus on strengthening and stretching.     home exercise program: I provided the patient with a strengthening and stretching with a home exercise program     Diagnostic workup: Consider MRI lumbar spine if the patient fails conservative treatments above    Medications:   He is reasonable to continue meloxicam for now.    Interventional program: I would consider the patient for an diagnostic medial branch block of the most affected levels depending on the patient's response to conservative  treatments and if we have an MRI         Follow-up: After the above conservative treatments          Please note that this dictation was created using voice recognition software. I have made every reasonable attempt to correct obvious errors but there may be errors of grammar and content that I may have overlooked prior to finalization of this note.      Erik Vincent MD  Physical Medicine and Rehabilitation  Interventional Spine and Sports Physiatry  [facility] Bubba Taylor A.P.

## 2022-03-01 SDOH — HEALTH STABILITY: PHYSICAL HEALTH: ON AVERAGE, HOW MANY MINUTES DO YOU ENGAGE IN EXERCISE AT THIS LEVEL?: 30 MIN

## 2022-03-01 SDOH — ECONOMIC STABILITY: HOUSING INSECURITY: IN THE LAST 12 MONTHS, HOW MANY PLACES HAVE YOU LIVED?: 1

## 2022-03-01 SDOH — ECONOMIC STABILITY: TRANSPORTATION INSECURITY
IN THE PAST 12 MONTHS, HAS LACK OF TRANSPORTATION KEPT YOU FROM MEETINGS, WORK, OR FROM GETTING THINGS NEEDED FOR DAILY LIVING?: NO

## 2022-03-01 SDOH — ECONOMIC STABILITY: TRANSPORTATION INSECURITY
IN THE PAST 12 MONTHS, HAS LACK OF RELIABLE TRANSPORTATION KEPT YOU FROM MEDICAL APPOINTMENTS, MEETINGS, WORK OR FROM GETTING THINGS NEEDED FOR DAILY LIVING?: NO

## 2022-03-01 SDOH — ECONOMIC STABILITY: HOUSING INSECURITY
IN THE LAST 12 MONTHS, WAS THERE A TIME WHEN YOU DID NOT HAVE A STEADY PLACE TO SLEEP OR SLEPT IN A SHELTER (INCLUDING NOW)?: NO

## 2022-03-01 SDOH — ECONOMIC STABILITY: INCOME INSECURITY: IN THE LAST 12 MONTHS, WAS THERE A TIME WHEN YOU WERE NOT ABLE TO PAY THE MORTGAGE OR RENT ON TIME?: NO

## 2022-03-01 SDOH — ECONOMIC STABILITY: TRANSPORTATION INSECURITY
IN THE PAST 12 MONTHS, HAS THE LACK OF TRANSPORTATION KEPT YOU FROM MEDICAL APPOINTMENTS OR FROM GETTING MEDICATIONS?: NO

## 2022-03-01 SDOH — ECONOMIC STABILITY: FOOD INSECURITY: WITHIN THE PAST 12 MONTHS, THE FOOD YOU BOUGHT JUST DIDN'T LAST AND YOU DIDN'T HAVE MONEY TO GET MORE.: NEVER TRUE

## 2022-03-01 SDOH — HEALTH STABILITY: MENTAL HEALTH
STRESS IS WHEN SOMEONE FEELS TENSE, NERVOUS, ANXIOUS, OR CAN'T SLEEP AT NIGHT BECAUSE THEIR MIND IS TROUBLED. HOW STRESSED ARE YOU?: NOT AT ALL

## 2022-03-01 SDOH — HEALTH STABILITY: PHYSICAL HEALTH: ON AVERAGE, HOW MANY DAYS PER WEEK DO YOU ENGAGE IN MODERATE TO STRENUOUS EXERCISE (LIKE A BRISK WALK)?: 3 DAYS

## 2022-03-01 SDOH — ECONOMIC STABILITY: INCOME INSECURITY: HOW HARD IS IT FOR YOU TO PAY FOR THE VERY BASICS LIKE FOOD, HOUSING, MEDICAL CARE, AND HEATING?: NOT HARD AT ALL

## 2022-03-01 SDOH — ECONOMIC STABILITY: FOOD INSECURITY: WITHIN THE PAST 12 MONTHS, YOU WORRIED THAT YOUR FOOD WOULD RUN OUT BEFORE YOU GOT MONEY TO BUY MORE.: NEVER TRUE

## 2022-03-01 ASSESSMENT — SOCIAL DETERMINANTS OF HEALTH (SDOH)
WITHIN THE PAST 12 MONTHS, YOU WORRIED THAT YOUR FOOD WOULD RUN OUT BEFORE YOU GOT THE MONEY TO BUY MORE: NEVER TRUE
HOW OFTEN DO YOU HAVE A DRINK CONTAINING ALCOHOL: 2-3 TIMES A WEEK
IN A TYPICAL WEEK, HOW MANY TIMES DO YOU TALK ON THE PHONE WITH FAMILY, FRIENDS, OR NEIGHBORS?: MORE THAN THREE TIMES A WEEK
HOW HARD IS IT FOR YOU TO PAY FOR THE VERY BASICS LIKE FOOD, HOUSING, MEDICAL CARE, AND HEATING?: NOT HARD AT ALL
HOW OFTEN DO YOU ATTEND CHURCH OR RELIGIOUS SERVICES?: NEVER
HOW OFTEN DO YOU GET TOGETHER WITH FRIENDS OR RELATIVES?: TWICE A WEEK
HOW MANY DRINKS CONTAINING ALCOHOL DO YOU HAVE ON A TYPICAL DAY WHEN YOU ARE DRINKING: 1 OR 2
IN A TYPICAL WEEK, HOW MANY TIMES DO YOU TALK ON THE PHONE WITH FAMILY, FRIENDS, OR NEIGHBORS?: MORE THAN THREE TIMES A WEEK
DO YOU BELONG TO ANY CLUBS OR ORGANIZATIONS SUCH AS CHURCH GROUPS UNIONS, FRATERNAL OR ATHLETIC GROUPS, OR SCHOOL GROUPS?: NO
HOW OFTEN DO YOU ATTENT MEETINGS OF THE CLUB OR ORGANIZATION YOU BELONG TO?: NEVER
HOW OFTEN DO YOU GET TOGETHER WITH FRIENDS OR RELATIVES?: TWICE A WEEK
HOW OFTEN DO YOU ATTENT MEETINGS OF THE CLUB OR ORGANIZATION YOU BELONG TO?: NEVER
DO YOU BELONG TO ANY CLUBS OR ORGANIZATIONS SUCH AS CHURCH GROUPS UNIONS, FRATERNAL OR ATHLETIC GROUPS, OR SCHOOL GROUPS?: NO
HOW OFTEN DO YOU ATTEND CHURCH OR RELIGIOUS SERVICES?: NEVER

## 2022-03-01 ASSESSMENT — LIFESTYLE VARIABLES
HOW MANY STANDARD DRINKS CONTAINING ALCOHOL DO YOU HAVE ON A TYPICAL DAY: 1 OR 2
HOW OFTEN DO YOU HAVE A DRINK CONTAINING ALCOHOL: 2-3 TIMES A WEEK

## 2022-03-02 ENCOUNTER — OFFICE VISIT (OUTPATIENT)
Dept: MEDICAL GROUP | Facility: PHYSICIAN GROUP | Age: 62
End: 2022-03-02
Payer: COMMERCIAL

## 2022-03-02 VITALS
OXYGEN SATURATION: 98 % | WEIGHT: 142.13 LBS | TEMPERATURE: 97.5 F | DIASTOLIC BLOOD PRESSURE: 70 MMHG | RESPIRATION RATE: 18 BRPM | HEIGHT: 63 IN | BODY MASS INDEX: 25.18 KG/M2 | SYSTOLIC BLOOD PRESSURE: 120 MMHG | HEART RATE: 76 BPM

## 2022-03-02 DIAGNOSIS — H93.11 TINNITUS OF RIGHT EAR: ICD-10-CM

## 2022-03-02 DIAGNOSIS — M54.9 MID BACK PAIN: ICD-10-CM

## 2022-03-02 DIAGNOSIS — H93.11 TINNITUS AURIUM, RIGHT: ICD-10-CM

## 2022-03-02 DIAGNOSIS — R23.2 HOT FLASHES: ICD-10-CM

## 2022-03-02 DIAGNOSIS — N89.8 VAGINAL ITCHING: ICD-10-CM

## 2022-03-02 DIAGNOSIS — M79.671 RIGHT FOOT PAIN: ICD-10-CM

## 2022-03-02 DIAGNOSIS — E78.2 MIXED HYPERLIPIDEMIA: ICD-10-CM

## 2022-03-02 PROCEDURE — 99214 OFFICE O/P EST MOD 30 MIN: CPT

## 2022-03-02 RX ORDER — SERTRALINE HYDROCHLORIDE 25 MG/1
25 TABLET, FILM COATED ORAL DAILY
Qty: 30 TABLET | Refills: 11 | Status: SHIPPED | OUTPATIENT
Start: 2022-03-02 | End: 2022-06-13

## 2022-03-02 RX ORDER — ESTRADIOL 0.03 MG/D
1 FILM, EXTENDED RELEASE TRANSDERMAL
Qty: 8 PATCH | Refills: 1 | Status: SHIPPED | OUTPATIENT
Start: 2022-03-02 | End: 2022-04-01

## 2022-03-02 ASSESSMENT — FIBROSIS 4 INDEX: FIB4 SCORE: 1.09

## 2022-03-02 NOTE — PROGRESS NOTES
CC:   Chief Complaint   Patient presents with   • Follow-Up     Cervical infection recheck        HISTORY OF PRESENT ILLNESS: Patient is a 61 y.o. female established patient who presents today to discuss the following problems below:     Vaginal itching  Patient completed the course of doxycycline and Flagyl.  She feels like she has had some progress in the level of itchiness, but it does remain persistent.    Hot flashes  Patient reports that she has had an increase in her hot flashes and night sweats.  She tried some herbal remedies, but these have not been helpful.  She was previously on a progesterone only patch by her gynecologist, but then discontinued this.  History of hysterectomy    Tinnitus of right ear  Patient was given a course of oral steroids last visit to help with some tinnitus.  She reports that it did clear up the congestion that she felt in her ears, but she still does have some persistent tinnitus that is quite irritating    Right foot pain  Patient was referred to Dr. Ramirez last visit for her chronic right foot pain.  She was given an orthotic to put in her shoe and uses an orthotic flip-flop as well, which is significantly improved with the amount of burning pain.  She continues on the meloxicam with good relief    Mid back pain  Patient was evaluated by Dr. Vincent, plan is to start physical therapy next week for her chronic mid back pain.      Past Medical History:   Diagnosis Date   • Allergic rhinitis    • Anesthesia     ponv   • Arthritis 07/2020    osteo- hands, knees, hips   • Cough    • Daytime sleepiness    • Eye drainage    • Fatigue    • Heartburn     very occ. diet related   • High cholesterol    • Hoarseness, persistent    • Hypertension    • MRSA (methicillin resistant staph aureus) culture positive 2010    from spider bite- no open wounds as of 2018   • Painful joint    • Palpitations    • Ringing in ears    • Wears glasses        Allergies:Ciprofloxacin, Hydrocodone, Sulfa  "drugs, Latex, Other environmental, Hydrocodone-acetaminophen, and Statins [hmg-coa-r inhibitors]    Review of Systems: Otherwise negative except for as stated above.      Exam: /70 (BP Location: Left arm, Patient Position: Sitting, BP Cuff Size: Adult)   Pulse 76   Temp 36.4 °C (97.5 °F) (Temporal)   Resp 18   Ht 1.6 m (5' 3\")   Wt 64.5 kg (142 lb 2 oz)   SpO2 98%  Body mass index is 25.18 kg/m².    Gen: Alert and oriented x4. Well developed, well-nourished female in no apparent distress.  Skin: Warm, dry, good turgor, no rashes in visible areas or lacerations appreciated.   Eye: EOM intact, pupils equal, round and reactive, conjunctiva clear, lids normal.  Neck: Trachea midline, no masses, no thyromegaly  GI:  Soft, non-tender abdomen with no distention.   MSK: Normal gait, moves all extremities.  Neuro: Alert and oriented x 4, non-focal exam with motor and sensory grossly intact.  Ext: No clubbing, cyanosis, edema.  Psych: Normal behavior, affect and mood.      Assessment/Plan:  61 y.o. female with the following -    1. Tinnitus aurium, right  Chronic condition, worsened.  Discussed with patient that we could trial a very low-dose sertraline for management of the tinnitus in her right ear.  Patient is willing to try this  - sertraline (ZOLOFT) 25 MG tablet; Take 1 Tablet by mouth every day.  Dispense: 30 Tablet; Refill: 11    2. Mixed hyperlipidemia  Chronic condition.  Patient continues on Zetia and Cholest off.  She is  - Lipid Profile; Future    3. Hot flashes  Chronic condition, not well managed.  Plan for patient to begin estrogen patch.  Discussed risks and benefits of patch, patient elects to proceed with patch  - Estradiol 0.025 MG/24HR PATCH BIWEEKLY; Place 1 Patch on the skin every 3 days for 30 days.  Dispense: 8 Patch; Refill: 1 well    4. Vaginal itching  Chronic condition, stable.  Improved from last visit.  But still with persistent itching.  Discussed results of Pap, results show " atrophy.  Patient elects to proceed with Premarin vaginal cream.  Sent in to    5. Right foot pain  Chronic condition, managed with foot inserts and meloxicam for now.  Continue to follow with Dr. Ramirez    6. Mid back pain  Chronic condition, stable.  Continue physical therapy as directed by Dr. Vincent    Follow-up: Return in about 3 months (around 6/2/2022) for recheck lipids.    Health Maintenance: Completed      Please note that this dictation was created using voice recognition software. I have made every reasonable attempt to correct obvious errors, but I expect that there are errors of grammar and possibly content that I did not discover before finalizing the note.    Electronically signed by FÁTIMA Woo on March 2, 2022

## 2022-03-03 DIAGNOSIS — J30.2 SEASONAL ALLERGIES: ICD-10-CM

## 2022-03-04 PROBLEM — H93.11 TINNITUS OF RIGHT EAR: Status: ACTIVE | Noted: 2022-03-04

## 2022-03-04 RX ORDER — CONJUGATED ESTROGENS 0.62 MG/G
CREAM VAGINAL
Qty: 30 G | Refills: 1 | Status: SHIPPED | OUTPATIENT
Start: 2022-03-04 | End: 2022-03-07

## 2022-03-05 NOTE — ASSESSMENT & PLAN NOTE
Patient was referred to Dr. Ramirez last visit for her chronic right foot pain.  She was given an orthotic to put in her shoe and uses an orthotic flip-flop as well, which is significantly improved with the amount of burning pain.  She continues on the meloxicam with good relief

## 2022-03-05 NOTE — ASSESSMENT & PLAN NOTE
Patient completed the course of doxycycline and Flagyl.  She feels like she has had some progress in the level of itchiness, but it does remain persistent.

## 2022-03-05 NOTE — ASSESSMENT & PLAN NOTE
Patient was evaluated by Dr. Vincent, plan is to start physical therapy next week for her chronic mid back pain.

## 2022-03-05 NOTE — ASSESSMENT & PLAN NOTE
Patient reports that she has had an increase in her hot flashes and night sweats.  She tried some herbal remedies, but these have not been helpful.  She was previously on a progesterone only patch by her gynecologist, but then discontinued this.  History of hysterectomy

## 2022-03-05 NOTE — ASSESSMENT & PLAN NOTE
Patient was given a course of oral steroids last visit to help with some tinnitus.  She reports that it did clear up the congestion that she felt in her ears, but she still does have some persistent tinnitus that is quite irritating

## 2022-03-07 DIAGNOSIS — N89.8 VAGINAL ITCHING: ICD-10-CM

## 2022-03-07 RX ORDER — CONJUGATED ESTROGENS 0.62 MG/G
CREAM VAGINAL
Qty: 30 G | Refills: 1 | Status: SHIPPED | OUTPATIENT
Start: 2022-03-07

## 2022-03-07 RX ORDER — MELOXICAM 15 MG/1
TABLET ORAL
COMMUNITY
Start: 2022-02-24 | End: 2022-03-16

## 2022-03-11 ENCOUNTER — APPOINTMENT (OUTPATIENT)
Dept: PHYSICAL THERAPY | Facility: REHABILITATION | Age: 62
End: 2022-03-11
Payer: COMMERCIAL

## 2022-03-11 ENCOUNTER — OFFICE VISIT (OUTPATIENT)
Dept: URGENT CARE | Facility: PHYSICIAN GROUP | Age: 62
End: 2022-03-11
Payer: COMMERCIAL

## 2022-03-11 VITALS
RESPIRATION RATE: 16 BRPM | SYSTOLIC BLOOD PRESSURE: 120 MMHG | OXYGEN SATURATION: 98 % | TEMPERATURE: 97.6 F | WEIGHT: 142 LBS | BODY MASS INDEX: 25.16 KG/M2 | HEART RATE: 68 BPM | DIASTOLIC BLOOD PRESSURE: 70 MMHG | HEIGHT: 63 IN

## 2022-03-11 DIAGNOSIS — R10.30 LOWER ABDOMINAL PAIN: ICD-10-CM

## 2022-03-11 DIAGNOSIS — R35.0 URINARY FREQUENCY: ICD-10-CM

## 2022-03-11 PROBLEM — M76.70 PERONEAL TENDINITIS: Status: ACTIVE | Noted: 2022-03-09

## 2022-03-11 LAB
APPEARANCE UR: CLEAR
BILIRUB UR STRIP-MCNC: NEGATIVE MG/DL
COLOR UR AUTO: NORMAL
GLUCOSE UR STRIP.AUTO-MCNC: NEGATIVE MG/DL
KETONES UR STRIP.AUTO-MCNC: NEGATIVE MG/DL
LEUKOCYTE ESTERASE UR QL STRIP.AUTO: NEGATIVE
NITRITE UR QL STRIP.AUTO: NEGATIVE
PH UR STRIP.AUTO: 6 [PH] (ref 5–8)
PROT UR QL STRIP: NEGATIVE MG/DL
RBC UR QL AUTO: NEGATIVE
SP GR UR STRIP.AUTO: 1
UROBILINOGEN UR STRIP-MCNC: 0.2 MG/DL

## 2022-03-11 PROCEDURE — 99214 OFFICE O/P EST MOD 30 MIN: CPT | Performed by: PHYSICIAN ASSISTANT

## 2022-03-11 PROCEDURE — 81002 URINALYSIS NONAUTO W/O SCOPE: CPT | Performed by: PHYSICIAN ASSISTANT

## 2022-03-11 ASSESSMENT — ENCOUNTER SYMPTOMS
CONSTIPATION: 0
DIARRHEA: 0
ABDOMINAL PAIN: 1
EYE DISCHARGE: 0
NAUSEA: 1
COUGH: 0
HEADACHES: 1
EYE REDNESS: 0
BLOOD IN STOOL: 0
SORE THROAT: 0
FEVER: 0
SHORTNESS OF BREATH: 0
VOMITING: 0
FLANK PAIN: 1

## 2022-03-11 ASSESSMENT — FIBROSIS 4 INDEX: FIB4 SCORE: 1.09

## 2022-03-11 NOTE — OP THERAPY EVALUATION
Outpatient Physical Therapy  INITIAL EVALUATION    [facility] Physical Therapy [address].  [address]  [place] [address]  Phone:  [phone]  Fax:  [phone]    Date of Evaluation: 03/11/2022    Patient: Danii Rodriguez  YOB: 1960  MRN: [id number]     Referring Provider: PAN Abdul  [address] [place] [address]   Referring Diagnosis Chronic midline low back pain without sciatica [M54.50, G89.29]     Time Calculation                 Chief Complaint: No chief complaint on file.    Visit Diagnoses     ICD-10-CM   1. Lumbar spondylosis  M47.816   2. Chronic bilateral low back pain without sciatica  M54.50    G89.29   3. Chronic bilateral thoracic back pain  M54.6    G89.29   4. Myalgia  M79.10       Date of onset of impairment: No data found    Subjective:   History of Present Illness:     Mechanism of injury:  What brings you in:     Location:     Duration:     Characteristics:     Aggravating/alleiviating       Past Medical History:   Diagnosis Date   • Allergic rhinitis    • Anesthesia     ponv   • Arthritis 07/2020    osteo- hands, knees, hips   • Cough    • Daytime sleepiness    • Eye drainage    • Fatigue    • Heartburn     very occ. diet related   • High cholesterol    • Hoarseness, persistent    • Hypertension    • MRSA (methicillin resistant staph aureus) culture positive 2010    from spider bite- no open wounds as of 2018   • Painful joint    • Palpitations    • Ringing in ears    • Wears glasses      Past Surgical History:   Procedure Laterality Date   • FINGER ARTHROPLASTY Left 7/28/2020    Procedure: ARTHROPLASTY, FINGER - THUMB CARPOMETACARPAL EXCISIONAL;  Surgeon: Edgar Leblanc M.D.;  Location: [facility];  Service: Orthopedics   • TENDON TRANSFER Left 7/28/2020    Procedure: TRANSFER, TENDON - FLEXOR CARPI RADIALIS;  Surgeon: Edgar Leblanc M.D.;  Location: [facility];  Service: Orthopedics   •  CORTISONE INJECTION Bilateral 2020    Procedure: NJECTION RIGHT THUMB AND BILATERAL CARPAL TUNNELS;  Surgeon: Edgar Leblanc M.D.;  Location: SURGERY [facility];  Service: Orthopedics   • JOINT INJECTION DIAGNOSTIC Bilateral 2018    Procedure: JOINT INJECTION DIAGNOSTIC - CMC JOINTS;  Surgeon: Edgar Leblanc M.D.;  Location: SURGERY [facility];  Service: Orthopedics   • TRIGGER FINGER RELEASE Right 2018    Procedure: TRIGGER FINGER RELEASE - THUMB;  Surgeon: Edgar Leblanc M.D.;  Location: SURGERY [facility];  Service: Orthopedics   • CARPAL TUNNEL ENDOSCOPIC  2014    Both side Performed by Edgar Leblanc M.D. at [facility]   • ABDOMINAL HYSTERECTOMY TOTAL      Hysterectomy and removal Ovaries and tubes, Total Abdominal in    • BONE GRAFT      bone graft surgery on left second toe in  due to fracture   • CYSTOSCOPY      Removal of polyps from urinary bladder and dilation of urethra in    • INCISION AND DRAINAGE ENT      I&D of left ear for ear infection twice in  and  by Dr Kahn, ENT   • NASAL SEPTAL RECONSTRUCTION           Social History     Tobacco Use   • Smoking status: Former Smoker     Packs/day: 0.00     Quit date: 1976     Years since quittin.1   • Smokeless tobacco: Never Used   Substance Use Topics   • Alcohol use: Yes     Alcohol/week: 3.6 oz     Types: 6 Glasses of wine per week     Comment: Occasionally      Family and Occupational History     Socioeconomic History   • Marital status:      Spouse name: Not on file   • Number of children: Not on file   • Years of education: Not on file   • Highest education level: Some college, no degree   Occupational History   • Not on file       Objective    Exercises/Treatment  Time-based treatments/modalities:           Assessment/Response/Plan    Functional Assessment Used        Referring provider co-signature:  I have reviewed this plan of care and my  co-signature certifies the need for services.    Certification Period: 03/11/2022 to  {Future Date:11545}    Physician Signature: ________________________________ Date: ______________       [] As the licensed therapist supervising this student, I was present during the entire treatment session directing the care and reviewing the assessment plan.  I reviewed all documentation prior to signing.    (Optional***) The following changes or alternations were made by the licensed therapist.

## 2022-03-12 NOTE — PROGRESS NOTES
"Subjective     Danii Rodriguez is a 61 y.o. female who presents with UTI (X 3-4 days with pain in side and low back and haven't been able to sleep at night from it)            HPI  This is a new problem.  The patient presents to clinic complaining of a possible UTI x3-4 days.  The patient states approximately 3 days ago she developed pain  to her lower abdomen, which is worse on the right side.  The patient describes the pain as cramping.  The patient states she has also been experiencing bilateral flank pain with pain to her lower back.  The patient states her symptoms are often worse at night when laying down.  The patient states her abdominal pain is also worse after eating.  The patient states that she is experiencing nausea after eating.  The patient reports no associated vomiting.  The patient states that she is also experiencing urinary frequency.  The patient reports a history of urinary frequency at night.  The patient states she was told that she has a \"kink\" in her urethra, which can make it difficult to have a strong urine stream.  The patient states over the past several days she has had a weak urine stream.  The patient reports no associated pain with urination.  No hematuria.  The patient also reports no associated fever.  The patient states that she has having regular bowel movements.  She reports no bloody stool.  The patient has taken OTC Azo for her current symptoms.  The patient reports a history of a prior hysterectomy.  She reports no additional abdominal surgeries.      PMH:  has a past medical history of Allergic rhinitis, Anesthesia, Arthritis (07/2020), Cough, Daytime sleepiness, Eye drainage, Fatigue, Heartburn, High cholesterol, Hoarseness, persistent, Hypertension, MRSA (methicillin resistant staph aureus) culture positive (2010), Painful joint, Palpitations, Ringing in ears, and Wears glasses.  MEDS:   Current Outpatient Medications:   •  Cranberry-Vitamin C-Probiotic (AZO CRANBERRY " PO), Take  by mouth., Disp: , Rfl:   •  meloxicam (MOBIC) 15 MG tablet, , Disp: , Rfl:   •  estrogens, conjugated (PREMARIN) 0.625 MG/GM Cream, 0.5g inserted vaginally every 3 days, Disp: 30 g, Rfl: 1  •  sertraline (ZOLOFT) 25 MG tablet, Take 1 Tablet by mouth every day., Disp: 30 Tablet, Rfl: 11  •  Estradiol 0.025 MG/24HR PATCH BIWEEKLY, Place 1 Patch on the skin every 3 days for 30 days., Disp: 8 Patch, Rfl: 1  •  albuterol 108 (90 Base) MCG/ACT Aero Soln inhalation aerosol, albuterol sulfate HFA 90 mcg/actuation aerosol inhaler, Disp: , Rfl:   •  ezetimibe (ZETIA) 10 MG Tab, Take 1 Tablet by mouth every day., Disp: 90 Tablet, Rfl: 0  •  Magnesium 250 MG Tab, Take 250 mg by mouth at bedtime., Disp: , Rfl:   •  lisinopril (PRINIVIL) 20 MG Tab, TAKE 1 TABLET BY MOUTH EVERY DAY, Disp: 90 Tab, Rfl: 4  •  ibuprofen (MOTRIN) 200 MG Tab, Take 600 mg by mouth every day., Disp: , Rfl:   •  fluticasone (FLONASE) 50 MCG/ACT nasal spray, Spray 2 Sprays in nose every morning as needed (ALLERGY)., Disp: , Rfl:   •  Plant Sterols and Stanols (CHOLESTOFF PO), Take 1 Tab by mouth 2 Times a Day., Disp: , Rfl:   •  coenzyme Q-10 30 MG capsule, Take 30 mg by mouth every morning., Disp: , Rfl:   •  Multiple Vitamins-Minerals (WOMENS 50+ MULTI VITAMIN/MIN PO), Take 1 Tab by mouth every morning., Disp: , Rfl:   •  Cholecalciferol (VITAMIN D3) 2000 UNIT Cap, Take 4,000 Units by mouth every day., Disp: , Rfl:   •  NON SPECIFIED, Inject 1 Each as directed Q30 DAYS. SEASONAL SHOT, Disp: , Rfl:   ALLERGIES:   Allergies   Allergen Reactions   • Ciprofloxacin Hives and Nausea     Okay to take cipro ear drops, no reaction with ear drop   • Hydrocodone Rash     RASH     • Sulfa Drugs Rash and Itching     Not had but was told not to use   • Latex Rash and Itching     Tape burns and rash- paper tape OK   • Other Environmental Itching     seasonal   • Hydrocodone-Acetaminophen    • Statins [Hmg-Coa-R Inhibitors] Unspecified     Hypertensive  Crisis     SURGHX:   Past Surgical History:   Procedure Laterality Date   • FINGER ARTHROPLASTY Left 7/28/2020    Procedure: ARTHROPLASTY, FINGER - THUMB CARPOMETACARPAL EXCISIONAL;  Surgeon: Edgar Leblanc M.D.;  Location: [facility];  Service: Orthopedics   • TENDON TRANSFER Left 7/28/2020    Procedure: TRANSFER, TENDON - FLEXOR CARPI RADIALIS;  Surgeon: Edgar Leblanc M.D.;  Location: [facility];  Service: Orthopedics   • CORTISONE INJECTION Bilateral 7/28/2020    Procedure: NJECTION RIGHT THUMB AND BILATERAL CARPAL TUNNELS;  Surgeon: Edgar Leblanc M.D.;  Location: [facility];  Service: Orthopedics   • JOINT INJECTION DIAGNOSTIC Bilateral 11/20/2018    Procedure: JOINT INJECTION DIAGNOSTIC - CMC JOINTS;  Surgeon: Edgar Leblanc M.D.;  Location: [facility];  Service: Orthopedics   • TRIGGER FINGER RELEASE Right 11/20/2018    Procedure: TRIGGER FINGER RELEASE - THUMB;  Surgeon: Edgar Leblanc M.D.;  Location: [facility];  Service: Orthopedics   • CARPAL TUNNEL ENDOSCOPIC  9/30/2014    Both side Performed by Edgar Leblanc M.D. at [facility]   • ABDOMINAL HYSTERECTOMY TOTAL      Hysterectomy and removal Ovaries and tubes, Total Abdominal in 2000   • BONE GRAFT      bone graft surgery on left second toe in 2002 due to fracture   • CYSTOSCOPY      Removal of polyps from urinary bladder and dilation of urethra in 2000   • INCISION AND DRAINAGE ENT      I&D of left ear for ear infection twice in 2010 and 2011 by Dr Kahn, ENT   • NASAL SEPTAL RECONSTRUCTION      2005     SOCHX:  reports that she quit smoking about 46 years ago. She smoked 0.00 packs per day. She has never used smokeless tobacco. She reports current alcohol use of about 3.6 oz of alcohol per week. She reports that she does not use drugs.  FH: Family history was reviewed, no pertinent findings to report      Review of Systems   Constitutional: Negative for fever.  "  HENT: Negative for congestion, ear pain and sore throat.    Eyes: Negative for discharge and redness.   Respiratory: Negative for cough and shortness of breath.    Cardiovascular: Negative for chest pain and leg swelling.   Gastrointestinal: Positive for abdominal pain and nausea. Negative for blood in stool, constipation, diarrhea and vomiting.   Genitourinary: Positive for flank pain, frequency and urgency. Negative for dysuria.   Musculoskeletal: Negative for joint pain.   Skin: Negative for rash.   Neurological: Positive for headaches.              Objective     /70 (BP Location: Right arm, Patient Position: Sitting, BP Cuff Size: Adult)   Pulse 68   Temp 36.4 °C (97.6 °F) (Temporal)   Resp 16   Ht 1.6 m (5' 3\")   Wt 64.4 kg (142 lb)   SpO2 98%   BMI 25.15 kg/m²      Physical Exam  Constitutional:       General: She is not in acute distress.     Appearance: Normal appearance. She is well-developed. She is not ill-appearing.   HENT:      Head: Normocephalic and atraumatic.      Right Ear: External ear normal.      Left Ear: External ear normal.   Eyes:      Extraocular Movements: Extraocular movements intact.      Conjunctiva/sclera: Conjunctivae normal.   Cardiovascular:      Rate and Rhythm: Normal rate and regular rhythm.      Heart sounds: Normal heart sounds.   Pulmonary:      Effort: Pulmonary effort is normal. No respiratory distress.      Breath sounds: Normal breath sounds. No wheezing.   Abdominal:      General: Bowel sounds are normal.      Palpations: Abdomen is soft.      Tenderness: There is abdominal tenderness in the right lower quadrant. There is guarding and rebound. There is no right CVA tenderness or left CVA tenderness.   Musculoskeletal:         General: Normal range of motion.      Cervical back: Normal range of motion and neck supple.   Skin:     General: Skin is warm and dry.   Neurological:      Mental Status: She is alert and oriented to person, place, and time.          "      Progress:   Results for orders placed or performed in visit on 03/11/22   POCT Urinalysis   Result Value Ref Range    POC Color Light Yellow Negative    POC Appearance Clear Negative    POC Leukocyte Esterase Negative Negative    POC Nitrites Negative Negative    POC Urobiligen 0.2 Negative (0.2) mg/dL    POC Protein Negative Negative mg/dL    POC Urine PH 6.0 5.0 - 8.0    POC Blood Negative Negative    POC Specific Gravity 1.005 <1.005 - >1.030    POC Ketones Negative Negative mg/dL    POC Bilirubin Negative Negative mg/dL    POC Glucose Negative Negative mg/dL                 Assessment & Plan          1. Lower abdominal pain    2. Urinary frequency  - POCT Urinalysis    The patient's presenting symptoms and physical exam endings are consistent with lower abdominal pain.  The patient is also experiencing urinary frequency.  The patient initially thought her symptoms were related to a possible UTI.  The patient states she has been experiencing lower abdominal pain over the past several days, which is worse on the right side.  The patient is also been experiencing bilateral flank pain and pain to her lower back.  On physical exam, the patient had localized abdominal tenderness to the right lower quadrant with associated guarding and rebound.  The patient's bowel sounds are within normal limits.  No CVA tenderness was appreciated.  The remainder the patient's physical exam today in clinic was normal.  The patient appears in no acute distress.  The patient's vital signs are stable and within normal limits.  She is afebrile today in clinic.  The patient's POCT urinalysis today in clinic showed no signs of infection with negative leukocyte esterase, negative blood, negative nitrites.  The cause of the patient's current symptoms is unknown at this time.  I have low clinical suspicion that the patient's symptoms are related to an acute urinary tract infection.  Based on the patient's presenting symptoms and physical  exam findings, I am concerned about a possible acute abdominal process, such as appendicitis.  Unfortunately, an outpatient work-up could not be obtained in a timely manner.  Therefore, I recommend the patient be transferred to the [facility] ED for further evaluation management.  The patient agrees with this plan.  The patient stable for transfer via private vehicle at this time.  Advised the patient of the social risk of not seeking further care for her current symptoms, and she verbalized understanding.     Plan:  Transfer patient to the [facility] ED for further evaluation and management.    Please note that this dictation was created using voice recognition software. I have made every reasonable attempt to correct obvious errors, but I expect that there may be errors of grammar and possibly content that I did not discover before finalizing the note.     This note was electronically signed by Margot Mcpherson PA-C

## 2022-03-15 ENCOUNTER — APPOINTMENT (OUTPATIENT)
Dept: PHYSICAL THERAPY | Facility: REHABILITATION | Age: 62
End: 2022-03-15
Payer: COMMERCIAL

## 2022-03-16 ENCOUNTER — HOSPITAL ENCOUNTER (OUTPATIENT)
Dept: LAB | Facility: MEDICAL CENTER | Age: 62
End: 2022-03-16
Payer: COMMERCIAL

## 2022-03-16 ENCOUNTER — OFFICE VISIT (OUTPATIENT)
Dept: MEDICAL GROUP | Facility: PHYSICIAN GROUP | Age: 62
End: 2022-03-16
Payer: COMMERCIAL

## 2022-03-16 VITALS
HEIGHT: 63 IN | DIASTOLIC BLOOD PRESSURE: 70 MMHG | HEART RATE: 67 BPM | OXYGEN SATURATION: 98 % | BODY MASS INDEX: 25.16 KG/M2 | RESPIRATION RATE: 18 BRPM | TEMPERATURE: 98.4 F | WEIGHT: 142 LBS | SYSTOLIC BLOOD PRESSURE: 110 MMHG

## 2022-03-16 DIAGNOSIS — R10.9 BILATERAL FLANK PAIN: ICD-10-CM

## 2022-03-16 DIAGNOSIS — R19.5 CLAY-COLORED STOOLS: ICD-10-CM

## 2022-03-16 DIAGNOSIS — R10.9 FLANK PAIN: ICD-10-CM

## 2022-03-16 DIAGNOSIS — R10.11 RIGHT UPPER QUADRANT PAIN: ICD-10-CM

## 2022-03-16 LAB
ALBUMIN SERPL BCP-MCNC: 4.9 G/DL (ref 3.2–4.9)
ALBUMIN/GLOB SERPL: 2.7 G/DL
ALP SERPL-CCNC: 55 U/L (ref 30–99)
ALT SERPL-CCNC: 27 U/L (ref 2–50)
ANION GAP SERPL CALC-SCNC: 11 MMOL/L (ref 7–16)
AST SERPL-CCNC: 22 U/L (ref 12–45)
BILIRUB SERPL-MCNC: 0.2 MG/DL (ref 0.1–1.5)
BUN SERPL-MCNC: 14 MG/DL (ref 8–22)
CALCIUM SERPL-MCNC: 9.8 MG/DL (ref 8.5–10.5)
CHLORIDE SERPL-SCNC: 102 MMOL/L (ref 96–112)
CO2 SERPL-SCNC: 25 MMOL/L (ref 20–33)
CREAT SERPL-MCNC: 0.76 MG/DL (ref 0.5–1.4)
ERYTHROCYTE [DISTWIDTH] IN BLOOD BY AUTOMATED COUNT: 48.7 FL (ref 35.9–50)
GFR SERPLBLD CREATININE-BSD FMLA CKD-EPI: 88 ML/MIN/1.73 M 2
GLOBULIN SER CALC-MCNC: 1.8 G/DL (ref 1.9–3.5)
GLUCOSE SERPL-MCNC: 82 MG/DL (ref 65–99)
HCT VFR BLD AUTO: 40 % (ref 37–47)
HGB BLD-MCNC: 12.9 G/DL (ref 12–16)
MCH RBC QN AUTO: 30.4 PG (ref 27–33)
MCHC RBC AUTO-ENTMCNC: 32.3 G/DL (ref 33.6–35)
MCV RBC AUTO: 94.1 FL (ref 81.4–97.8)
PLATELET # BLD AUTO: 280 K/UL (ref 164–446)
PMV BLD AUTO: 10.9 FL (ref 9–12.9)
POTASSIUM SERPL-SCNC: 4.8 MMOL/L (ref 3.6–5.5)
PROT SERPL-MCNC: 6.7 G/DL (ref 6–8.2)
RBC # BLD AUTO: 4.25 M/UL (ref 4.2–5.4)
SODIUM SERPL-SCNC: 138 MMOL/L (ref 135–145)
WBC # BLD AUTO: 6.4 K/UL (ref 4.8–10.8)

## 2022-03-16 PROCEDURE — 36415 COLL VENOUS BLD VENIPUNCTURE: CPT

## 2022-03-16 PROCEDURE — 80053 COMPREHEN METABOLIC PANEL: CPT

## 2022-03-16 PROCEDURE — 99214 OFFICE O/P EST MOD 30 MIN: CPT

## 2022-03-16 PROCEDURE — 85027 COMPLETE CBC AUTOMATED: CPT

## 2022-03-16 ASSESSMENT — FIBROSIS 4 INDEX: FIB4 SCORE: 1.09

## 2022-03-16 NOTE — OP THERAPY EVALUATION
Outpatient Physical Therapy  INITIAL EVALUATION    [facility] Therapy [address]  [address]  [place] [address]  Phone:  [phone]  Fax:  [phone]    Date of Evaluation: 03/17/2022    Patient: Danii Rodriguez  YOB: 1960  MRN: [id number]     Referring Provider: PAN Abdul  [address] [place] [address]   Referring Diagnosis Low back pain, unspecified [M54.50];Other chronic pain [G89.29]     Time Calculation                 Chief Complaint: No chief complaint on file.    {No diagnosis found. (Refresh or delete this SmartLink)}    Date of onset of impairment: No data found    Subjective:   History of Present Illness:     Mechanism of injury:  What brings you in:     Aggravating/alleviating:     Characteristics:     Changes in bowel or bladder:           Past Medical History:   Diagnosis Date   • Allergic rhinitis    • Anesthesia     ponv   • Arthritis 07/2020    osteo- hands, knees, hips   • Cough    • Daytime sleepiness    • Eye drainage    • Fatigue    • Heartburn     very occ. diet related   • High cholesterol    • Hoarseness, persistent    • Hypertension    • MRSA (methicillin resistant staph aureus) culture positive 2010    from spider bite- no open wounds as of 2018   • Painful joint    • Palpitations    • Ringing in ears    • Wears glasses      Past Surgical History:   Procedure Laterality Date   • FINGER ARTHROPLASTY Left 7/28/2020    Procedure: ARTHROPLASTY, FINGER - THUMB CARPOMETACARPAL EXCISIONAL;  Surgeon: Edgar Leblanc M.D.;  Location: [facility];  Service: Orthopedics   • TENDON TRANSFER Left 7/28/2020    Procedure: TRANSFER, TENDON - FLEXOR CARPI RADIALIS;  Surgeon: Edgar Leblanc M.D.;  Location: [facility];  Service: Orthopedics   • CORTISONE INJECTION Bilateral 7/28/2020    Procedure: NJECTION RIGHT THUMB AND BILATERAL CARPAL TUNNELS;  Surgeon: Edgar Leblanc M.D.;  Location: [facility];  Service: Orthopedics   • JOINT INJECTION DIAGNOSTIC Bilateral 2018    Procedure: JOINT INJECTION DIAGNOSTIC - CMC JOINTS;  Surgeon: Edgar Leblanc M.D.;  Location: SURGERY [facility];  Service: Orthopedics   • TRIGGER FINGER RELEASE Right 2018    Procedure: TRIGGER FINGER RELEASE - THUMB;  Surgeon: Edgar Leblanc M.D.;  Location: SURGERY [facility];  Service: Orthopedics   • CARPAL TUNNEL ENDOSCOPIC  2014    Both side Performed by Edgar Leblanc M.D. at [facility]   • ABDOMINAL HYSTERECTOMY TOTAL      Hysterectomy and removal Ovaries and tubes, Total Abdominal in    • BONE GRAFT      bone graft surgery on left second toe in  due to fracture   • CYSTOSCOPY      Removal of polyps from urinary bladder and dilation of urethra in    • INCISION AND DRAINAGE ENT      I&D of left ear for ear infection twice in  and  by Dr Kahn, ENT   • NASAL SEPTAL RECONSTRUCTION           Social History     Tobacco Use   • Smoking status: Former Smoker     Packs/day: 0.00     Quit date: 1976     Years since quittin.1   • Smokeless tobacco: Never Used   Substance Use Topics   • Alcohol use: Yes     Alcohol/week: 3.6 oz     Types: 6 Glasses of wine per week     Comment: Occasionally      Family and Occupational History     Socioeconomic History   • Marital status:      Spouse name: Not on file   • Number of children: Not on file   • Years of education: Not on file   • Highest education level: Some college, no degree   Occupational History   • Not on file       Objective    Exercises/Treatment  Time-based treatments/modalities:           Assessment/Response/Plan    Functional Assessment Used        Referring provider co-signature:  I have reviewed this plan of care and my co-signature certifies the need for services.    Certification Period: 2022 to  {Future Date:70751}    Physician Signature: ________________________________  Date: ______________       [] As the licensed therapist supervising this student, I was present during the entire treatment session directing the care and reviewing the assessment plan.  I reviewed all documentation prior to signing.    (Optional***) The following changes or alternations were made by the licensed therapist.

## 2022-03-16 NOTE — PROGRESS NOTES
"CC:   Chief Complaint   Patient presents with   • Abdominal Pain     UC said no UTI RLQ and side and BL flank          HISTORY OF PRESENT ILLNESS: Patient is a 61 y.o. female established patient who presents today to discuss the following problems below:     Flank pain  Patient was seen in urgent care 5 days ago for concerns of UTI secondary to right lower quadrant, right side and bilateral flank pain.  At that time she was advised to go to ER to rule out renal stones versus pyelonephritis as her urine sample was negative.  She did not present to ER secondary to financial reasons.  She reports that the pain has still been persistent and getting somewhat worse.  She denies fevers and nausea.  She notes that she has had some increase in urination but no burning.  Additionally, she reports that she has had some lauryn colored, pale stools but thinks this could be due to her diet      Past Medical History:   Diagnosis Date   • Allergic rhinitis    • Anesthesia     ponv   • Arthritis 07/2020    osteo- hands, knees, hips   • Cough    • Daytime sleepiness    • Eye drainage    • Fatigue    • Heartburn     very occ. diet related   • High cholesterol    • Hoarseness, persistent    • Hypertension    • MRSA (methicillin resistant staph aureus) culture positive 2010    from spider bite- no open wounds as of 2018   • Painful joint    • Palpitations    • Ringing in ears    • Wears glasses        Allergies:Ciprofloxacin, Hydrocodone, Sulfa drugs, Latex, Other environmental, Hydrocodone-acetaminophen, and Statins [hmg-coa-r inhibitors]    Review of Systems: Otherwise negative except for as stated above.      Exam: /70 (BP Location: Left arm, Patient Position: Sitting, BP Cuff Size: Adult)   Pulse 67   Temp 36.9 °C (98.4 °F) (Temporal)   Resp 18   Ht 1.6 m (5' 3\")   Wt 64.4 kg (142 lb)   SpO2 98%  Body mass index is 25.15 kg/m².    Gen: Alert and oriented x4. Well developed, well-nourished female in no apparent " distress.  Skin: Warm, dry, good turgor, no rashes in visible areas or lacerations appreciated.   Eye: EOM intact, pupils equal, round and reactive, conjunctiva clear, lids normal.  Neck: Trachea midline, no masses, no thyromegaly  Lungs: Normal effort, CTA bilaterally, no wheezes, rhonchi, or rales. No stridor or audible wheezing. Equal chest expansion.   CV: Regular rate and rhythm. No murmurs, rubs, or gallops.  GI:  Soft, non-tender abdomen with no distention.  Right CVA tenderness.  MSK: Normal gait, moves all extremities.  Neuro: Alert and oriented x 4, non-focal exam with motor and sensory grossly intact.  Ext: No clubbing, cyanosis, edema.  Psych: Normal behavior, affect and mood.      Assessment/Plan:  61 y.o. female with the following -    1. Edy-colored stools  New onset, new condition with potential for concerning outcome.  Rule out underlying biliary dysfunction.  Obtain ultrasound of right upper quadrant as well as CMP for liver enzymes.  - US-RUQ; Future  - Comp Metabolic Panel; Future    2. Bilateral flank pain  Acute condition, progressively worsening.  CMP and CBC as well as CT renal scan for further evaluation.  Concern for pyelonephritis versus renal stones, which patient has had a history of  - Comp Metabolic Panel; Future  - CBC WITHOUT DIFFERENTIAL; Future    3. Right upper quadrant pain  Acute condition, progressively worsening.  CMP and CBC as well as CT renal scan for further evaluation.  Concern for pyelonephritis versus renal stones, which patient has had a history of  - CT-RENAL WITH & W/O; Future    Follow-up: Return in about 2 weeks (around 3/30/2022) for Review results.    Health Maintenance: Completed      Please note that this dictation was created using voice recognition software. I have made every reasonable attempt to correct obvious errors, but I expect that there are errors of grammar and possibly content that I did not discover before finalizing the note.    Electronically  signed by JADIEL Woo-C on March 16, 2022

## 2022-03-17 ENCOUNTER — APPOINTMENT (OUTPATIENT)
Dept: PHYSICAL THERAPY | Facility: REHABILITATION | Age: 62
End: 2022-03-17
Payer: COMMERCIAL

## 2022-03-17 PROBLEM — R10.9 FLANK PAIN: Status: ACTIVE | Noted: 2022-03-17

## 2022-03-17 NOTE — ASSESSMENT & PLAN NOTE
Patient was seen in urgent care 5 days ago for concerns of UTI secondary to right lower quadrant, right side and bilateral flank pain.  At that time she was advised to go to ER to rule out renal stones versus pyelonephritis as her urine sample was negative.  She did not present to ER secondary to financial reasons.  She reports that the pain has still been persistent and getting somewhat worse.  She denies fevers and nausea.  She notes that she has had some increase in urination but no burning.  Additionally, she reports that she has had some lauryn colored, pale stools but thinks this could be due to her diet

## 2022-03-21 NOTE — OP THERAPY EVALUATION
"  Outpatient Physical Therapy  INITIAL EVALUATION    [facility] Physical Therapy [address].  [address]  [place] [address]  Phone:  [phone]  Fax:  [phone]    Date of Evaluation: 03/24/2022    Patient: Danii Rodriguez  YOB: 1960  MRN: [id number]     Referring Provider: PAN Abdul[address] [place] [address]   Referring Diagnosis Low back pain, unspecified [M54.50];Other chronic pain [G89.29]     Time Calculation                 Chief Complaint: No chief complaint on file.    Visit Diagnoses     ICD-10-CM   1. Chronic midline low back pain without sciatica  M54.50    G89.29   2. Chronic midline thoracic back pain  M54.6    G89.29       Date of onset of impairment: No data found    Subjective:   History of Present Illness:     Date of onset:  3/25/2020    Mechanism of injury:  Patient present to day with complaints of low back pain lasting the last 2 years. She is currently having pain in her mid/low back and hips that is keeping her form performing her hobbies, ADLs, and care giving tasks for her mother. She is currently going through some \"kidney and bladder issues\" for which she is seeing her doctor. She was taking Maloxacane for a month (for foot/ankle pain) which significantly decreased her back and hip pain, and she was able to perform all activities without pain.     She does have a current diagnosis of arthritis that was confirmed by an x-ray. She describes her pain as radiating form her back making it painful to breath, achyness, and sometimes a burning or pulsating pain. Along with radiating pain into her right anterior thigh/groin.     She is having difficulty sleeping at night due to an increase in frequency of urination starting at 10pm going until 2am in approximately 15 minute intervals. She states she actually goes every time she gets up and is straining more to \"get it out\". This has been going on since May of 2021. She had " recently noticed a change in stool color and has been following up with a physician regarding that. It has been discussed for possible follow up with a urologist with her physician.     Her pain is made worse with standing and bending over to do the dishes, doing house and yard work, walking more than 30 minutes, normal home activities, folding laundry and caring for her mother. Her pain does cause difficulties with sleeping, however she is unable to determine if the pain starts with her bladder or her hips/back.    No numbness or tingling in her legs. She does get leg cramps (taking magnesium that has been helping).      She is able to alleviate her symptoms with a hot bath at the end of the day, laying on the ground and taking medications (she does not like taking medications).        Quality of life:  Good  Pain:     Current pain rating:  3    At best pain ratin    At worst pain rating:  10    Location:  Back more during the day and hips in the evening (sleeps on her back)   Patient Goals:     Patient goals for therapy:  Decreased pain and independence with ADLs/IADLs      Past Medical History:   Diagnosis Date   • Allergic rhinitis    • Anesthesia     ponv   • Arthritis 2020    osteo- hands, knees, hips   • Cough    • Daytime sleepiness    • Eye drainage    • Fatigue    • Heartburn     very occ. diet related   • High cholesterol    • Hoarseness, persistent    • Hypertension    • MRSA (methicillin resistant staph aureus) culture positive     from spider bite- no open wounds as of 2018   • Painful joint    • Palpitations    • Ringing in ears    • Wears glasses      Past Surgical History:   Procedure Laterality Date   • FINGER ARTHROPLASTY Left 2020    Procedure: ARTHROPLASTY, FINGER - THUMB CARPOMETACARPAL EXCISIONAL;  Surgeon: Edgar Leblanc M.D.;  Location: SURGERY [facility];  Service: Orthopedics   • TENDON TRANSFER Left 2020    Procedure: TRANSFER, TENDON - FLEXOR CARPI RADIALIS;   Surgeon: Edgar Leblanc M.D.;  Location: [facility];  Service: Orthopedics   • CORTISONE INJECTION Bilateral 2020    Procedure: NJECTION RIGHT THUMB AND BILATERAL CARPAL TUNNELS;  Surgeon: Edgar Leblanc M.D.;  Location: [facility];  Service: Orthopedics   • JOINT INJECTION DIAGNOSTIC Bilateral 2018    Procedure: JOINT INJECTION DIAGNOSTIC - [facility] JOINTS;  Surgeon: Edgar Leblanc M.D.;  Location: [facility];  Service: Orthopedics   • TRIGGER FINGER RELEASE Right 2018    Procedure: TRIGGER FINGER RELEASE - THUMB;  Surgeon: Edgar Leblanc M.D.;  Location: [facility];  Service: Orthopedics   • CARPAL TUNNEL ENDOSCOPIC  2014    Both side Performed by Edgar Leblanc M.D. at [facility]   • ABDOMINAL HYSTERECTOMY TOTAL      Hysterectomy and removal Ovaries and tubes, Total Abdominal in    • BONE GRAFT      bone graft surgery on left second toe in  due to fracture   • CYSTOSCOPY      Removal of polyps from urinary bladder and dilation of urethra in    • INCISION AND DRAINAGE ENT      I&D of left ear for ear infection twice in  and  by Dr Kahn, ENT   • NASAL SEPTAL RECONSTRUCTION           Social History     Tobacco Use   • Smoking status: Former Smoker     Packs/day: 0.00     Quit date: 1976     Years since quittin.2   • Smokeless tobacco: Never Used   Substance Use Topics   • Alcohol use: Not Currently     Alcohol/week: 3.6 oz     Types: 6 Glasses of wine per week     Comment: Occasionally      Family and Occupational History     Socioeconomic History   • Marital status:      Spouse name: Not on file   • Number of children: Not on file   • Years of education: Not on file   • Highest education level: Some college, no degree   Occupational History   • Not on file       Objective     Postural Observations  Seated posture: good  Standing posture: good    Additional Postural Observation  Details  She demonstrated good seated and standing posture without showing pain behaviors in either positions.     Neurological Testing     Reflexes   Left   Patellar (L4): normal (2+)  Achilles (S1): normal (2+)    Right   Patellar (L4): brisk (3+)  Achilles (S1): brisk (3+)    Myotome testing   Lumbar (left)   L1 (hip flexors): 5  L2 (hip flexors): 5  L3 (knee extensors): 5  L4 (ankle dorsiflexors): 5    Lumbar (right)   L1 (hip flexors): 4-  L2 (hip flexors): 4-  L3 (knee extensors): 5  L4 (ankle dorsiflexors): 5    Active Range of Motion     Lumbar   Flexion: within functional limits  Extension: within functional limits  Left lateral flexion: within functional limits  Right lateral flexion: decreased  Left rotation: decreased  Right rotation: within functional limits    Additional Active Range of Motion Details  Lumbar flexion showed decreased rounding of the lumbar spine (she had increased use of hip hinge during flexion- assess next visit with cues to flex rolling down each spinal segment)     General Comments     Spine Comments   Due to time constraints further assessment will be done to rule in/out musculoskeletal or systemic cause of back pain.   - Screen the hip  - Assess joint mobility of the lumbar spine and hip joints  - Reassess lumbar flexion (cues for rounding low back not just flexing at the hips)   - Assess bilateral slump and SLR testing   - Assess tenderness and palpation of the back   - Assess strength using sustained bridging        Therapeutic Exercises (CPT 40436):       Therapeutic Exercise Summary: Education on bladder diary. Input, output and frequency.           Time-based treatments/modalities:           Assessment, Response and Plan:   Impairments: abnormal or restricted ROM, impaired physical strength and pain with function    Assessment details:  Patient is a 61 year old female who presents to physical therapy with a chief complaint of back pain and bilateral hip/pelvis pain. Due to  subjective reports and current objective findings support there is likely there is a musculoskeletal component to her low back but will continue to further assess as there may be referral pain from her kidneys/bladder to her back. Due to limited time further assessment of the lumbar spine, neural mobility, and hips will be completed at next scheduled visit. At this time she will continue to benefit from skilled physical therapy to decrease back pain, improve functional strength and endurance for performing ADLs and care giving tasks for her mother.   Barriers to therapy:  Comorbidities  Prognosis: good    Goals:   Short Term Goals:   1. Patient will be compliant with HEP 3-5 days a week to improve strength, endurance and stability of her back to increase function during performance of ADLs   2. Patient will complete sustained bridge hold for assessment of hip/lumbar strength  3. Patient will complete neurodynamic testing to rule in/out neural component of back/hip pain.     Short term goal time span:  2-4 weeks      Long Term Goals:    1. Patient will be able to walk for 30 minutes with pain at a 2-3/10 for increased ability to perform hobbies and community ambulation.   2. Patient will be able to stand for 10 minutes with 3-4/10 pain so she is able to do the dishes and folding laundry.   3. Patient will be able to perform a floor to waist lift for improvement of lifting technique and ability to bring in the groceries.   4. Patient will be able to perform a sustained bridge hold for 30 seconds from time of assessment to show improvements in lower extremity strength.   Long term goal time span:  6-8 weeks    Plan:   Therapy options:  Physical therapy treatment to continue  Planned therapy interventions:  Manual Therapy (CPT 33568), Therapeutic Activities (CPT 57380), Therapeutic Exercise (CPT 91063) and Neuromuscular Re-education (CPT 37552)  Frequency:  2x week  Duration in weeks:  8  Discussed with:   Patient      Functional Assessment Used  Facundo Ulysses Low Back Pain and Disability Score: 41.67     Referring provider co-signature:  I have reviewed this plan of care and my co-signature certifies the need for services.    Certification Period: 03/24/2022 to  05/20/22    Physician Signature: ________________________________ Date: ______________       [x] As the licensed therapist supervising this student, I was present during the entire treatment session directing the care and reviewing the assessment plan.  I reviewed all documentation prior to signing. The following changes or alternations were made by the licensed therapist.

## 2022-03-22 ENCOUNTER — APPOINTMENT (OUTPATIENT)
Dept: PHYSICAL THERAPY | Facility: REHABILITATION | Age: 62
End: 2022-03-22
Payer: COMMERCIAL

## 2022-03-22 DIAGNOSIS — R10.84 GENERALIZED ABDOMINAL PAIN: ICD-10-CM

## 2022-03-22 DIAGNOSIS — R10.9 ABDOMINAL PAIN, UNSPECIFIED ABDOMINAL LOCATION: ICD-10-CM

## 2022-03-24 ENCOUNTER — PHYSICAL THERAPY (OUTPATIENT)
Dept: PHYSICAL THERAPY | Facility: REHABILITATION | Age: 62
End: 2022-03-24
Payer: COMMERCIAL

## 2022-03-24 DIAGNOSIS — G89.29 CHRONIC MIDLINE LOW BACK PAIN WITHOUT SCIATICA: ICD-10-CM

## 2022-03-24 DIAGNOSIS — G89.29 CHRONIC MIDLINE THORACIC BACK PAIN: ICD-10-CM

## 2022-03-24 DIAGNOSIS — M54.50 CHRONIC MIDLINE LOW BACK PAIN WITHOUT SCIATICA: ICD-10-CM

## 2022-03-24 DIAGNOSIS — M54.6 CHRONIC MIDLINE THORACIC BACK PAIN: ICD-10-CM

## 2022-03-24 PROCEDURE — 97162 PT EVAL MOD COMPLEX 30 MIN: CPT

## 2022-03-24 ASSESSMENT — ENCOUNTER SYMPTOMS
PAIN SCALE: 3
PAIN SCALE AT HIGHEST: 10
PAIN SCALE AT LOWEST: 0

## 2022-03-25 SDOH — ECONOMIC STABILITY: GENERAL: QUALITY OF LIFE: GOOD

## 2022-03-29 ENCOUNTER — PHYSICAL THERAPY (OUTPATIENT)
Dept: PHYSICAL THERAPY | Facility: REHABILITATION | Age: 62
End: 2022-03-29
Payer: COMMERCIAL

## 2022-03-29 DIAGNOSIS — G89.29 CHRONIC MIDLINE LOW BACK PAIN WITHOUT SCIATICA: ICD-10-CM

## 2022-03-29 DIAGNOSIS — M54.6 CHRONIC MIDLINE THORACIC BACK PAIN: ICD-10-CM

## 2022-03-29 DIAGNOSIS — M54.50 CHRONIC MIDLINE LOW BACK PAIN WITHOUT SCIATICA: ICD-10-CM

## 2022-03-29 DIAGNOSIS — G89.29 CHRONIC MIDLINE THORACIC BACK PAIN: ICD-10-CM

## 2022-03-29 PROCEDURE — 97110 THERAPEUTIC EXERCISES: CPT

## 2022-03-29 NOTE — OP THERAPY DAILY TREATMENT
Outpatient Physical Therapy  DAILY TREATMENT     [facility] Physical [address].  [address]  [place] [address]  Phone:  [phone]  Fax:  [phone]    Date: 03/29/2022    Patient: Danii Rodriguez  YOB: 1960  MRN: [id number]     Time Calculation    Start time: 1400  Stop time: 1442 Time Calculation (min): 42 minutes         Chief Complaint: Back Problem and Weakness    Visit #: 2    SUBJECTIVE:  Starting hurting after leaving last session. Is pretty sore after doing a lot of yard work. Back is hurting on both sides difficult to sleep. Keeping bladder diary and has noticed more output than intake. She has stopped drinking, besides sips, more than 3 hours before going to bed and frequency/amount has not changed.     OBJECTIVE:  Current objective measures:   Sustained bridge hold: 55 seconds- reports of increase in symptoms   Louis test: positive bilaterally   Lumbar ROM: Decreased lumbar flexion with pain with cues for segmental flexion   Lumbar joint mobility: 2/6 non-painful  Thoracic joint mobility 2/6 non-painful   Thoracic spine rotation in quadruped: limited to the right by 10 degrees   Hip FABIR/FADER: negative bilaterally   Left hamstring supine length test limited   SLR/Slump: Negative bilaterally   Kidney thumb test: bilaterally negative           Therapeutic Exercises (CPT 83887):     1. Bridge holds , 3 X 45 seconds     2. Lower trunk rotations , 2 X 10     3. Kneeling Louis stretch , 1 X 1 minute bilaterally       Therapeutic Exercise Summary: 3/29: HEP bridge holds (45seconds), Kneeling Louis stretch (1 minute) and LTR X 2 minutes       Time-based treatments/modalities:    Physical Therapy Timed Treatment Charges  Therapeutic exercise minutes (CPT 61414): 42 minutes          ASSESSMENT:   Response to treatment: Further assessment of patients mid/low back pain was completed. It is still likely there is a musculoskeletal component to her low back due to  symptom reproduction during segmental lumbar flexion and bridge holding. As well as a muscle length component to her symptoms since the Louis test stretch helped to relieve the increase in symptoms she had with other tests. Due to bladder/ night time urination pelvic health PT was discussed as a possibility to look into if her scans/tests for her bladder and kidney's come back negative. Continued assessment throughout treatment will continue as there may be systemic factors influencing her mid/low back pain as well. She will continue to benefit from skilled physical therapy for improvement with lumbar joint mobility, overall strength, muscle endurance and stability of the spine.     PLAN/RECOMMENDATIONS:   Plan for treatment: therapy treatment to continue next visit.  Planned interventions for next visit: continue with current treatment.      [x] As the licensed therapist supervising this student, I was present during the entire treatment session directing the care and reviewing the assessment plan.  I reviewed all documentation prior to signing. The following changes or alternations were made by the licensed therapist.

## 2022-03-31 ENCOUNTER — PHYSICAL THERAPY (OUTPATIENT)
Dept: PHYSICAL THERAPY | Facility: REHABILITATION | Age: 62
End: 2022-03-31
Payer: COMMERCIAL

## 2022-03-31 DIAGNOSIS — M54.50 CHRONIC MIDLINE LOW BACK PAIN WITHOUT SCIATICA: ICD-10-CM

## 2022-03-31 DIAGNOSIS — M54.6 CHRONIC MIDLINE THORACIC BACK PAIN: ICD-10-CM

## 2022-03-31 DIAGNOSIS — G89.29 CHRONIC MIDLINE LOW BACK PAIN WITHOUT SCIATICA: ICD-10-CM

## 2022-03-31 DIAGNOSIS — G89.29 CHRONIC MIDLINE THORACIC BACK PAIN: ICD-10-CM

## 2022-03-31 PROCEDURE — 97110 THERAPEUTIC EXERCISES: CPT

## 2022-03-31 NOTE — OP THERAPY DAILY TREATMENT
"  Outpatient Physical Therapy  DAILY TREATMENT     [facility] [address].  [address]  [place] [address]  Phone:  [phone]  Fax:  [phone]    Date: 03/31/2022    Patient: Danii Rodriguez  YOB: 1960  MRN: [id number]     Time Calculation    Start time: 1400  Stop time: 1443 Time Calculation (min): 43 minutes         Chief Complaint: Back Problem    Visit #: 3    SUBJECTIVE:  She was feeling sore from the exercises muscularly. She did feel better after doing her exercises but later in the evening when she lays down her back \"seizes up\". At her mid back around her bra line increased with pain while she was doing housework and she has to rest.     OBJECTIVE:  Current objective measures:   Sustained bridge hold: 55 seconds- reports of increase in symptoms   Louis test: positive bilaterally   Lumbar ROM: Decreased lumbar flexion with pain with cues for segmental flexion   Lumbar joint mobility: 2/6 non-painful  Thoracic joint mobility 2/6 non-painful   Thoracic spine rotation in quadruped: limited to the right by 10 degrees   Hip FABIR/FADER: negative bilaterally   Left hamstring supine length test limited   SLR/Slump: Negative bilaterally   Kidney thumb test: bilaterally negative           Therapeutic Exercises (CPT 75497):     1. Bridge holds , 2 X 1 minute    2. Lower trunk rotations , X 2 minutes     3. Kneeling Louis stretch , Not today    4. Thoracic rotations on swiss ball , 2 X 10 bilaterally     5. Thoracic extension with foam roll , 2 X 10 , Foam rolling up against wall     6. Clam shell holds , 3 X 1 minute holds bilaterally     7. Rows , 3 x 10, 20# cable machine     9. ELDOA, 3 X 1 minute holds       Therapeutic Exercise Summary: 3/29: HEP bridge holds (45seconds), Kneeling Louis stretch (1 minute) and LTR X 2 minutes       Time-based treatments/modalities:    Physical Therapy Timed Treatment Charges  Therapeutic exercise minutes (CPT 42851): 43 " "minutes          ASSESSMENT:   Response to treatment: Continued with strengthening and mobility of her lumbar and thoracic spine. She is now able to hold a bridge position for one minute while maintaining good form throughout. She continues to have decreased right thoracic rotation when compared to the left. She was able to hold the ELDOA position for one minute holds with good form and reports of \"feeling it\" where her thoracic symptoms are. Began rows with 15# due to low RPE rating weight was progressed to 20# where she was able to maintain good form and complete her repetitions. She will continue to benefit from skilled physical therapy for improvements in pain, strength, endurance and stability of her mid/low back.     PLAN/RECOMMENDATIONS:   Plan for treatment: therapy treatment to continue next visit.  Planned interventions for next visit: continue with current treatment.      [x] As the licensed therapist supervising this student, I was present during the entire treatment session directing the care and reviewing the assessment plan.  I reviewed all documentation prior to signing. The following changes or alternations were made by the licensed therapist.     "

## 2022-04-01 ENCOUNTER — HOSPITAL ENCOUNTER (OUTPATIENT)
Dept: RADIOLOGY | Facility: MEDICAL CENTER | Age: 62
End: 2022-04-01
Payer: COMMERCIAL

## 2022-04-01 DIAGNOSIS — N12 PYELONEPHRITIS: ICD-10-CM

## 2022-04-01 DIAGNOSIS — R19.5 CLAY-COLORED STOOLS: ICD-10-CM

## 2022-04-01 DIAGNOSIS — R10.11 RIGHT UPPER QUADRANT PAIN: ICD-10-CM

## 2022-04-01 PROCEDURE — 76705 ECHO EXAM OF ABDOMEN: CPT

## 2022-04-01 PROCEDURE — 74177 CT ABD & PELVIS W/CONTRAST: CPT

## 2022-04-01 PROCEDURE — 700117 HCHG RX CONTRAST REV CODE 255

## 2022-04-01 RX ORDER — AMOXICILLIN AND CLAVULANATE POTASSIUM 875; 125 MG/1; MG/1
1 TABLET, FILM COATED ORAL 2 TIMES DAILY
Qty: 28 TABLET | Refills: 0 | Status: SHIPPED | OUTPATIENT
Start: 2022-04-01 | End: 2022-04-15

## 2022-04-01 RX ADMIN — IOHEXOL 80 ML: 350 INJECTION, SOLUTION INTRAVENOUS at 11:33

## 2022-04-04 NOTE — OP THERAPY DAILY TREATMENT
Outpatient Physical Therapy  DAILY TREATMENT     [facility] Physical [address]  [address]  [place] [address]  Phone:  [phone]  Fax:  [phone]    Date: 04/05/2022    Patient: Danii Rodriguez  YOB: 1960  MRN: [id number]     Time Calculation    Start time: 0945  Stop time: 1027 Time Calculation (min): 42 minutes         Chief Complaint: Back Problem and Weakness    Visit #: 4    SUBJECTIVE:  CT found a kidney infection on Friday and she has been taking antibiotics since. She was able to sleep 7 hours last night without her back pain waking her up or interfering with falling to sleep. Stretches have been helping a lot.     OBJECTIVE:  Current objective measures:   Sustained bridge hold: 55 seconds- reports of increase in symptoms   Louis test: positive bilaterally   Lumbar ROM: Decreased lumbar flexion with pain with cues for segmental flexion   Lumbar joint mobility: 2/6 non-painful  Thoracic joint mobility 2/6 non-painful   Thoracic spine rotation in quadruped: limited to the right by 10 degrees   Hip FABIR/FADER: negative bilaterally   Left hamstring supine length test limited   SLR/Slump: Negative bilaterally   Kidney thumb test: bilaterally negative           Therapeutic Exercises (CPT 64182):     1. Bridge holds , 1 X 1 minute    2. Lower trunk rotations , Not this time    3. Kneeling Louis stretch , Not today    4. Thoracic rotations on swiss ball , Not today     5. Thoracic extension with foam roll , Not today , Foam rolling up against wall     6. Clam shell holds , not this time     7. Rows , Not today     8. Single leg bridge , 2 X 15 bilaterally    9. ELDOA, Not this time     10. Side lank holds , 3 X 1 minute holds bilaterally     11. Dead lift with blue crate , 1 X 10 with 15lbs     12. Hip hinge, 2X 10 , PVC tube     13. Goblet squat 10lbs DB , 2 X 10       Therapeutic Exercise Summary: 3/29: HEP bridge holds (45seconds), Kneeling Louis stretch (1  "minute) and LTR X 2 minutes     4/5: HEP: Single leg bridges, PVC hip hinge, side planks, Louis stretch, goblet squats and lower trunk rotations      Time-based treatments/modalities:    Physical Therapy Timed Treatment Charges  Therapeutic exercise minutes (CPT 02225): 42 minutes          ASSESSMENT:   Response to treatment: Patient continued with strengthening exercises and was able to progress difficulties of clam shell holds to side planks and bridge holds to single leg bridges. Progressed to working on lifting techniques and good back position while bending over. Introduced the hip hinge with a PVC pipe and was able to progress to a 15lbs crate dead lift. While performing dead lift she required min cueing for hinging at her hips and keeping her back straight \"like a pole was running through her spine and it could not bend). PVC hip hinge was given as HEP and she was instructed not to dead lift at home yet for exercise. She will continue to benefit from skilled physical therapy for improvements in strength, endurance, stability, and decreasing pain.     PLAN/RECOMMENDATIONS:   Plan for treatment: therapy treatment to continue next visit.  Planned interventions for next visit: continue with current treatment.      [x] As the licensed therapist supervising this student, I was present during the entire treatment session directing the care and reviewing the assessment plan.  I reviewed all documentation prior to signing. The following changes or alternations were made by the licensed therapist.     "

## 2022-04-05 ENCOUNTER — PHYSICAL THERAPY (OUTPATIENT)
Dept: PHYSICAL THERAPY | Facility: REHABILITATION | Age: 62
End: 2022-04-05
Payer: COMMERCIAL

## 2022-04-05 DIAGNOSIS — M54.50 CHRONIC MIDLINE LOW BACK PAIN WITHOUT SCIATICA: ICD-10-CM

## 2022-04-05 DIAGNOSIS — G89.29 CHRONIC MIDLINE THORACIC BACK PAIN: ICD-10-CM

## 2022-04-05 DIAGNOSIS — M54.6 CHRONIC MIDLINE THORACIC BACK PAIN: ICD-10-CM

## 2022-04-05 DIAGNOSIS — G89.29 CHRONIC MIDLINE LOW BACK PAIN WITHOUT SCIATICA: ICD-10-CM

## 2022-04-05 PROCEDURE — 97110 THERAPEUTIC EXERCISES: CPT

## 2022-04-15 RX ORDER — LISINOPRIL 20 MG/1
TABLET ORAL
Qty: 90 TABLET | Refills: 4 | Status: SHIPPED | OUTPATIENT
Start: 2022-04-15 | End: 2023-07-24

## 2022-04-28 RX ORDER — ESTRADIOL 0.03 MG/D
FILM, EXTENDED RELEASE TRANSDERMAL
Qty: 8 PATCH | Refills: 1 | Status: SHIPPED | OUTPATIENT
Start: 2022-04-28 | End: 2022-07-18

## 2022-05-05 ENCOUNTER — APPOINTMENT (OUTPATIENT)
Dept: PHYSICAL THERAPY | Facility: REHABILITATION | Age: 62
End: 2022-05-05
Payer: COMMERCIAL

## 2022-05-09 ENCOUNTER — TELEPHONE (OUTPATIENT)
Dept: PHYSICAL THERAPY | Facility: REHABILITATION | Age: 62
End: 2022-05-09
Payer: COMMERCIAL

## 2022-05-09 NOTE — OP THERAPY DISCHARGE SUMMARY
Outpatient Physical Therapy  DISCHARGE SUMMARY NOTE      [facility] Therapy [address].  [address]  [place] [address]  Phone:  [phone]  Fax:  [phone]    Date of Visit: 05/09/2022    Patient: Danii Rodriguez  YOB: 1960  MRN: [id number]   Referring Provider: PAN Abdul  [address] [place] [address]   Referring Diagnosis Low back pain, unspecified [M54.50];Other chronic pain [G89.29]            Functional Assessment Used        Your patient is being discharged from Physical Therapy with the following comments:   · Patient has failed to schedule or reschedule follow-up visits    Comments:  Pt last seen on 4/5/2022. No appointments scheduled. Appropriate to AMY Curtis, PT, DPT    Date: 5/9/2022

## 2022-05-24 ENCOUNTER — HOSPITAL ENCOUNTER (OUTPATIENT)
Dept: LAB | Facility: MEDICAL CENTER | Age: 62
End: 2022-05-24
Payer: COMMERCIAL

## 2022-05-24 DIAGNOSIS — E78.2 MIXED HYPERLIPIDEMIA: ICD-10-CM

## 2022-05-24 LAB
CHOLEST SERPL-MCNC: 256 MG/DL (ref 100–199)
FASTING STATUS PATIENT QL REPORTED: NORMAL
HDLC SERPL-MCNC: 76 MG/DL
LDLC SERPL CALC-MCNC: 161 MG/DL
TRIGL SERPL-MCNC: 97 MG/DL (ref 0–149)

## 2022-05-24 PROCEDURE — 36415 COLL VENOUS BLD VENIPUNCTURE: CPT

## 2022-05-24 PROCEDURE — 80061 LIPID PANEL: CPT

## 2022-06-10 ENCOUNTER — APPOINTMENT (OUTPATIENT)
Dept: MEDICAL GROUP | Facility: PHYSICIAN GROUP | Age: 62
End: 2022-06-10
Payer: COMMERCIAL

## 2022-06-13 ENCOUNTER — OFFICE VISIT (OUTPATIENT)
Dept: MEDICAL GROUP | Facility: PHYSICIAN GROUP | Age: 62
End: 2022-06-13
Payer: COMMERCIAL

## 2022-06-13 VITALS
WEIGHT: 135.2 LBS | BODY MASS INDEX: 23.96 KG/M2 | TEMPERATURE: 98.1 F | HEIGHT: 63 IN | SYSTOLIC BLOOD PRESSURE: 90 MMHG | DIASTOLIC BLOOD PRESSURE: 58 MMHG | OXYGEN SATURATION: 100 % | HEART RATE: 88 BPM | RESPIRATION RATE: 16 BRPM

## 2022-06-13 DIAGNOSIS — R03.1 LOW BLOOD PRESSURE, NOT HYPOTENSION: ICD-10-CM

## 2022-06-13 DIAGNOSIS — R42 DIZZINESS: ICD-10-CM

## 2022-06-13 DIAGNOSIS — H53.131 ACUTE LOSS OF VISION, RIGHT: ICD-10-CM

## 2022-06-13 DIAGNOSIS — R20.0 LEFT ARM NUMBNESS: ICD-10-CM

## 2022-06-13 DIAGNOSIS — R00.2 PALPITATIONS: Primary | ICD-10-CM

## 2022-06-13 DIAGNOSIS — E78.49 OTHER HYPERLIPIDEMIA: ICD-10-CM

## 2022-06-13 PROCEDURE — 93000 ELECTROCARDIOGRAM COMPLETE: CPT | Performed by: NURSE PRACTITIONER

## 2022-06-13 PROCEDURE — 99214 OFFICE O/P EST MOD 30 MIN: CPT | Performed by: NURSE PRACTITIONER

## 2022-06-13 RX ORDER — EZETIMIBE 10 MG/1
10 TABLET ORAL
Qty: 90 TABLET | Refills: 4 | Status: SHIPPED | OUTPATIENT
Start: 2022-06-13

## 2022-06-13 ASSESSMENT — FIBROSIS 4 INDEX: FIB4 SCORE: 0.95

## 2022-06-15 PROBLEM — R03.1 LOW BLOOD PRESSURE, NOT HYPOTENSION: Chronic | Status: ACTIVE | Noted: 2022-06-15

## 2022-06-15 PROBLEM — R42 DIZZINESS: Status: ACTIVE | Noted: 2022-06-15

## 2022-06-15 PROBLEM — R20.0 LEFT ARM NUMBNESS: Status: ACTIVE | Noted: 2022-06-15

## 2022-06-15 PROBLEM — R20.0 LEFT ARM NUMBNESS: Chronic | Status: ACTIVE | Noted: 2022-06-15

## 2022-06-15 PROBLEM — R03.1 LOW BLOOD PRESSURE, NOT HYPOTENSION: Status: ACTIVE | Noted: 2022-06-15

## 2022-06-15 PROBLEM — R42 DIZZINESS: Chronic | Status: ACTIVE | Noted: 2022-06-15

## 2022-06-15 PROBLEM — R00.2 PALPITATIONS: Chronic | Status: ACTIVE | Noted: 2021-04-14

## 2022-06-15 PROBLEM — H53.131 ACUTE LOSS OF VISION, RIGHT: Chronic | Status: ACTIVE | Noted: 2022-06-13

## 2022-06-15 NOTE — ASSESSMENT & PLAN NOTE
"Acute condition, unstable.  Patient states that she had an acute episode this past Saturday that was concerned to her and she still does not feel back to normal today.  She states that she turned her head to the right and talk to her daughter, and she had sudden onset of left arm numbness that went to her fingertips, loss of vision in her right eye to complete blackness, and felt woozy.  The episode did not last long, just a few minutes.  She states her vision has returned in her right eye.  She did take her BP during the episode and it was 130/53, which she reports is high for her as she is normally 120/70s.  She is 90/58 in the office today.  She did not present to the emergency department for evaluation when she had this episode.  She states that since that time, she has continued to feel \"off\" in the mornings and is unbalanced and woozy.  She states that she always has heart palpitations, worse when laying down at night, but now these have increased to occurring in the daytime as well. She denies chest pain or shortness of breath during these episodes.   EKG in the office today is normal sinus rhythm without ectopy, which is reassuring.  Discussed with patient that her symptoms are concerning, and she does need further workup.  Labs were ordered including troponin, TSH, CRP, CBC, CMP and D-dimer today.  MRI brain and Ziopatch was also ordered today as well.  I will call patient with results when they are available.  Patient is aware that if she experiences any chest pain, SOB, syncopal episode, vision changes, etc., she should present directly to the ER for further evaluation.    "

## 2022-06-15 NOTE — PROGRESS NOTES
Subjective:     CC: The primary encounter diagnosis was Palpitations. Diagnoses of Dizziness, Left arm numbness, Acute loss of vision, right, and Low blood pressure, not hypotension were also pertinent to this visit.      HPI:   Danii is an established patient of ROOSEVELT Woo, here for an acute visit.  We discussed the following problems:    1. Palpitations  2. Dizziness  3. Left arm numbness  4. Acute loss of vision, right  5. Low blood pressure, not hypotension  Acute condition, unstable.  Patient presents with the above acute symptoms which occurred on 2022 and is here for evaluation today.       Past Medical History:   Diagnosis Date   • Allergic rhinitis    • Anesthesia     ponv   • Arthritis 2020    osteo- hands, knees, hips   • Cough    • Daytime sleepiness    • Eye drainage    • Fatigue    • Heartburn     very occ. diet related   • High cholesterol    • Hoarseness, persistent    • Hypertension    • MRSA (methicillin resistant staph aureus) culture positive     from spider bite- no open wounds as of 2018   • Painful joint    • Palpitations    • Ringing in ears    • Wears glasses        Social History     Tobacco Use   • Smoking status: Former Smoker     Packs/day: 0.00     Quit date: 1976     Years since quittin.4   • Smokeless tobacco: Never Used   Vaping Use   • Vaping Use: Never used   Substance Use Topics   • Alcohol use: Not Currently     Alcohol/week: 3.6 oz     Types: 6 Glasses of wine per week     Comment: Occasionally    • Drug use: No       Current Outpatient Medications Ordered in Epic   Medication Sig Dispense Refill   • Estradiol 0.025 MG/24HR PATCH BIWEEKLY PLACE 1 PATCH ON THE SKIN EVERY 3 DAYS FOR 30 DAYS. 8 Patch 1   • lisinopril (PRINIVIL) 20 MG Tab TAKE 1 TABLET BY MOUTH EVERY DAY 90 Tablet 4   • estrogens, conjugated (PREMARIN) 0.625 MG/GM Cream 0.5g inserted vaginally every 3 days 30 g 1   • albuterol 108 (90 Base) MCG/ACT Aero Soln inhalation aerosol  "albuterol sulfate HFA 90 mcg/actuation aerosol inhaler     • Magnesium 250 MG Tab Take 250 mg by mouth at bedtime.     • ibuprofen (MOTRIN) 200 MG Tab Take 600 mg by mouth every day.     • fluticasone (FLONASE) 50 MCG/ACT nasal spray Spray 2 Sprays in nose every morning as needed (ALLERGY).     • NON SPECIFIED Inject 1 Each as directed Q30 DAYS. SEASONAL SHOT     • Plant Sterols and Stanols (CHOLESTOFF PO) Take 1 Tab by mouth 2 Times a Day.     • coenzyme Q-10 30 MG capsule Take 30 mg by mouth every morning.     • Multiple Vitamins-Minerals (WOMENS 50+ MULTI VITAMIN/MIN PO) Take 1 Tab by mouth every morning.     • Cholecalciferol (VITAMIN D3) 2000 UNIT Cap Take 4,000 Units by mouth every day.     • ezetimibe (ZETIA) 10 MG Tab TAKE 1 TABLET BY MOUTH EVERY DAY 90 Tablet 4     No current Epic-ordered facility-administered medications on file.       Allergies:  Ciprofloxacin, Hydrocodone, Sulfa drugs, Latex, Other environmental, Hydrocodone-acetaminophen, and Statins [hmg-coa-r inhibitors]    Health Maintenance: Deferred    ROS:  Gen: no fevers/chills, no changes in weight  Eyes: +right eye loss of vision, temporary  ENT: no sore throat, no hearing loss, no bloody nose  Pulm: no sob, no cough  CV: no chest pain, +palpitations  GI: no nausea/vomiting, no diarrhea  : no dysuria  MSk: no myalgias  Skin: no rash  Neuro: no headaches, +numbness/tingling to left arm and fingers  Heme/Lymph: no easy bruising      Objective:       Exam:  BP (!) 90/58 (BP Location: Left arm, Patient Position: Sitting, BP Cuff Size: Adult)   Pulse 88   Temp 36.7 °C (98.1 °F) (Temporal)   Resp 16   Ht 1.6 m (5' 3\")   Wt 61.3 kg (135 lb 3.2 oz)   SpO2 100%   BMI 23.95 kg/m²  Body mass index is 23.95 kg/m².    Gen: Alert and oriented, No apparent distress.  Neck: Neck is supple without lymphadenopathy.  No carotid bruits.  Lungs: Normal effort, CTA bilaterally, no wheezes, rhonchi, or rales  CV: Regular rate and rhythm. No murmurs, rubs, or " "gallops.  Ext: No clubbing, cyanosis, edema.    Labs: Dated:  None    EKG Interpretation   Ordered and interpreted by ROOSEVELT Bueno  Rhythm: normal sinus   Rate: 61 normal   Axis: normal   Ectopy: none   Conduction: normal   ST Segments: no acute change   T Waves: no acute change   Q Waves: none   Clinical Impression: no acute changes and normal EKG      Assessment & Plan:     61 y.o. female with the following -     Acute loss of vision, right  Loss of vision in the right eye - transient     Palpitations  Acute condition, unstable.  Patient states that she had an acute episode this past Saturday that was concerned to her and she still does not feel back to normal today.  She states that she turned her head to the right and talk to her daughter, and she had sudden onset of left arm numbness that went to her fingertips, loss of vision in her right eye to complete blackness, and felt woozy.  The episode did not last long, just a few minutes.  She states her vision has returned in her right eye.  She did take her BP during the episode and it was 130/53, which she reports is high for her as she is normally 120/70s.  She is 90/58 in the office today.  She did not present to the emergency department for evaluation when she had this episode.  She states that since that time, she has continued to feel \"off\" in the mornings and is unbalanced and woozy.  She states that she always has heart palpitations, worse when laying down at night, but now these have increased to occurring in the daytime as well. She denies chest pain or shortness of breath during these episodes.   EKG in the office today is normal sinus rhythm without ectopy, which is reassuring.  Discussed with patient that her symptoms are concerning, and she does need further workup.  Labs were ordered including troponin, TSH, CRP, CBC, CMP and D-dimer today.  MRI brain and Ziopatch was also ordered today as well.  I will call patient with results when they are " available.  Patient is aware that if she experiences any chest pain, SOB, syncopal episode, vision changes, etc., she should present directly to the ER for further evaluation.        Orders Placed This Encounter   • MR-BRAIN-W/O   • TSH WITH REFLEX TO FT4   • VITAMIN B12   • D-DIMER   • TROPONIN   • CBC WITH DIFFERENTIAL   • Comp Metabolic Panel   • VITAMIN B1   • URINALYSIS,CULTURE IF INDICATED   • EKG - Clinic Performed   • HOLTER - Cardiology Performed (48HR)          Return in about 4 weeks (around 7/11/2022).    I have placed the below orders and discussed them with an approved delegating provider.  The MA is performing the below orders under the direction of Brook Mac MD.    Please note that this dictation was created using voice recognition software. I have made every reasonable attempt to correct obvious errors, but I expect that there are errors of grammar and possibly content that I did not discover before finalizing the note.

## 2022-06-16 ENCOUNTER — HOSPITAL ENCOUNTER (OUTPATIENT)
Dept: LAB | Facility: MEDICAL CENTER | Age: 62
End: 2022-06-16
Attending: NURSE PRACTITIONER
Payer: COMMERCIAL

## 2022-06-16 DIAGNOSIS — R42 DIZZINESS: ICD-10-CM

## 2022-06-16 DIAGNOSIS — R20.0 LEFT ARM NUMBNESS: ICD-10-CM

## 2022-06-16 LAB
ALBUMIN SERPL BCP-MCNC: 4.8 G/DL (ref 3.2–4.9)
ALBUMIN/GLOB SERPL: 2.1 G/DL
ALP SERPL-CCNC: 59 U/L (ref 30–99)
ALT SERPL-CCNC: 18 U/L (ref 2–50)
ANION GAP SERPL CALC-SCNC: 11 MMOL/L (ref 7–16)
APPEARANCE UR: ABNORMAL
AST SERPL-CCNC: 16 U/L (ref 12–45)
BACTERIA #/AREA URNS HPF: ABNORMAL /HPF
BASOPHILS # BLD AUTO: 0.4 % (ref 0–1.8)
BASOPHILS # BLD: 0.02 K/UL (ref 0–0.12)
BILIRUB SERPL-MCNC: 0.5 MG/DL (ref 0.1–1.5)
BILIRUB UR QL STRIP.AUTO: NEGATIVE
BUN SERPL-MCNC: 12 MG/DL (ref 8–22)
CALCIUM SERPL-MCNC: 9.6 MG/DL (ref 8.5–10.5)
CHLORIDE SERPL-SCNC: 103 MMOL/L (ref 96–112)
CO2 SERPL-SCNC: 24 MMOL/L (ref 20–33)
COLOR UR: YELLOW
CREAT SERPL-MCNC: 0.8 MG/DL (ref 0.5–1.4)
D DIMER PPP IA.FEU-MCNC: 0.4 UG/ML (FEU) (ref 0–0.5)
EOSINOPHIL # BLD AUTO: 0.06 K/UL (ref 0–0.51)
EOSINOPHIL NFR BLD: 1.3 % (ref 0–6.9)
EPI CELLS #/AREA URNS HPF: ABNORMAL /HPF
ERYTHROCYTE [DISTWIDTH] IN BLOOD BY AUTOMATED COUNT: 43.4 FL (ref 35.9–50)
FASTING STATUS PATIENT QL REPORTED: NORMAL
GFR SERPLBLD CREATININE-BSD FMLA CKD-EPI: 82 ML/MIN/1.73 M 2
GLOBULIN SER CALC-MCNC: 2.3 G/DL (ref 1.9–3.5)
GLUCOSE SERPL-MCNC: 91 MG/DL (ref 65–99)
GLUCOSE UR STRIP.AUTO-MCNC: NEGATIVE MG/DL
HCT VFR BLD AUTO: 43.1 % (ref 37–47)
HGB BLD-MCNC: 14 G/DL (ref 12–16)
HYALINE CASTS #/AREA URNS LPF: ABNORMAL /LPF
IMM GRANULOCYTES # BLD AUTO: 0 K/UL (ref 0–0.11)
IMM GRANULOCYTES NFR BLD AUTO: 0 % (ref 0–0.9)
KETONES UR STRIP.AUTO-MCNC: NEGATIVE MG/DL
LEUKOCYTE ESTERASE UR QL STRIP.AUTO: NEGATIVE
LYMPHOCYTES # BLD AUTO: 1.78 K/UL (ref 1–4.8)
LYMPHOCYTES NFR BLD: 39.2 % (ref 22–41)
MCH RBC QN AUTO: 29.9 PG (ref 27–33)
MCHC RBC AUTO-ENTMCNC: 32.5 G/DL (ref 33.6–35)
MCV RBC AUTO: 92.1 FL (ref 81.4–97.8)
MICRO URNS: ABNORMAL
MONOCYTES # BLD AUTO: 0.29 K/UL (ref 0–0.85)
MONOCYTES NFR BLD AUTO: 6.4 % (ref 0–13.4)
NEUTROPHILS # BLD AUTO: 2.39 K/UL (ref 2–7.15)
NEUTROPHILS NFR BLD: 52.7 % (ref 44–72)
NITRITE UR QL STRIP.AUTO: NEGATIVE
NRBC # BLD AUTO: 0 K/UL
NRBC BLD-RTO: 0 /100 WBC
PH UR STRIP.AUTO: 5.5 [PH] (ref 5–8)
PLATELET # BLD AUTO: 278 K/UL (ref 164–446)
PMV BLD AUTO: 12.7 FL (ref 9–12.9)
POTASSIUM SERPL-SCNC: 5.1 MMOL/L (ref 3.6–5.5)
PROT SERPL-MCNC: 7.1 G/DL (ref 6–8.2)
PROT UR QL STRIP: NEGATIVE MG/DL
RBC # BLD AUTO: 4.68 M/UL (ref 4.2–5.4)
RBC # URNS HPF: ABNORMAL /HPF
RBC UR QL AUTO: NEGATIVE
SODIUM SERPL-SCNC: 138 MMOL/L (ref 135–145)
SP GR UR STRIP.AUTO: 1.02
TROPONIN T SERPL-MCNC: <6 NG/L (ref 6–19)
TSH SERPL DL<=0.005 MIU/L-ACNC: 1.42 UIU/ML (ref 0.38–5.33)
UROBILINOGEN UR STRIP.AUTO-MCNC: 0.2 MG/DL
VIT B12 SERPL-MCNC: 662 PG/ML (ref 211–911)
WBC # BLD AUTO: 4.5 K/UL (ref 4.8–10.8)
WBC #/AREA URNS HPF: ABNORMAL /HPF

## 2022-06-16 PROCEDURE — 85025 COMPLETE CBC W/AUTO DIFF WBC: CPT

## 2022-06-16 PROCEDURE — 36415 COLL VENOUS BLD VENIPUNCTURE: CPT

## 2022-06-16 PROCEDURE — 84484 ASSAY OF TROPONIN QUANT: CPT

## 2022-06-16 PROCEDURE — 85379 FIBRIN DEGRADATION QUANT: CPT

## 2022-06-16 PROCEDURE — 80053 COMPREHEN METABOLIC PANEL: CPT

## 2022-06-16 PROCEDURE — 84443 ASSAY THYROID STIM HORMONE: CPT

## 2022-06-16 PROCEDURE — 81001 URINALYSIS AUTO W/SCOPE: CPT

## 2022-06-16 PROCEDURE — 82607 VITAMIN B-12: CPT

## 2022-06-16 PROCEDURE — 84425 ASSAY OF VITAMIN B-1: CPT

## 2022-06-20 ENCOUNTER — NON-PROVIDER VISIT (OUTPATIENT)
Dept: CARDIOLOGY | Facility: MEDICAL CENTER | Age: 62
End: 2022-06-20
Attending: NURSE PRACTITIONER
Payer: COMMERCIAL

## 2022-06-20 DIAGNOSIS — I47.10 SVT (SUPRAVENTRICULAR TACHYCARDIA) (HCC): ICD-10-CM

## 2022-06-20 DIAGNOSIS — I49.3 VENTRICULAR ECTOPY: ICD-10-CM

## 2022-06-20 DIAGNOSIS — R00.2 PALPITATIONS: ICD-10-CM

## 2022-06-20 DIAGNOSIS — I49.1 ATRIAL ECTOPY: ICD-10-CM

## 2022-06-20 DIAGNOSIS — R42 DIZZINESS: ICD-10-CM

## 2022-06-20 NOTE — PROGRESS NOTES
Home enrollment completed for the 14 day [employer] XT Holter monitoring program per ROOSEVELT Bueno.  >Monitor to be mailed to patient by [employer].  >Currently pending EOS.  7/7/22 [employer] is processing data.  .

## 2022-06-21 LAB — VIT B1 BLD-MCNC: 89 NMOL/L (ref 70–180)

## 2022-07-08 ENCOUNTER — TELEPHONE (OUTPATIENT)
Dept: CARDIOLOGY | Facility: MEDICAL CENTER | Age: 62
End: 2022-07-08
Payer: COMMERCIAL

## 2022-07-13 ENCOUNTER — HOSPITAL ENCOUNTER (OUTPATIENT)
Dept: RADIOLOGY | Facility: MEDICAL CENTER | Age: 62
End: 2022-07-13
Attending: NURSE PRACTITIONER
Payer: COMMERCIAL

## 2022-07-13 DIAGNOSIS — R20.0 LEFT ARM NUMBNESS: ICD-10-CM

## 2022-07-13 DIAGNOSIS — R42 DIZZINESS: ICD-10-CM

## 2022-07-13 PROCEDURE — 70551 MRI BRAIN STEM W/O DYE: CPT

## 2022-07-13 PROCEDURE — 93248 EXT ECG>7D<15D REV&INTERPJ: CPT | Performed by: INTERNAL MEDICINE

## 2022-07-14 DIAGNOSIS — R42 DIZZINESS: ICD-10-CM

## 2022-07-18 RX ORDER — ESTRADIOL 0.03 MG/D
FILM, EXTENDED RELEASE TRANSDERMAL
Qty: 8 PATCH | Refills: 1 | Status: SHIPPED | OUTPATIENT
Start: 2022-07-18 | End: 2022-08-16

## 2022-07-27 ENCOUNTER — OFFICE VISIT (OUTPATIENT)
Dept: MEDICAL GROUP | Facility: PHYSICIAN GROUP | Age: 62
End: 2022-07-27
Payer: COMMERCIAL

## 2022-07-27 VITALS
HEART RATE: 70 BPM | OXYGEN SATURATION: 98 % | TEMPERATURE: 99.1 F | HEIGHT: 63 IN | BODY MASS INDEX: 23.57 KG/M2 | SYSTOLIC BLOOD PRESSURE: 116 MMHG | DIASTOLIC BLOOD PRESSURE: 62 MMHG | RESPIRATION RATE: 16 BRPM | WEIGHT: 133 LBS

## 2022-07-27 DIAGNOSIS — H92.02 OTALGIA, LEFT: ICD-10-CM

## 2022-07-27 DIAGNOSIS — R10.32 LLQ PAIN: ICD-10-CM

## 2022-07-27 DIAGNOSIS — R10.31 RLQ ABDOMINAL PAIN: ICD-10-CM

## 2022-07-27 PROCEDURE — 99204 OFFICE O/P NEW MOD 45 MIN: CPT | Performed by: INTERNAL MEDICINE

## 2022-07-27 ASSESSMENT — FIBROSIS 4 INDEX: FIB4 SCORE: 0.85

## 2022-07-27 NOTE — PROGRESS NOTES
PRIMARY CARE CLINIC VISIT  Chief Complaint   Patient presents with   • Follow-Up     Right lower quadrant pain  Left lower quadrant pain    Left ear pain    CANDY Abdul.=pcp    History of Present Illness     RLQ abdominal pain  This is a new condition.  Patient reported initial pain in the right lower quadrant.  In the last couple of days she also noted intermittent pain in the left lower quadrant.  Patient denies fever or chills.    The patient denies GI bleeding.  Patient reported intermittent watery stools.  No dysuria or gross hematuria noted.    Patient is status post hysterectomy and bilateral oophorectomy 2002.    Patient also reported intermittent nausea but no vomiting  Pain is rated 5/10 intensity.    Otalgia, left  This is also a new condition noted since last several days.  The patient denies fever or chills.  Patient status post left ear tube placement approximately 3 years ago.  She denies significant change in her hearing.  She denies tinnitus.      Current Outpatient Medications on File Prior to Visit   Medication Sig Dispense Refill   • Estradiol 0.025 MG/24HR PATCH BIWEEKLY PLACE 1 PATCH ON THE SKIN EVERY 3 DAYS FOR 30 DAYS. 8 Patch 1   • ezetimibe (ZETIA) 10 MG Tab TAKE 1 TABLET BY MOUTH EVERY DAY 90 Tablet 4   • lisinopril (PRINIVIL) 20 MG Tab TAKE 1 TABLET BY MOUTH EVERY DAY 90 Tablet 4   • estrogens, conjugated (PREMARIN) 0.625 MG/GM Cream 0.5g inserted vaginally every 3 days 30 g 1   • albuterol 108 (90 Base) MCG/ACT Aero Soln inhalation aerosol albuterol sulfate HFA 90 mcg/actuation aerosol inhaler     • Magnesium 250 MG Tab Take 250 mg by mouth at bedtime.     • ibuprofen (MOTRIN) 200 MG Tab Take 600 mg by mouth every day.     • fluticasone (FLONASE) 50 MCG/ACT nasal spray Spray 2 Sprays in nose every morning as needed (ALLERGY).     • NON SPECIFIED Inject 1 Each as directed Q30 DAYS. SEASONAL SHOT     • Plant Sterols and Stanols (CHOLESTOFF PO) Take 1 Tab by mouth 2 Times a  "Day.     • coenzyme Q-10 30 MG capsule Take 30 mg by mouth every morning.     • Multiple Vitamins-Minerals (WOMENS 50+ MULTI VITAMIN/MIN PO) Take 1 Tab by mouth every morning.     • Cholecalciferol (VITAMIN D3) 2000 UNIT Cap Take 4,000 Units by mouth every day.       No current facility-administered medications on file prior to visit.        Allergies: Ciprofloxacin, Hydrocodone, Sulfa drugs, Latex, Other environmental, Hydrocodone-acetaminophen, and Statins [hmg-coa-r inhibitors]    ROS  As per HPI above. All other systems reviewed and negative.      Past Medical, Social, and Family history reviewed and updated in EPIC     Objective     /62 (BP Location: Right arm, Patient Position: Sitting, BP Cuff Size: Adult)   Pulse 70   Temp 37.3 °C (99.1 °F) (Temporal)   Resp 16   Ht 1.6 m (5' 3\")   Wt 60.3 kg (133 lb)   SpO2 98%    Body mass index is 23.56 kg/m².    General: alert in no apparent distress.  Cardiovascular: regular rate and rhythm  Pulmonary: lungs : no wheezing   Gastrointestinal: BS present. No obvious mass noted  Mild discomfort noted with deep palpation in the right lower quadrant however there is no rebound guarding or rigidity noted.  Left lower quadrant examination no significant pain noted with palpation.    Left ear external canal is clear the tympanic membrane's noted with the blue ear tube in place.  No significant redness or discharge noted.  Assessment and Plan     1. RLQ abdominal pain  2. LLQ pain    This is a 61-year-old patient present today with new onset of right lower quadrant and left lower quadrant pain noted since last several days.  Examination today is unrevealing.  Rule out possible appendicitis versus colitis/diverticulitis.  Rule out infectious versus mechanical obstruction such as bowel obstruction versus neoplastic causes versus others  Urgent CT scan of the abdomen and pelvis ordered.  Due to national shortage of IV contrast I will order the CT without " contrast.    Recommend follow-up after CT and lab test completed.  - CT-ABDOMEN-PELVIS W/O; Future  - CBC WITHOUT DIFFERENTIAL; Future  - Basic Metabolic Panel; Future  - URINALYSIS; Future  - URINE CULTURE(NEW); Future  - AMYLASE; Future      By the patient to go to ER if symptoms worsen or any change in her condition.    3. Otalgia, left  Status post left ear tube placement 3 years ago  Cause unclear  - Referral to ENT      Advised the patient to follow-up with her PCP for other chronic medical conditions          Please note that this dictation was created using voice recognition software. I have made every reasonable attempt to correct obvious errors, but I expect that there are errors of grammar and possibly content that I did not discover before finalizing the note.    Baldemar Duke MD  Internal Medicine  [place] [facility]

## 2022-07-27 NOTE — ASSESSMENT & PLAN NOTE
This is also a new condition noted since last several days.  The patient denies fever or chills.  Patient status post left ear tube placement approximately 3 years ago.  She denies significant change in her hearing.  She denies tinnitus.

## 2022-07-27 NOTE — ASSESSMENT & PLAN NOTE
This is a new condition.  Patient reported initial pain in the right lower quadrant.  In the last couple of days she also noted intermittent pain in the left lower quadrant.  Patient denies fever or chills.    The patient denies GI bleeding.  Patient reported intermittent watery stools.  No dysuria or gross hematuria noted.    Patient is status post hysterectomy and bilateral oophorectomy 2002.    Patient also reported intermittent nausea but no vomiting  Pain is rated 5/10 intensity.

## 2022-07-28 ENCOUNTER — HOSPITAL ENCOUNTER (OUTPATIENT)
Dept: LAB | Facility: MEDICAL CENTER | Age: 62
End: 2022-07-28
Attending: INTERNAL MEDICINE
Payer: COMMERCIAL

## 2022-07-28 ENCOUNTER — HOSPITAL ENCOUNTER (OUTPATIENT)
Dept: RADIOLOGY | Facility: MEDICAL CENTER | Age: 62
End: 2022-07-28
Attending: INTERNAL MEDICINE
Payer: COMMERCIAL

## 2022-07-28 DIAGNOSIS — R10.32 LLQ PAIN: ICD-10-CM

## 2022-07-28 DIAGNOSIS — R10.31 RLQ ABDOMINAL PAIN: ICD-10-CM

## 2022-07-28 LAB
AMYLASE SERPL-CCNC: 46 U/L (ref 20–103)
ANION GAP SERPL CALC-SCNC: 11 MMOL/L (ref 7–16)
APPEARANCE UR: CLEAR
BILIRUB UR QL STRIP.AUTO: NEGATIVE
BUN SERPL-MCNC: 15 MG/DL (ref 8–22)
CALCIUM SERPL-MCNC: 9.2 MG/DL (ref 8.5–10.5)
CHLORIDE SERPL-SCNC: 104 MMOL/L (ref 96–112)
CO2 SERPL-SCNC: 23 MMOL/L (ref 20–33)
COLOR UR: YELLOW
CREAT SERPL-MCNC: 0.7 MG/DL (ref 0.5–1.4)
ERYTHROCYTE [DISTWIDTH] IN BLOOD BY AUTOMATED COUNT: 43.8 FL (ref 35.9–50)
GFR SERPLBLD CREATININE-BSD FMLA CKD-EPI: 97 ML/MIN/1.73 M 2
GLUCOSE SERPL-MCNC: 80 MG/DL (ref 65–99)
GLUCOSE UR STRIP.AUTO-MCNC: NEGATIVE MG/DL
HCT VFR BLD AUTO: 40.9 % (ref 37–47)
HGB BLD-MCNC: 13.6 G/DL (ref 12–16)
KETONES UR STRIP.AUTO-MCNC: NEGATIVE MG/DL
LEUKOCYTE ESTERASE UR QL STRIP.AUTO: NEGATIVE
MCH RBC QN AUTO: 30.4 PG (ref 27–33)
MCHC RBC AUTO-ENTMCNC: 33.3 G/DL (ref 33.6–35)
MCV RBC AUTO: 91.5 FL (ref 81.4–97.8)
MICRO URNS: NORMAL
NITRITE UR QL STRIP.AUTO: NEGATIVE
PH UR STRIP.AUTO: 6.5 [PH] (ref 5–8)
PLATELET # BLD AUTO: 286 K/UL (ref 164–446)
PMV BLD AUTO: 12 FL (ref 9–12.9)
POTASSIUM SERPL-SCNC: 5.1 MMOL/L (ref 3.6–5.5)
PROT UR QL STRIP: NEGATIVE MG/DL
RBC # BLD AUTO: 4.47 M/UL (ref 4.2–5.4)
RBC UR QL AUTO: NEGATIVE
SODIUM SERPL-SCNC: 138 MMOL/L (ref 135–145)
SP GR UR STRIP.AUTO: 1.02
UROBILINOGEN UR STRIP.AUTO-MCNC: 0.2 MG/DL
WBC # BLD AUTO: 5.1 K/UL (ref 4.8–10.8)

## 2022-07-28 PROCEDURE — 74176 CT ABD & PELVIS W/O CONTRAST: CPT

## 2022-07-28 PROCEDURE — 82150 ASSAY OF AMYLASE: CPT

## 2022-07-28 PROCEDURE — 80048 BASIC METABOLIC PNL TOTAL CA: CPT

## 2022-07-28 PROCEDURE — 81003 URINALYSIS AUTO W/O SCOPE: CPT

## 2022-07-28 PROCEDURE — 36415 COLL VENOUS BLD VENIPUNCTURE: CPT

## 2022-07-28 PROCEDURE — 87086 URINE CULTURE/COLONY COUNT: CPT

## 2022-07-28 PROCEDURE — 85027 COMPLETE CBC AUTOMATED: CPT

## 2022-07-30 LAB
BACTERIA UR CULT: NORMAL
SIGNIFICANT IND 70042: NORMAL
SITE SITE: NORMAL
SOURCE SOURCE: NORMAL

## 2022-08-16 RX ORDER — ESTRADIOL 0.03 MG/D
FILM, EXTENDED RELEASE TRANSDERMAL
Qty: 24 PATCH | Refills: 1 | Status: SHIPPED | OUTPATIENT
Start: 2022-08-16 | End: 2022-10-31 | Stop reason: SDUPTHER

## 2022-09-15 ENCOUNTER — HOSPITAL ENCOUNTER (OUTPATIENT)
Facility: MEDICAL CENTER | Age: 62
End: 2022-09-15
Attending: PHYSICIAN ASSISTANT
Payer: COMMERCIAL

## 2022-09-15 ENCOUNTER — OFFICE VISIT (OUTPATIENT)
Dept: URGENT CARE | Facility: PHYSICIAN GROUP | Age: 62
End: 2022-09-15
Payer: COMMERCIAL

## 2022-09-15 VITALS
TEMPERATURE: 98.5 F | HEIGHT: 63 IN | BODY MASS INDEX: 23.21 KG/M2 | RESPIRATION RATE: 20 BRPM | DIASTOLIC BLOOD PRESSURE: 62 MMHG | OXYGEN SATURATION: 97 % | WEIGHT: 131 LBS | HEART RATE: 82 BPM | SYSTOLIC BLOOD PRESSURE: 118 MMHG

## 2022-09-15 DIAGNOSIS — R30.0 DYSURIA: ICD-10-CM

## 2022-09-15 DIAGNOSIS — R39.9 LOWER URINARY TRACT SYMPTOMS (LUTS): ICD-10-CM

## 2022-09-15 LAB
APPEARANCE UR: CLEAR
BILIRUB UR STRIP-MCNC: NEGATIVE MG/DL
COLOR UR AUTO: YELLOW
GLUCOSE UR STRIP.AUTO-MCNC: NEGATIVE MG/DL
KETONES UR STRIP.AUTO-MCNC: NEGATIVE MG/DL
LEUKOCYTE ESTERASE UR QL STRIP.AUTO: NORMAL
NITRITE UR QL STRIP.AUTO: NEGATIVE
PH UR STRIP.AUTO: 5.5 [PH] (ref 5–8)
PROT UR QL STRIP: NEGATIVE MG/DL
RBC UR QL AUTO: NEGATIVE
SP GR UR STRIP.AUTO: 1.02
UROBILINOGEN UR STRIP-MCNC: 0.2 MG/DL

## 2022-09-15 PROCEDURE — 99214 OFFICE O/P EST MOD 30 MIN: CPT | Performed by: PHYSICIAN ASSISTANT

## 2022-09-15 PROCEDURE — 87077 CULTURE AEROBIC IDENTIFY: CPT

## 2022-09-15 PROCEDURE — 81002 URINALYSIS NONAUTO W/O SCOPE: CPT | Performed by: PHYSICIAN ASSISTANT

## 2022-09-15 PROCEDURE — 87186 SC STD MICRODIL/AGAR DIL: CPT

## 2022-09-15 PROCEDURE — 87086 URINE CULTURE/COLONY COUNT: CPT

## 2022-09-15 RX ORDER — PHENAZOPYRIDINE HYDROCHLORIDE 200 MG/1
TABLET, FILM COATED ORAL
Qty: 15 TABLET | Refills: 0 | Status: SHIPPED | OUTPATIENT
Start: 2022-09-15

## 2022-09-15 ASSESSMENT — ENCOUNTER SYMPTOMS
CONSTIPATION: 0
FEVER: 0
NAUSEA: 0
HEADACHES: 0
SORE THROAT: 0
CHILLS: 0
COUGH: 0
DIARRHEA: 0
SHORTNESS OF BREATH: 0
ABDOMINAL PAIN: 0
EYE PAIN: 0
MYALGIAS: 0
VOMITING: 0

## 2022-09-15 ASSESSMENT — FIBROSIS 4 INDEX: FIB4 SCORE: 0.83

## 2022-09-15 NOTE — PROGRESS NOTES
"Subjective:   Danii Rodriguez is a 61 y.o. female who presents for UTI (Pt states that she is prone to \"these\", urgency, low back pain, lower abd pain- 3x day )    This is a pleasant 61-year-old female presenting to urgent care noticing that she has had several months to years of difficulty voiding, incomplete voiding and bladder spasms.  Oftentimes is associated with some low back pain that is occasionally right-sided, occasionally left-sided.  Previous medical history is notable for previous cystoscopy in the early 2000's revealing a profuse amount of bladder polyps that were removed surgically.  Apparently she had a cystoscopy around 5 years ago but is unsure the results of that.  Further medical history is confused by the fact that she had pyelonephritis earlier this year and was unsure the results of the CT scan that revealed some fat stranding of the kidneys as well as phleboliths.  She denies any fevers or chills.  She is not noticed any dysuria.  The symptoms seem to come and go but it been more prominent the last 3 days. She has had kidney stones in the past and this does not feel like kidney stones.    Review of Systems   Constitutional:  Negative for chills and fever.   HENT:  Negative for congestion, ear pain and sore throat.    Eyes:  Negative for pain.   Respiratory:  Negative for cough and shortness of breath.    Cardiovascular:  Negative for chest pain.   Gastrointestinal:  Negative for abdominal pain, constipation, diarrhea, nausea and vomiting.   Genitourinary:  Positive for frequency and urgency. Negative for dysuria.   Musculoskeletal:  Negative for myalgias.   Skin:  Negative for rash.   Neurological:  Negative for headaches.     Medications, Allergies, and current problem list reviewed today in Epic.     Objective:     /62 (BP Location: Right arm, Patient Position: Sitting, BP Cuff Size: Adult)   Pulse 82   Temp 36.9 °C (98.5 °F) (Temporal)   Resp 20   Ht 1.6 m (5' 3\")   Wt 59.4 " kg (131 lb)   SpO2 97%     Physical Exam  Vitals reviewed.   Constitutional:       Appearance: Normal appearance.   HENT:      Head: Normocephalic and atraumatic.      Right Ear: External ear normal.      Left Ear: External ear normal.      Nose: Nose normal.      Mouth/Throat:      Mouth: Mucous membranes are moist.   Eyes:      Conjunctiva/sclera: Conjunctivae normal.   Cardiovascular:      Rate and Rhythm: Normal rate and regular rhythm.   Pulmonary:      Effort: Pulmonary effort is normal.      Breath sounds: Normal breath sounds.   Abdominal:      Tenderness: There is no right CVA tenderness or left CVA tenderness.   Musculoskeletal:         General: No tenderness.   Skin:     General: Skin is warm and dry.      Capillary Refill: Capillary refill takes less than 2 seconds.   Neurological:      Mental Status: She is alert and oriented to person, place, and time.       Lab Results/POC Test Results   Results for orders placed or performed in visit on 09/15/22   POCT Urinalysis   Result Value Ref Range    POC Color Yellow Negative    POC Appearance Clear Negative    POC Leukocyte Esterase Small Negative    POC Nitrites Negative Negative    POC Urobiligen 0.2 Negative (0.2) mg/dL    POC Protein Negative Negative mg/dL    POC Urine PH 5.5 5.0 - 8.0    POC Blood negative Negative    POC Specific Gravity 1.025 <1.005 - >1.030    POC Ketones Negative Negative mg/dL    POC Bilirubin Negative Negative mg/dL    POC Glucose Negative Negative mg/dL           Assessment/Plan:     Diagnosis and associated orders:     1. Dysuria  POCT Urinalysis    URINE CULTURE(NEW)    phenazopyridine (PYRIDIUM) 200 MG Tab      2. Lower urinary tract symptoms (LUTS)  Referral to Urology         Comments/MDM:     Patient symptoms are most likely anatomic or related to polyps.  We reviewed all of her previous imaging that I had visible and she had some confusion regarding previous CT scans.  Unfortunate unable to see the previous cystoscopies  however in her case I not believe that this is an infectious process however I will rule this out with a urine culture.  I think this is more of a urogenital issue and that her urologist will be the specialist most capable of evaluating this.  She is lost touch with her urologist I did place a referral but I encouraged her to call her previous urologist to see if they can get her in any sooner.  We discussed ER precautions.  I provide Azo for symptomatic relief which may not be helpful depending on the etiology.         Differential diagnosis, natural history, supportive care, and indications for immediate follow-up discussed.    Advised the patient to follow-up with the primary care physician for recheck, reevaluation, and consideration of further management.    Please note that this dictation was created using voice recognition software. I have made a reasonable attempt to correct obvious errors, but I expect that there are errors of grammar and possibly content that I did not discover before finalizing the note.    This note was electronically signed by Garfield Dickinson PA-C

## 2022-09-16 DIAGNOSIS — R30.0 DYSURIA: ICD-10-CM

## 2022-09-18 ENCOUNTER — TELEPHONE (OUTPATIENT)
Dept: URGENT CARE | Facility: CLINIC | Age: 62
End: 2022-09-18
Payer: COMMERCIAL

## 2022-09-18 DIAGNOSIS — N30.90 CYSTITIS WITHOUT HEMATURIA: ICD-10-CM

## 2022-09-18 LAB
BACTERIA UR CULT: ABNORMAL
BACTERIA UR CULT: ABNORMAL
SIGNIFICANT IND 70042: ABNORMAL
SITE SITE: ABNORMAL
SOURCE SOURCE: ABNORMAL

## 2022-09-18 RX ORDER — CEFDINIR 300 MG/1
300 CAPSULE ORAL 2 TIMES DAILY
Qty: 10 CAPSULE | Refills: 0 | Status: SHIPPED | OUTPATIENT
Start: 2022-09-18 | End: 2022-09-23

## 2022-09-18 NOTE — TELEPHONE ENCOUNTER
Urine culture evidences bacterial infection localizing to the urinary tract.  Left voicemail for patient instructing her to initiate this medication and making her aware that I sent it to the [facility] pharmacy on [address] [facility].  I reviewed her previous allergies as well as the culture and sensitivity data and I think that cefdinir for 5 days will be adequate and highly effective.    Garfield Dickinson P.A.-C.

## 2022-10-31 ENCOUNTER — PATIENT MESSAGE (OUTPATIENT)
Dept: MEDICAL GROUP | Facility: PHYSICIAN GROUP | Age: 62
End: 2022-10-31
Payer: COMMERCIAL

## 2022-11-02 RX ORDER — ESTRADIOL 0.03 MG/D
FILM, EXTENDED RELEASE TRANSDERMAL
Qty: 24 PATCH | Refills: 1 | Status: SHIPPED | OUTPATIENT
Start: 2022-11-02 | End: 2022-11-30

## 2022-11-30 RX ORDER — ESTRADIOL 0.03 MG/D
FILM, EXTENDED RELEASE TRANSDERMAL
Qty: 24 PATCH | Refills: 2 | Status: SHIPPED | OUTPATIENT
Start: 2022-11-30

## 2023-01-04 ENCOUNTER — TELEPHONE (OUTPATIENT)
Dept: HEALTH INFORMATION MANAGEMENT | Facility: OTHER | Age: 63
End: 2023-01-04
Payer: COMMERCIAL

## 2023-01-04 DIAGNOSIS — J30.2 SEASONAL ALLERGIES: ICD-10-CM

## 2023-01-04 NOTE — TELEPHONE ENCOUNTER
1. Caller Name: Danii Rodriguez                        Call Back Number: [phone]      How would the patient prefer to be contacted with a response: [employer] message    2. SPECIFIC Action To Be Taken: Referral pending, please sign.    3. Diagnosis/Clinical Reason for Request: Allergist    4. Specialty & Provider Name/Lab/Imaging Location: [facility]    5. Is appointment scheduled for requested order/referral: yes - 01/17/23    Patient was informed they will receive a return phone call from the office ONLY if there are any questions before processing their request. Advised to call back if they haven't received a call from the referral department in 5 days.    Patient note: My Allergist  Dr. Berry Has retired and I am setting up an appointment with [facility], Dr Rose.  I have new INS  with [employer], my info  is on my My Chart page.  I need a Referral to  start my new patent status.  I made the appointment for Jan 17th, if you could send the Referral  so I can keep this appointment I would be most grateful.

## 2023-01-04 NOTE — LETTER
[facility]  PAN Abdul  [address]  [place] [address]  Fax: [phone]   Authorization for Release/Disclosure of   Protected Health Information   Name: DANII VALENTINE : 1960 SSN: [id number]   Address: [address] [place] [address]   [facility] [address] Phone:    [phone] (home)    I authorize the entity listed below to release/disclose the PHI below to:   [facility] and [employer] [employer]   Provider or Entity Name:     Address   City, State, Zip   Phone:      Fax:     Reason for request: continuity of care   Information to be released:    [  ] LAST COLONOSCOPY,  including any PATH REPORT and follow-up  [  ] LAST FIT/COLOGUARD RESULT [  ] LAST DEXA  [  ] LAST MAMMOGRAM  [  ] LAST PAP  [  ] LAST LABS [  ] RETINA EXAM REPORT  [  ] IMMUNIZATION RECORDS  [  ] Release all info      [  ] Check here and initial the line next to each item to release ALL health information INCLUDING  _____ Care and treatment for drug and / or alcohol abuse  _____ HIV testing, infection status, or AIDS  _____ Genetic Testing    DATES OF SERVICE OR TIME PERIOD TO BE DISCLOSED: _____________  I understand and acknowledge that:  * This Authorization may be revoked at any time by you in writing, except if your health information has already been used or disclosed.  * Your health information that will be used or disclosed as a result of you signing this authorization could be re-disclosed by the recipient. If this occurs, your re-disclosed health information may no longer be protected by State or Federal laws.  * You may refuse to sign this Authorization. Your refusal will not affect your ability to obtain treatment.  * This Authorization becomes effective upon signing and will  on (date) __________.      If no date is indicated, this Authorization will  one (1) year from the signature date.    Name: Danii Valentine    Signature:   Date:     2023        PLEASE FAX REQUESTED RECORDS BACK TO: [phone]

## 2023-02-06 ENCOUNTER — PATIENT MESSAGE (OUTPATIENT)
Dept: MEDICAL GROUP | Facility: PHYSICIAN GROUP | Age: 63
End: 2023-02-06
Payer: COMMERCIAL

## 2023-02-06 DIAGNOSIS — J30.2 SEASONAL ALLERGIES: ICD-10-CM

## 2023-02-06 NOTE — PATIENT COMMUNICATION
Pt called asking for you to send a new referral to [facility] with her new insurance. I told her to call scheduling to get her insurance updated.

## 2023-02-07 ENCOUNTER — APPOINTMENT (OUTPATIENT)
Dept: MEDICAL GROUP | Facility: PHYSICIAN GROUP | Age: 63
End: 2023-02-07
Payer: COMMERCIAL

## 2023-02-07 ENCOUNTER — TELEPHONE (OUTPATIENT)
Dept: MEDICAL GROUP | Facility: PHYSICIAN GROUP | Age: 63
End: 2023-02-07

## 2023-02-07 DIAGNOSIS — J30.2 SEASONAL ALLERGIES: ICD-10-CM

## 2023-02-07 DIAGNOSIS — H93.8X2 PRESSURE SENSATION IN LEFT EAR: ICD-10-CM

## 2023-02-07 NOTE — TELEPHONE ENCOUNTER
Called [facility] to explain that old referral was not process with her new insurance and that we can't backdate the referral.Talked with Luis about it and she has submitted an appeal with insurance for pt. Luis asked that we put in another referral for future visit for pt.     Called pt and explained that we have talked to [facility] and they will submit an appeal with her insurance. Also let pt know that we will submit a referral in for future visit.     Emailed Ignacia Erazo (Referral Specialist)

## 2023-03-01 ENCOUNTER — TELEPHONE (OUTPATIENT)
Dept: MEDICAL GROUP | Facility: PHYSICIAN GROUP | Age: 63
End: 2023-03-01
Payer: COMMERCIAL

## 2023-03-01 NOTE — TELEPHONE ENCOUNTER
Called the referral department and spoke with Ignacia that originally did the authorization. Ignacia stated that [employer] only allow the referral to do 1 visit at a time. Bubba ALBERT would need to put in new referral for each visit.     Pt can complain with her insurance but [employer] require a referral for each visits

## 2023-03-02 DIAGNOSIS — J30.2 SEASONAL ALLERGIES: ICD-10-CM

## 2023-03-06 ENCOUNTER — PATIENT MESSAGE (OUTPATIENT)
Dept: MEDICAL GROUP | Facility: PHYSICIAN GROUP | Age: 63
End: 2023-03-06
Payer: COMMERCIAL

## 2023-03-06 DIAGNOSIS — J30.2 SEASONAL ALLERGIES: ICD-10-CM

## 2023-03-22 ENCOUNTER — TELEPHONE (OUTPATIENT)
Dept: MEDICAL GROUP | Facility: PHYSICIAN GROUP | Age: 63
End: 2023-03-22
Payer: COMMERCIAL

## 2023-03-22 DIAGNOSIS — J30.2 SEASONAL ALLERGIES: ICD-10-CM

## 2023-03-22 NOTE — TELEPHONE ENCOUNTER
Khalida called requesting a new referral be sent with cpt code 80692 so patient can get her allergy shots with one referral rather than a new referral every 6 visits

## 2023-04-18 ENCOUNTER — OFFICE VISIT (OUTPATIENT)
Dept: MEDICAL GROUP | Facility: PHYSICIAN GROUP | Age: 63
End: 2023-04-18
Payer: COMMERCIAL

## 2023-04-18 VITALS
WEIGHT: 136.4 LBS | HEIGHT: 63 IN | BODY MASS INDEX: 24.17 KG/M2 | TEMPERATURE: 98.2 F | HEART RATE: 66 BPM | OXYGEN SATURATION: 97 % | DIASTOLIC BLOOD PRESSURE: 66 MMHG | SYSTOLIC BLOOD PRESSURE: 124 MMHG | RESPIRATION RATE: 16 BRPM

## 2023-04-18 DIAGNOSIS — H92.02 OTALGIA, LEFT: ICD-10-CM

## 2023-04-18 PROCEDURE — 99214 OFFICE O/P EST MOD 30 MIN: CPT

## 2023-04-18 RX ORDER — AMOXICILLIN 500 MG/1
500 CAPSULE ORAL 3 TIMES DAILY
Qty: 21 CAPSULE | Refills: 0 | Status: SHIPPED | OUTPATIENT
Start: 2023-04-18 | End: 2023-04-25

## 2023-04-18 RX ORDER — CHLORHEXIDINE GLUCONATE ORAL RINSE 1.2 MG/ML
SOLUTION DENTAL
COMMUNITY
Start: 2023-01-30

## 2023-04-18 RX ORDER — AZELASTINE 1 MG/ML
SPRAY, METERED NASAL
COMMUNITY
Start: 2023-03-14

## 2023-04-18 RX ORDER — METHYLPREDNISOLONE 4 MG/1
TABLET ORAL
COMMUNITY
Start: 2023-01-30 | End: 2023-04-18

## 2023-04-18 RX ORDER — PENICILLIN V POTASSIUM 500 MG/1
500 TABLET ORAL 4 TIMES DAILY
COMMUNITY
Start: 2023-01-30 | End: 2023-04-18

## 2023-04-18 ASSESSMENT — FIBROSIS 4 INDEX: FIB4 SCORE: 0.84

## 2023-04-18 ASSESSMENT — PATIENT HEALTH QUESTIONNAIRE - PHQ9: CLINICAL INTERPRETATION OF PHQ2 SCORE: 0

## 2023-04-18 NOTE — PROGRESS NOTES
"CC:   Chief Complaint   Patient presents with    Ear Pain     Ear pressure and dizziness     Ear Drainage     Blood and fluid        HISTORY OF PRESENT ILLNESS: Patient is a 62 y.o. female established patient who presents today to discuss the following problems below:     Otalgia, left  Patient presents for evaluation of consistent left ear pain.  She had a tube placed approximately 3 years ago that has not fallen out. She reports she has a history of recurrent mastoiditis. She does get intermittent pains in her ear with allergy season. She reports that she is having more blood coming out of her ear accompanied by clear fluid. She has some sinus pressure and pain along her eustachian tube. She gets some dizziness. She gets the most drainage at night when she is laying down. She was established with Dr. Kahn previously.       Review of Systems: Otherwise negative except for as stated above.      Exam: /66 (BP Location: Right arm, Patient Position: Sitting, BP Cuff Size: Adult)   Pulse 66   Temp 36.8 °C (98.2 °F) (Temporal)   Resp 16   Ht 1.6 m (5' 3\")   Wt 61.9 kg (136 lb 6.4 oz)   SpO2 97%  Body mass index is 24.16 kg/m².    Physical Exam  Constitutional:       Appearance: Normal appearance.   HENT:      Right Ear: Tympanic membrane is bulging.      Left Ear: Drainage present. A PE tube is present.      Ears:      Comments: Bloody and clear fluid, excoriation to external ear canal   Pulmonary:      Effort: Pulmonary effort is normal.   Musculoskeletal:      Cervical back: Normal range of motion and neck supple.   Lymphadenopathy:      Cervical: No cervical adenopathy.   Neurological:      General: No focal deficit present.      Mental Status: She is alert and oriented to person, place, and time.   Psychiatric:         Mood and Affect: Mood normal.         Behavior: Behavior normal.       Assessment/Plan:  62 y.o. female with the following -    1. Otalgia, left  Acute on chronic problem, complicated " secondary to history of L ear tube placement 3 years ago which appears to be located behind the TM with some bulging. Bleeding and drainage appear to be from external canal. Treat for potential infection, obtain imaging if no improvement and refer back to ENT for further eval   - MR-BRAIN IAC'S W/WO; Future  - amoxicillin (AMOXIL) 500 MG Cap; Take 1 Capsule by mouth 3 times a day for 7 days.  Dispense: 21 Capsule; Refill: 0  - Referral to ENT      Follow-up: Return if symptoms worsen or fail to improve.    Health Maintenance: Completed      Please note that this dictation was created using voice recognition software. I have made every reasonable attempt to correct obvious errors, but I expect that there are errors of grammar and possibly content that I did not discover before finalizing the note.    Electronically signed by FÁTIMA Woo on April 18, 2023

## 2023-04-18 NOTE — ASSESSMENT & PLAN NOTE
Patient presents for evaluation of consistent left ear pain.  She had a tube placed approximately 3 years ago that has not fallen out. She reports she has a history of recurrent mastoiditis. She does get intermittent pains in her ear with allergy season. She reports that she is having more blood coming out of her ear accompanied by clear fluid. She has some sinus pressure and pain along her eustachian tube. She gets some dizziness. She gets the most drainage at night when she is laying down. She was established with Dr. Kahn previously.

## 2023-07-06 DIAGNOSIS — J30.2 SEASONAL ALLERGIES: ICD-10-CM

## 2023-07-24 RX ORDER — LISINOPRIL 20 MG/1
TABLET ORAL
Qty: 90 TABLET | Refills: 4 | Status: SHIPPED | OUTPATIENT
Start: 2023-07-24

## 2023-07-25 DIAGNOSIS — J30.2 SEASONAL ALLERGIES: ICD-10-CM

## 2024-03-25 PROBLEM — M79.644 THUMB PAIN, RIGHT: Status: ACTIVE | Noted: 2024-03-25

## 2024-03-25 PROBLEM — M79.641 RIGHT HAND PAIN: Status: ACTIVE | Noted: 2024-03-25

## 2024-03-25 PROBLEM — M18.11 PRIMARY OSTEOARTHRITIS OF FIRST CARPOMETACARPAL JOINT OF RIGHT HAND: Status: ACTIVE | Noted: 2024-03-25

## (undated) DEVICE — SUTURE 4-0 ETHILON PS-2 18 (12PK/BX)"

## (undated) DEVICE — CHLORAPREP 26 ML APPLICATOR - ORANGE TINT(25/CA)

## (undated) DEVICE — SUCTION INSTRUMENT YANKAUER BULBOUS TIP W/O VENT (50EA/CA)

## (undated) DEVICE — BANDAID SHEER STRIP 3/4 IN (100EA/BX 12BX/CA)

## (undated) DEVICE — GLOVE BIOGEL SZ 8 SURGICAL PF LTX - (50PR/BX 4BX/CA)

## (undated) DEVICE — GLOVE, LITE (PAIR)

## (undated) DEVICE — HUMID-VENT HEAT AND MOISTURE EXCHANGE- (50/BX)

## (undated) DEVICE — CANISTER SUCTION RIGID RED 1500CC (40EA/CA)

## (undated) DEVICE — SENSOR SPO2 NEO LNCS ADHESIVE (20/BX) SEE USER NOTES

## (undated) DEVICE — SYRINGE SAFETY 5 ML 18 GA X 1-1/2 BLUNT LL (100/BX 4BX/CA)

## (undated) DEVICE — SODIUM CHL IRRIGATION 0.9% 1000ML (12EA/CA)

## (undated) DEVICE — ELECTRODE DUAL RETURN W/ CORD - (50/PK)

## (undated) DEVICE — KIT ANESTHESIA W/CIRCUIT & 3/LT BAG W/FILTER (20EA/CA)

## (undated) DEVICE — LACTATED RINGERS INJ 1000 ML - (14EA/CA 60CA/PF)

## (undated) DEVICE — WATER IRRIGATION STERILE 1000ML (12EA/CA)

## (undated) DEVICE — GOWN WARMING STANDARD FLEX - (30/CA)

## (undated) DEVICE — STOCKINET BIAS 4 IN STERILE - (20/CA)

## (undated) DEVICE — GLOVE BIOGEL INDICATOR SZ 8 SURGICAL PF LTX - (50/BX 4BX/CA)

## (undated) DEVICE — D-5-W, INJ., 50 ML (2B0086)

## (undated) DEVICE — PROTECTOR ULNA NERVE - (36PR/CA)

## (undated) DEVICE — HEAD HOLDER JUNIOR/ADULT

## (undated) DEVICE — TUBE CONNECTING SUCTION - CLEAR PLASTIC STERILE 72 IN (50EA/CA)

## (undated) DEVICE — SUTURE 4-0 ETHILON FS-2 18 (36PK/BX)"

## (undated) DEVICE — ELECTRODE 850 FOAM ADHESIVE - HYDROGEL RADIOTRNSPRNT (50/PK)

## (undated) DEVICE — MASK ANESTHESIA ADULT  - (100/CA)

## (undated) DEVICE — NEPTUNE 4 PORT MANIFOLD - (20/PK)

## (undated) DEVICE — SPONGE GAUZESTER 4 X 4 4PLY - (128PK/CA)

## (undated) DEVICE — DRESSING XEROFORM 1X8 - (50/BX 4BX/CA)

## (undated) DEVICE — SPLINT PLASTER 4 IN  X 15 IN - (50/BX 12BX/CA)

## (undated) DEVICE — DRAPE C-ARM LARGE 41IN X 74 IN - (10/BX 2BX/CA)

## (undated) DEVICE — NEEDLE NON SAFETY 25 GA X 1 1/2 IN HYPO (100EA/BX)

## (undated) DEVICE — KIT ROOM DECONTAMINATION

## (undated) DEVICE — SUTURE GENERAL

## (undated) DEVICE — PACK UPPER EXTREMITY SM OR - (3/CA)

## (undated) DEVICE — TOURNIQUET CUFF 18 X 3 ONE PORT DISP - STERILE (10/BX)

## (undated) DEVICE — BAG, SPONGE COUNT 50600

## (undated) DEVICE — SUTURE 3-0 VICRYL PLUS SH - 8X 18 INCH (12/BX)

## (undated) DEVICE — SLING ORTH UNV TIETX VLFM ARM

## (undated) DEVICE — BAG SPONGE COUNT 10.25 X 32 - BLUE (250/CA)

## (undated) DEVICE — PADDING CAST 4 IN STERILE - 4 X 4 YDS (24/CA)

## (undated) DEVICE — SUTURE 3-0 VICRYL PLUS RB-1 - 8 X 18 INCH (12/BX)